# Patient Record
Sex: MALE | Race: WHITE | Employment: OTHER | URBAN - NONMETROPOLITAN AREA
[De-identification: names, ages, dates, MRNs, and addresses within clinical notes are randomized per-mention and may not be internally consistent; named-entity substitution may affect disease eponyms.]

---

## 2017-02-08 ENCOUNTER — HOSPITAL ENCOUNTER (OUTPATIENT)
Dept: NUCLEAR MEDICINE | Age: 67
Discharge: HOME | End: 2017-02-08
Payer: OTHER GOVERNMENT

## 2017-02-08 DIAGNOSIS — R06.02 SHORTNESS OF BREATH: ICD-10-CM

## 2017-02-08 PROCEDURE — 78582 LUNG VENTILAT&PERFUS IMAGING: CPT

## 2017-02-08 PROCEDURE — 3430000000 HC RX DIAGNOSTIC RADIOPHARMACEUTICAL: Performed by: INTERNAL MEDICINE

## 2017-02-08 PROCEDURE — A9558 XE133 XENON 10MCI: HCPCS | Performed by: INTERNAL MEDICINE

## 2017-02-08 PROCEDURE — A9540 TC99M MAA: HCPCS | Performed by: INTERNAL MEDICINE

## 2017-02-08 RX ADMIN — Medication 10 MILLICURIE: at 11:52

## 2017-02-08 RX ADMIN — Medication 5 MILLICURIE: at 11:52

## 2017-10-30 ENCOUNTER — HOSPITAL ENCOUNTER (OUTPATIENT)
Dept: WOUND CARE | Age: 67
Discharge: HOME | End: 2017-10-30
Payer: MEDICARE

## 2017-10-30 VITALS
HEIGHT: 71 IN | BODY MASS INDEX: 25.06 KG/M2 | HEART RATE: 77 BPM | TEMPERATURE: 97.5 F | WEIGHT: 179 LBS | RESPIRATION RATE: 18 BRPM | SYSTOLIC BLOOD PRESSURE: 104 MMHG | DIASTOLIC BLOOD PRESSURE: 58 MMHG

## 2017-10-30 DIAGNOSIS — E11.621 TYPE 2 DIABETES MELLITUS WITH FOOT ULCER, WITH LONG-TERM CURRENT USE OF INSULIN (HCC): ICD-10-CM

## 2017-10-30 DIAGNOSIS — L97.313 NON-PRESSURE CHRONIC ULCER OF RIGHT ANKLE WITH NECROSIS OF MUSCLE (HCC): Chronic | ICD-10-CM

## 2017-10-30 DIAGNOSIS — Z79.4 TYPE 2 DIABETES MELLITUS WITH FOOT ULCER, WITH LONG-TERM CURRENT USE OF INSULIN (HCC): ICD-10-CM

## 2017-10-30 DIAGNOSIS — L97.509 TYPE 2 DIABETES MELLITUS WITH FOOT ULCER, WITH LONG-TERM CURRENT USE OF INSULIN (HCC): ICD-10-CM

## 2017-10-30 DIAGNOSIS — I25.10 CORONARY ARTERY DISEASE INVOLVING NATIVE HEART, ANGINA PRESENCE UNSPECIFIED, UNSPECIFIED VESSEL OR LESION TYPE: ICD-10-CM

## 2017-10-30 PROCEDURE — 99213 OFFICE O/P EST LOW 20 MIN: CPT

## 2017-10-30 PROCEDURE — 99203 OFFICE O/P NEW LOW 30 MIN: CPT | Performed by: SURGERY

## 2017-10-30 PROCEDURE — 11042 DBRDMT SUBQ TIS 1ST 20SQCM/<: CPT

## 2017-10-30 PROCEDURE — 11042 DBRDMT SUBQ TIS 1ST 20SQCM/<: CPT | Performed by: SURGERY

## 2017-10-30 RX ORDER — BUMETANIDE 1 MG/1
1 TABLET ORAL 3 TIMES DAILY
Status: ON HOLD | COMMUNITY
End: 2017-12-18 | Stop reason: HOSPADM

## 2017-10-30 RX ORDER — TRAMADOL HYDROCHLORIDE 50 MG/1
50 TABLET ORAL EVERY 6 HOURS PRN
Status: ON HOLD | COMMUNITY
End: 2017-12-18 | Stop reason: HOSPADM

## 2017-10-30 RX ORDER — METOPROLOL SUCCINATE 25 MG/1
25 TABLET, EXTENDED RELEASE ORAL DAILY
Status: ON HOLD | COMMUNITY
End: 2019-04-12 | Stop reason: CLARIF

## 2017-10-30 RX ORDER — SPIRONOLACTONE 50 MG/1
50 TABLET, FILM COATED ORAL DAILY
Status: ON HOLD | COMMUNITY
End: 2017-12-18 | Stop reason: HOSPADM

## 2017-10-30 RX ORDER — WARFARIN SODIUM 6 MG/1
6 TABLET ORAL
Status: ON HOLD | COMMUNITY
End: 2017-12-18 | Stop reason: HOSPADM

## 2017-10-30 RX ORDER — LATANOPROST 50 UG/ML
1 SOLUTION/ DROPS OPHTHALMIC NIGHTLY
COMMUNITY

## 2017-10-30 RX ORDER — WARFARIN SODIUM 1 MG/1
1 TABLET ORAL EVERY OTHER DAY
Status: ON HOLD | COMMUNITY
End: 2017-12-18 | Stop reason: HOSPADM

## 2017-10-30 RX ORDER — ALBUTEROL SULFATE 90 UG/1
2 AEROSOL, METERED RESPIRATORY (INHALATION) EVERY 6 HOURS PRN
Status: ON HOLD | COMMUNITY
End: 2017-12-18 | Stop reason: HOSPADM

## 2017-10-30 RX ORDER — SENNA AND DOCUSATE SODIUM 50; 8.6 MG/1; MG/1
2 TABLET, FILM COATED ORAL 2 TIMES DAILY
Status: ON HOLD | COMMUNITY
End: 2017-12-18 | Stop reason: HOSPADM

## 2017-10-30 RX ORDER — MULTIVIT-MIN/IRON/FOLIC ACID/K 18-600-40
CAPSULE ORAL DAILY
COMMUNITY

## 2017-10-30 RX ORDER — METOLAZONE 2.5 MG/1
2.5 TABLET ORAL DAILY
Status: ON HOLD | COMMUNITY
End: 2017-12-18 | Stop reason: HOSPADM

## 2017-10-30 RX ORDER — LANOLIN ALCOHOL/MO/W.PET/CERES
3 CREAM (GRAM) TOPICAL NIGHTLY
Status: ON HOLD | COMMUNITY
End: 2017-12-18 | Stop reason: HOSPADM

## 2017-10-30 RX ORDER — GUAIFENESIN 600 MG/1
1200 TABLET, EXTENDED RELEASE ORAL 2 TIMES DAILY PRN
Status: ON HOLD | COMMUNITY
End: 2019-04-12 | Stop reason: SINTOL

## 2017-10-30 RX ORDER — ACETAMINOPHEN 500 MG
500 TABLET ORAL EVERY 6 HOURS PRN
Status: ON HOLD | COMMUNITY
End: 2017-12-18 | Stop reason: HOSPADM

## 2017-10-30 RX ORDER — LORATADINE 10 MG/1
10 CAPSULE, LIQUID FILLED ORAL DAILY
Status: ON HOLD | COMMUNITY
End: 2017-12-18 | Stop reason: HOSPADM

## 2017-10-30 RX ORDER — FLUTICASONE PROPIONATE 50 MCG
2 SPRAY, SUSPENSION (ML) NASAL 2 TIMES DAILY
Status: ON HOLD | COMMUNITY
End: 2019-04-12 | Stop reason: ALTCHOICE

## 2017-10-30 RX ORDER — HYDRALAZINE HYDROCHLORIDE 10 MG/1
10 TABLET, FILM COATED ORAL 3 TIMES DAILY
Status: ON HOLD | COMMUNITY
End: 2017-12-18 | Stop reason: HOSPADM

## 2017-10-30 RX ORDER — LACTULOSE 20 G/30ML
SOLUTION ORAL 2 TIMES DAILY
Status: ON HOLD | COMMUNITY
End: 2019-04-12

## 2017-10-30 RX ORDER — FLUOXETINE HYDROCHLORIDE 20 MG/1
20 CAPSULE ORAL NIGHTLY
COMMUNITY

## 2017-10-30 RX ORDER — LEVOTHYROXINE SODIUM 0.07 MG/1
50 TABLET ORAL DAILY
Status: ON HOLD | COMMUNITY
End: 2019-04-12

## 2017-10-30 RX ORDER — TAMSULOSIN HYDROCHLORIDE 0.4 MG/1
0.4 CAPSULE ORAL DAILY
COMMUNITY

## 2017-10-30 RX ORDER — INSULIN GLARGINE 100 [IU]/ML
15 INJECTION, SOLUTION SUBCUTANEOUS 2 TIMES DAILY
COMMUNITY

## 2017-10-30 RX ORDER — GABAPENTIN 300 MG/1
300 CAPSULE ORAL 2 TIMES DAILY
Status: ON HOLD | COMMUNITY
End: 2017-12-18 | Stop reason: HOSPADM

## 2017-10-30 RX ORDER — ATORVASTATIN CALCIUM 40 MG/1
40 TABLET, FILM COATED ORAL NIGHTLY
COMMUNITY

## 2017-10-30 RX ORDER — ALLOPURINOL 100 MG/1
100 TABLET ORAL DAILY
COMMUNITY

## 2017-10-30 RX ORDER — CLOPIDOGREL BISULFATE 75 MG/1
75 TABLET ORAL DAILY
Status: ON HOLD | COMMUNITY
End: 2017-12-18 | Stop reason: HOSPADM

## 2017-10-30 RX ORDER — DIPHENHYDRAMINE HCL 25 MG
25 CAPSULE ORAL NIGHTLY
Status: ON HOLD | COMMUNITY
End: 2017-12-18 | Stop reason: HOSPADM

## 2017-10-30 RX ORDER — TRAZODONE HYDROCHLORIDE 100 MG/1
100 TABLET ORAL NIGHTLY
COMMUNITY

## 2017-10-30 ASSESSMENT — PAIN DESCRIPTION - LOCATION: LOCATION: ANKLE

## 2017-10-30 ASSESSMENT — PAIN DESCRIPTION - PROGRESSION: CLINICAL_PROGRESSION: GRADUALLY WORSENING

## 2017-10-30 ASSESSMENT — PAIN DESCRIPTION - ORIENTATION: ORIENTATION: RIGHT

## 2017-10-30 ASSESSMENT — PAIN DESCRIPTION - ONSET: ONSET: GRADUAL

## 2017-10-30 ASSESSMENT — PAIN DESCRIPTION - DESCRIPTORS: DESCRIPTORS: BURNING

## 2017-10-30 ASSESSMENT — PAIN SCALES - GENERAL: PAINLEVEL_OUTOF10: 3

## 2017-10-30 ASSESSMENT — PAIN DESCRIPTION - PAIN TYPE: TYPE: ACUTE PAIN

## 2017-10-30 ASSESSMENT — PAIN DESCRIPTION - FREQUENCY: FREQUENCY: CONTINUOUS

## 2017-10-30 NOTE — PROGRESS NOTES
nightly      acetaminophen (TYLENOL) 500 MG tablet Take 500 mg by mouth every 6 hours as needed for Pain      Cholecalciferol (VITAMIN D) 2000 units CAPS capsule Take by mouth daily      mupirocin (BACTROBAN) 2 % ointment Apply topically 2 times daily for 20 days 1 Tube 2     Current Facility-Administered Medications   Medication Dose Route Frequency Provider Last Rate Last Dose    lidocaine (XYLOCAINE) 2 % jelly 1 Dose  1 Dose Topical Once Shahab Acosta MD         Allergies: Prazosin  Past Medical History:   Diagnosis Date    Acute kidney failure (HCC)     Benign prostatic hyperplasia without urinary obstruction     CAD (coronary artery disease)     A fib    [facility]     Diabetes mellitus ([facility]     Emphysema lung ([facility] [facility]     Glaucoma     Gout     Hypertension     Muscle weakness     Occlusion and stenosis of left carotid artery     Psychiatric problem     depression       Past Surgical History:   Procedure Laterality Date    AORTIC VALVE REPLACEMENT      [facility]    EF60%    CARDIAC CATHETERIZATION  12/16/14  [facility]    right heart cath EF 60%    CORONARY ANGIOPLASTY WITH STENT PLACEMENT  12/18/14  [facility]    to RCA     History reviewed. No pertinent family history. Social History   Substance Use Topics    Smoking status: Never Smoker    Smokeless tobacco: Never Used    Alcohol use No         Review of Systems    Constitutional - no significant activity change, appetite change, or unexpected weight change. No fever or chills. No diaphoresis or significant fatigue. HENT - no significant rhinorrhea or epistaxis. No tinnitus or significant hearing loss. Eyes - no sudden vision change or amaurosis. Respiratory - no significant shortness of breath, wheezing, or stridor. No apnea, cough, or chest tightness associated with shortness of breath. Cardiovascular - no chest pain, syncope, or significant dizziness.   No Status Old drainage; Intact 10/30/2017  8:54 AM   Wound Cleansed Rinsed/Irrigated with saline 10/30/2017  8:54 AM   Necrotic Type Yellow Fibrin/Slough 10/30/2017  8:54 AM   Necrotic Amount Small: 1-33% 10/30/2017  8:54 AM   Granulation Quality Pink 10/30/2017  8:54 AM   Drainage Amount Large 10/30/2017  8:54 AM   Drainage Description Serosanguinous 10/30/2017  8:54 AM   Odor Mild 10/30/2017  8:54 AM   Epithelialization None present 10/30/2017  8:54 AM   Margins Attached edges 10/30/2017  8:54 AM   Exposed structure Tendon 10/30/2017  8:54 AM   Debridement per physician Subcutaneous 10/30/2017  9:18 AM   Time out Yes 10/30/2017  9:18 AM   Procedural Pain 0 10/30/2017  9:18 AM   Post procedural Pain 0 10/30/2017  9:18 AM   Number of days: 0      The patients pain isPain Level: 3 Pain Type: Acute pain. Please refer to nursing measurements and assessment regarding wound pre and post debridement. Bleeding: Minimal    Hemostasis:   by pressure    Response to treatment:  Well tolerated by patient. Plan for wound - Dress per physician order    Treatment:     Compression : No   Offloading : Yes   Dressing : SEE AVS   Additional Therapy : Pt needs immobilizer boot and promogran as well as ISMAEL and labs   Discussed appropriate home care of this wound. Wound redressed. Patient instructions were given. Follow up: 1 week.   Recommend no smoking  Offloading instructions given    Discharge Instructions       Visit Discharge/Physician Orders    Discharge condition: Stable    Discharge to: Home    Left via:Private automobile    ECF/HHA: [address] [facility]    Dressing Orders: Right Achilles  Promogran to wound, Santyl Ointment, Saline Moist Gauze, Dry Gauze, Roll Gauze, Medipore Tape, Twice Daily  Wear walking Boot to immobilize ankle  Elevate foot to level or above heart 3-4 times daily and as needed to reduce swelling  ISMAEL on 11/[address] in [address] Test will be done in [address] at 8:00  Wound Care

## 2017-11-06 ENCOUNTER — HOSPITAL ENCOUNTER (OUTPATIENT)
Dept: WOUND CARE | Age: 67
Discharge: HOME | End: 2017-11-06
Payer: MEDICARE

## 2017-11-06 ENCOUNTER — HOSPITAL ENCOUNTER (OUTPATIENT)
Dept: NON INVASIVE DIAGNOSTICS | Age: 67
Discharge: HOME | End: 2017-11-06
Payer: MEDICARE

## 2017-11-06 DIAGNOSIS — L97.313 NON-PRESSURE CHRONIC ULCER OF RIGHT ANKLE WITH NECROSIS OF MUSCLE (HCC): Chronic | ICD-10-CM

## 2017-11-06 DIAGNOSIS — L97.313 NON-PRESSURE CHRONIC ULCER OF RIGHT ANKLE WITH NECROSIS OF MUSCLE (HCC): ICD-10-CM

## 2017-11-06 DIAGNOSIS — L97.509 TYPE 2 DIABETES MELLITUS WITH FOOT ULCER, WITH LONG-TERM CURRENT USE OF INSULIN (HCC): ICD-10-CM

## 2017-11-06 DIAGNOSIS — I25.10 CORONARY ARTERY DISEASE INVOLVING NATIVE HEART, ANGINA PRESENCE UNSPECIFIED, UNSPECIFIED VESSEL OR LESION TYPE: ICD-10-CM

## 2017-11-06 DIAGNOSIS — Z79.4 TYPE 2 DIABETES MELLITUS WITH FOOT ULCER, WITH LONG-TERM CURRENT USE OF INSULIN (HCC): ICD-10-CM

## 2017-11-06 DIAGNOSIS — E11.621 TYPE 2 DIABETES MELLITUS WITH FOOT ULCER, WITH LONG-TERM CURRENT USE OF INSULIN (HCC): ICD-10-CM

## 2017-11-06 PROCEDURE — 97597 DBRDMT OPN WND 1ST 20 CM/<: CPT

## 2017-11-06 PROCEDURE — 97597 DBRDMT OPN WND 1ST 20 CM/<: CPT | Performed by: SURGERY

## 2017-11-06 PROCEDURE — 93923 UPR/LXTR ART STDY 3+ LVLS: CPT

## 2017-11-06 NOTE — PROGRESS NOTES
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 Performed by: Maryanna Ahumada, MD    Consent obtained:  Yes    Time out taken:  Yes    Pain Control: Anesthetic  Anesthetic: 2% Xylocaine Gel       Debridement:Non-excisional Debridement    Using curette the wound(s)/ulcer(s) was/were sharply debrided down through and including the removal of viable and non-viable epidermis and dermis. Devitalized Tissue Debrided:  fibrin, biofilm, slough and exudate    Pre Debridement Measurements:  Are located in the [place]  Documentation Flow Sheet    Wound/Ulcer #: 1    Percent of Wound/Ulcer Debrided: 50%    Total Surface Area Debrided:  4.5 sq cm       Diabetic Ulcer 10/30/17 Ankle Right WOUND 1 RIGHT ACHILLES DIABETIC  (Active)   Janel-wound Assessment Pink;Red 11/6/2017  9:35 AM   Janel-Wound Texture Scarring 11/6/2017  9:35 AM   Janel-Wound Moisture Dry; Intact 11/6/2017  9:35 AM   Janel-Wound Color Pink 11/6/2017  9:35 AM   Diabetic Wound - Claudene Skeeters 1 11/6/2017  9:35 AM   Non-staged Wound Description Not applicable 95/3/1998  2:35 AM   Wound Assessment Granulation tissue;Slough; Yellow 11/6/2017  9:35 AM   Shape roundish 10/30/2017  8:54 AM   Wound Length (cm) 2.6 cm 11/6/2017  9:48 AM   Wound Width (cm) 3.8 cm 11/6/2017  9:48 AM   Wound Depth (cm)  .3 11/6/2017  9:48 AM   Calculated Wound Size (cm^2) (l*w) 9.88 cm^2 11/6/2017  9:48 AM   Change in Wound Size % (l*w) -47.02 11/6/2017  9:48 AM   Dressing Status Old drainage; Intact 11/6/2017  9:35 AM   Dressing Changed Changed/New 10/30/2017 10:28 AM   Wound Cleansed Rinsed/Irrigated with saline 11/6/2017  9:35 AM   Necrotic Type Yellow Fibrin/Slough 11/6/2017  9:35 AM   Necrotic Amount Small: 1-33% 11/6/2017  9:35 AM   Granulation Quality Pink 11/6/2017  9:35 AM   Drainage Amount Large 11/6/2017  9:35 AM   Drainage Description Serosanguinous 11/6/2017  9:35 AM   Odor Mild 11/6/2017  9:35 AM   Epithelialization None present 11/6/2017  9:35 AM   Distance Tunneling (cm) 0 cm 11/6/2017  9:35 AM   Tunneling Position ___ O'Clock 0 11/6/2017  9:35 AM   Tunneling Maxium Distance (cm) 0 11/6/2017  9:35 AM   Undermining Starts ___ O'Clock 0 11/6/2017  9:35 AM   Undermining Ends___ O'Clock 0 11/6/2017  9:35 AM   Undermining Maxium Distance (cm) 0 11/6/2017  9:35 AM   Schlusser%Wound Bed 10 11/6/2017  9:35 AM   Red%Wound Bed 0 11/6/2017  9:35 AM   Yellow%Wound Bed 90 11/6/2017  9:35 AM   Black%Wound Bed 0 11/6/2017  9:35 AM   Purple%Wound Bed 0 11/6/2017  9:35 AM   Margins Attached edges 11/6/2017  9:35 AM   Exposed structure Tendon 11/6/2017  9:35 AM   Debridement per physician Full thickness 11/6/2017  9:48 AM   Time out Yes 11/6/2017  9:48 AM   Procedural Pain 0 11/6/2017  9:48 AM   Post procedural Pain 0 11/6/2017  9:48 AM   Number of days: 7       Diabetic/Pressure/Non Pressure Ulcers only:  Ulcer: Diabetic ulcer, fat layer exposed      Estimated Blood Loss:  Minimal    Hemostasis Achieved:  by pressure    Procedural Pain:  0  / 10     Post Procedural Pain:  0 / 10     Response to treatment:  Well tolerated by

## 2017-11-13 ENCOUNTER — HOSPITAL ENCOUNTER (OUTPATIENT)
Dept: WOUND CARE | Age: 67
Discharge: HOME | End: 2017-11-13
Payer: MEDICARE

## 2017-11-13 VITALS
DIASTOLIC BLOOD PRESSURE: 57 MMHG | TEMPERATURE: 97.4 F | HEART RATE: 81 BPM | HEIGHT: 71 IN | BODY MASS INDEX: 25.06 KG/M2 | RESPIRATION RATE: 14 BRPM | SYSTOLIC BLOOD PRESSURE: 100 MMHG | WEIGHT: 179 LBS

## 2017-11-13 DIAGNOSIS — L97.313 NON-PRESSURE CHRONIC ULCER OF RIGHT ANKLE WITH NECROSIS OF MUSCLE (HCC): ICD-10-CM

## 2017-11-13 DIAGNOSIS — L97.313 NON-PRESSURE CHRONIC ULCER OF RIGHT ANKLE WITH NECROSIS OF MUSCLE (HCC): Chronic | ICD-10-CM

## 2017-11-13 DIAGNOSIS — L97.509 TYPE 2 DIABETES MELLITUS WITH FOOT ULCER, WITH LONG-TERM CURRENT USE OF INSULIN (HCC): ICD-10-CM

## 2017-11-13 DIAGNOSIS — Z79.4 TYPE 2 DIABETES MELLITUS WITH FOOT ULCER, WITH LONG-TERM CURRENT USE OF INSULIN (HCC): ICD-10-CM

## 2017-11-13 DIAGNOSIS — E11.621 TYPE 2 DIABETES MELLITUS WITH FOOT ULCER, WITH LONG-TERM CURRENT USE OF INSULIN (HCC): ICD-10-CM

## 2017-11-13 DIAGNOSIS — I25.10 CORONARY ARTERY DISEASE INVOLVING NATIVE HEART, ANGINA PRESENCE UNSPECIFIED, UNSPECIFIED VESSEL OR LESION TYPE: ICD-10-CM

## 2017-11-13 LAB
ALBUMIN SERPL-MCNC: 3.6 G/DL (ref 3.5–5.2)
ALP BLD-CCNC: 114 U/L (ref 40–130)
ALT SERPL-CCNC: 12 U/L (ref 5–41)
ANION GAP SERPL CALCULATED.3IONS-SCNC: 14 MMOL/L (ref 7–19)
AST SERPL-CCNC: 20 U/L (ref 5–40)
BASOPHILS ABSOLUTE: 0.1 K/UL (ref 0–0.2)
BASOPHILS RELATIVE PERCENT: 0.7 % (ref 0–1)
BILIRUB SERPL-MCNC: 0.3 MG/DL (ref 0.2–1.2)
BUN BLDV-MCNC: 44 MG/DL (ref 8–23)
C-REACTIVE PROTEIN: 2.96 MG/DL (ref 0–0.5)
CALCIUM SERPL-MCNC: 9.6 MG/DL (ref 8.8–10.2)
CHLORIDE BLD-SCNC: 94 MMOL/L (ref 98–111)
CO2: 31 MMOL/L (ref 22–29)
CREAT SERPL-MCNC: 1.2 MG/DL (ref 0.5–1.2)
EOSINOPHILS ABSOLUTE: 0.5 K/UL (ref 0–0.6)
EOSINOPHILS RELATIVE PERCENT: 6.4 % (ref 0–5)
GFR NON-AFRICAN AMERICAN: >60
GLUCOSE BLD-MCNC: 184 MG/DL (ref 74–109)
HBA1C MFR BLD: 6.4 %
HCT VFR BLD CALC: 35.8 % (ref 42–52)
HEMOGLOBIN: 10.7 G/DL (ref 14–18)
LYMPHOCYTES ABSOLUTE: 1.1 K/UL (ref 1.1–4.5)
LYMPHOCYTES RELATIVE PERCENT: 14.6 % (ref 20–40)
MCH RBC QN AUTO: 25.1 PG (ref 27–31)
MCHC RBC AUTO-ENTMCNC: 29.9 G/DL (ref 33–37)
MCV RBC AUTO: 84 FL (ref 80–94)
MONOCYTES ABSOLUTE: 0.7 K/UL (ref 0–0.9)
MONOCYTES RELATIVE PERCENT: 8.9 % (ref 0–10)
NEUTROPHILS ABSOLUTE: 5.2 K/UL (ref 1.5–7.5)
NEUTROPHILS RELATIVE PERCENT: 69 % (ref 50–65)
PDW BLD-RTO: 16.4 % (ref 11.5–14.5)
PLATELET # BLD: 218 K/UL (ref 130–400)
PMV BLD AUTO: 9.8 FL (ref 9.4–12.4)
POTASSIUM SERPL-SCNC: 4.6 MMOL/L (ref 3.5–5)
PREALBUMIN: 24 MG/DL (ref 20–40)
RBC # BLD: 4.26 M/UL (ref 4.7–6.1)
SEDIMENTATION RATE, ERYTHROCYTE: 46 MM/HR (ref 0–15)
SODIUM BLD-SCNC: 139 MMOL/L (ref 136–145)
TOTAL PROTEIN: 7.5 G/DL (ref 6.6–8.7)
WBC # BLD: 7.5 K/UL (ref 4.8–10.8)

## 2017-11-13 PROCEDURE — 99213 OFFICE O/P EST LOW 20 MIN: CPT

## 2017-11-13 PROCEDURE — 99213 OFFICE O/P EST LOW 20 MIN: CPT | Performed by: SURGERY

## 2017-11-13 NOTE — PROGRESS NOTES
mouth daily      atorvastatin (LIPITOR) 40 MG tablet Take 40 mg by mouth nightly      bumetanide (BUMEX) 1 MG tablet Take 1 mg by mouth 3 times daily      clopidogrel (PLAVIX) 75 MG tablet Take 75 mg by mouth daily      warfarin (COUMADIN) 1 MG tablet Take 1 mg by mouth every other day      warfarin (COUMADIN) 6 MG tablet Take 6 mg by mouth      diphenhydrAMINE (BENADRYL) 25 MG capsule Take 25 mg by mouth nightly      fluticasone (FLONASE) 50 MCG/ACT nasal spray 2 sprays by Nasal route 2 times daily      FLUoxetine (PROZAC) 20 MG capsule Take 20 mg by mouth nightly      gabapentin (NEURONTIN) 300 MG capsule Take 300 mg by mouth 2 times daily      guaiFENesin (MUCINEX) 600 MG extended release tablet Take 1,200 mg by mouth 2 times daily      hydrALAZINE (APRESOLINE) 10 MG tablet Take 10 mg by mouth 3 times daily      lactulose 20 GM/30ML SOLN Take by mouth 2 times daily      latanoprost (XALATAN) 0.005 % ophthalmic solution Place 1 drop into both eyes nightly      levothyroxine (SYNTHROID) 75 MCG tablet Take 75 mcg by mouth Daily      loratadine (CLARITIN) 10 MG capsule Take 10 mg by mouth daily      melatonin 3 MG TABS tablet Take 3 mg by mouth nightly      metolazone (ZAROXOLYN) 2.5 MG tablet Take 2.5 mg by mouth daily      metoprolol succinate (TOPROL XL) 25 MG extended release tablet Take 25 mg by mouth daily      olodaterol (STRIVERDI RESPIMAT) 2.5 MCG/ACT inhaler Inhale 2 puffs into the lungs daily      sennosides-docusate sodium (SENOKOT-S) 8.6-50 MG tablet Take 2 tablets by mouth 2 times daily      tiotropium (SPIRIVA) 18 MCG inhalation capsule Inhale 18 mcg into the lungs daily      tamsulosin (FLOMAX) 0.4 MG capsule Take 0.4 mg by mouth daily      traMADol (ULTRAM) 50 MG tablet Take 50 mg by mouth every 6 hours as needed for Pain      traZODone (DESYREL) 100 MG tablet Take 100 mg by mouth nightly      acetaminophen (TYLENOL) 500 MG tablet Take 500 mg by mouth every 6 hours as needed for Pain      Cholecalciferol (VITAMIN D) 2000 units CAPS capsule Take by mouth daily      mupirocin (BACTROBAN) 2 % ointment Apply topically 2 times daily for 20 days 1 Tube 2    insulin glargine (LANTUS) 100 UNIT/ML injection vial Inject 42 Units into the skin nightly       No current facility-administered medications on file prior to encounter. ALLERGIES    Prazosin        Objective:         Vitals:    11/13/17 0917   BP: (!) 100/57   Pulse: 81   Resp: 14   Temp: 97.4 °F (36.3 °C)        BP (!) 100/57   Pulse 81   Temp 97.4 °F (36.3 °C) (Temporal)   Resp 14   Ht 5' 11\" (1.803 m)   Wt 179 lb (81.2 kg)   BMI 24.97 kg/m²     ROS:  Constitutional - Alert and oriented x 3, no complaints, pleasant co-operative  Integumentary - edema improved, no erythema, no cellulitis, no new wounds. However he is not improving at this time. The patients pain isPain Level: 6 Pain Type: Acute pain. Wound Measurements and Assessment:  Diabetic Ulcer 10/30/17 Ankle Right WOUND 1 RIGHT ACHILLES DIABETIC  (Active)   Janel-wound Assessment Pink;Red 11/13/2017  9:17 AM   Janel-Wound Texture Scarring 11/13/2017  9:17 AM   Janel-Wound Moisture Intact;Dry 11/13/2017  9:17 AM   Janel-Wound Color Pink 11/13/2017  9:17 AM   Diabetic Wound - Kate Foss 1 11/13/2017  9:17 AM   Non-staged Wound Description Not applicable 23/50/1069  7:42 AM   Wound Assessment Granulation tissue;Slough; Yellow 11/13/2017  9:17 AM   Shape roundish 10/30/2017  8:54 AM   Wound Length (cm) 3.1 cm 11/13/2017  9:17 AM   Wound Width (cm) 4.4 cm 11/13/2017  9:17 AM   Wound Depth (cm)  0.4 11/13/2017  9:17 AM   Calculated Wound Size (cm^2) (l*w) 13.64 cm^2 11/13/2017  9:17 AM   Change in Wound Size % (l*w) -102.98 11/13/2017  9:17 AM   Dressing Status Old drainage 11/13/2017  9:17 AM   Dressing Changed Changed/New 11/6/2017 10:35 AM   Wound Cleansed Rinsed/Irrigated with saline 11/13/2017  9:17 AM   Necrotic Type Yellow Fibrin/Slough 11/13/2017  9:17 AM condition: Stable    Discharge to: Home    Left via:Private automobile    Accompanied by:  Spouse    [facility]: [facility] [phone]     Dressing Orders: Right Achilles  Promogran to wound, Santyl Ointment, Saline Moist Gauze, Dry Gauze, Roll Gauze, Medipore Tape, Twice Daily  Wear walking Boot to immobilize ankle  Elevate foot to level or above heart 3-4 times daily and as needed to reduce swelling      [facility] followup visit ____________1 week_________________  (Please note your next appointment above and if you are unable to keep, kindly give a 24 hour notice. Thank you.)          If you experience any of the following, please call the [facility] during business hours:    * Increase in Pain  * Temperature over 101  * Increase in drainage from your wound  * Drainage with a foul odor  * Bleeding  * Increase in swelling  * Need for compression bandage changes due to slippage, breakthrough drainage. If you need medical attention outside of the business hours of the [facility] please contact your PCP or go to the nearest emergency room.         Electronically signed by Anette Sigala MD on 11/13/2017 at 9:45 AM

## 2017-11-22 ENCOUNTER — HOSPITAL ENCOUNTER (OUTPATIENT)
Dept: WOUND CARE | Age: 67
Discharge: HOME | End: 2017-11-22
Payer: MEDICARE

## 2017-11-22 VITALS
BODY MASS INDEX: 25.76 KG/M2 | TEMPERATURE: 97.5 F | DIASTOLIC BLOOD PRESSURE: 60 MMHG | SYSTOLIC BLOOD PRESSURE: 119 MMHG | WEIGHT: 184 LBS | HEART RATE: 78 BPM | RESPIRATION RATE: 16 BRPM | HEIGHT: 71 IN

## 2017-11-22 DIAGNOSIS — L97.313 NON-PRESSURE CHRONIC ULCER OF RIGHT ANKLE WITH NECROSIS OF MUSCLE (HCC): ICD-10-CM

## 2017-11-22 PROCEDURE — 99213 OFFICE O/P EST LOW 20 MIN: CPT | Performed by: SURGERY

## 2017-11-22 PROCEDURE — 99213 OFFICE O/P EST LOW 20 MIN: CPT

## 2017-11-27 ENCOUNTER — HOSPITAL ENCOUNTER (INPATIENT)
Age: 67
LOS: 9 days | Discharge: NURSING, REHAB OR LONG-TERM CARE | DRG: 240 | End: 2017-12-06
Attending: SURGERY | Admitting: SURGERY
Payer: COMMERCIAL

## 2017-11-27 ENCOUNTER — APPOINTMENT (OUTPATIENT)
Dept: GENERAL RADIOLOGY | Age: 67
DRG: 240 | End: 2017-11-27
Attending: SURGERY
Payer: COMMERCIAL

## 2017-11-27 ENCOUNTER — TELEPHONE (OUTPATIENT)
Dept: WOUND CARE | Age: 67
End: 2017-11-27

## 2017-11-27 DIAGNOSIS — L97.313 NON-PRESSURE CHRONIC ULCER OF RIGHT ANKLE WITH NECROSIS OF MUSCLE (HCC): ICD-10-CM

## 2017-11-27 PROBLEM — E11.621 TYPE 2 DIABETES MELLITUS WITH FOOT ULCER, WITH LONG-TERM CURRENT USE OF INSULIN (HCC): Chronic | Status: ACTIVE | Noted: 2017-11-27

## 2017-11-27 PROBLEM — E11.621 TYPE 2 DIABETES MELLITUS WITH FOOT ULCER, WITH LONG-TERM CURRENT USE OF INSULIN (HCC): Status: ACTIVE | Noted: 2017-11-27

## 2017-11-27 PROBLEM — Z79.4 TYPE 2 DIABETES MELLITUS WITH FOOT ULCER, WITH LONG-TERM CURRENT USE OF INSULIN (HCC): Status: ACTIVE | Noted: 2017-11-27

## 2017-11-27 PROBLEM — L97.509 TYPE 2 DIABETES MELLITUS WITH FOOT ULCER, WITH LONG-TERM CURRENT USE OF INSULIN (HCC): Status: ACTIVE | Noted: 2017-11-27

## 2017-11-27 PROBLEM — B99.9 INFECTION: Status: ACTIVE | Noted: 2017-11-27

## 2017-11-27 PROBLEM — Z79.4 TYPE 2 DIABETES MELLITUS WITH FOOT ULCER, WITH LONG-TERM CURRENT USE OF INSULIN (HCC): Chronic | Status: ACTIVE | Noted: 2017-11-27

## 2017-11-27 PROBLEM — L97.509 TYPE 2 DIABETES MELLITUS WITH FOOT ULCER, WITH LONG-TERM CURRENT USE OF INSULIN (HCC): Chronic | Status: ACTIVE | Noted: 2017-11-27

## 2017-11-27 LAB
ALBUMIN SERPL-MCNC: 3.1 G/DL (ref 3.5–5.2)
ALP BLD-CCNC: 164 U/L (ref 40–130)
ALT SERPL-CCNC: 29 U/L (ref 5–41)
ANION GAP SERPL CALCULATED.3IONS-SCNC: 7 MMOL/L (ref 7–19)
APTT: 62.4 SEC (ref 26–36.2)
AST SERPL-CCNC: 31 U/L (ref 5–40)
BILIRUB SERPL-MCNC: <0.2 MG/DL (ref 0.2–1.2)
BUN BLDV-MCNC: 27 MG/DL (ref 8–23)
CALCIUM SERPL-MCNC: 8.6 MG/DL (ref 8.8–10.2)
CHLORIDE BLD-SCNC: 93 MMOL/L (ref 98–111)
CO2: 39 MMOL/L (ref 22–29)
CREAT SERPL-MCNC: 1.1 MG/DL (ref 0.5–1.2)
GFR NON-AFRICAN AMERICAN: >60
GLUCOSE BLD-MCNC: 213 MG/DL (ref 74–109)
GLUCOSE BLD-MCNC: 236 MG/DL (ref 70–99)
GLUCOSE BLD-MCNC: 318 MG/DL (ref 70–99)
HCT VFR BLD CALC: 30.1 % (ref 42–52)
HEMOGLOBIN: 8.9 G/DL (ref 14–18)
INR BLD: 2.44 (ref 0.88–1.18)
MCH RBC QN AUTO: 24.5 PG (ref 27–31)
MCHC RBC AUTO-ENTMCNC: 29.6 G/DL (ref 33–37)
MCV RBC AUTO: 82.7 FL (ref 80–94)
PDW BLD-RTO: 15.8 % (ref 11.5–14.5)
PERFORMED ON: ABNORMAL
PERFORMED ON: ABNORMAL
PLATELET # BLD: 182 K/UL (ref 130–400)
PMV BLD AUTO: 9.8 FL (ref 9.4–12.4)
POTASSIUM SERPL-SCNC: 3.8 MMOL/L (ref 3.5–5)
PROTHROMBIN TIME: 26.7 SEC (ref 12–14.6)
RBC # BLD: 3.64 M/UL (ref 4.7–6.1)
SODIUM BLD-SCNC: 139 MMOL/L (ref 136–145)
TOTAL PROTEIN: 6.4 G/DL (ref 6.6–8.7)
WBC # BLD: 6 K/UL (ref 4.8–10.8)

## 2017-11-27 PROCEDURE — 85027 COMPLETE CBC AUTOMATED: CPT

## 2017-11-27 PROCEDURE — 80053 COMPREHEN METABOLIC PANEL: CPT

## 2017-11-27 PROCEDURE — 2700000000 HC OXYGEN THERAPY PER DAY

## 2017-11-27 PROCEDURE — 82948 REAGENT STRIP/BLOOD GLUCOSE: CPT

## 2017-11-27 PROCEDURE — 6360000002 HC RX W HCPCS: Performed by: NURSE PRACTITIONER

## 2017-11-27 PROCEDURE — 73590 X-RAY EXAM OF LOWER LEG: CPT

## 2017-11-27 PROCEDURE — 6370000000 HC RX 637 (ALT 250 FOR IP): Performed by: SURGERY

## 2017-11-27 PROCEDURE — 2580000003 HC RX 258: Performed by: SURGERY

## 2017-11-27 PROCEDURE — 6370000000 HC RX 637 (ALT 250 FOR IP): Performed by: NURSE PRACTITIONER

## 2017-11-27 PROCEDURE — 85730 THROMBOPLASTIN TIME PARTIAL: CPT

## 2017-11-27 PROCEDURE — 1210000000 HC MED SURG R&B

## 2017-11-27 PROCEDURE — 94664 DEMO&/EVAL PT USE INHALER: CPT

## 2017-11-27 PROCEDURE — 71020 XR CHEST STANDARD TWO VW: CPT

## 2017-11-27 PROCEDURE — 99222 1ST HOSP IP/OBS MODERATE 55: CPT | Performed by: HOSPITALIST

## 2017-11-27 PROCEDURE — 36415 COLL VENOUS BLD VENIPUNCTURE: CPT

## 2017-11-27 PROCEDURE — 6360000002 HC RX W HCPCS: Performed by: SURGERY

## 2017-11-27 PROCEDURE — 85610 PROTHROMBIN TIME: CPT

## 2017-11-27 RX ORDER — SODIUM CHLORIDE 0.9 % (FLUSH) 0.9 %
10 SYRINGE (ML) INJECTION EVERY 12 HOURS SCHEDULED
Status: DISCONTINUED | OUTPATIENT
Start: 2017-11-27 | End: 2017-12-06 | Stop reason: HOSPADM

## 2017-11-27 RX ORDER — LATANOPROST 50 UG/ML
1 SOLUTION/ DROPS OPHTHALMIC NIGHTLY
Status: DISCONTINUED | OUTPATIENT
Start: 2017-11-27 | End: 2017-12-06 | Stop reason: HOSPADM

## 2017-11-27 RX ORDER — NICOTINE POLACRILEX 4 MG
15 LOZENGE BUCCAL PRN
Status: DISCONTINUED | OUTPATIENT
Start: 2017-11-27 | End: 2017-12-06 | Stop reason: HOSPADM

## 2017-11-27 RX ORDER — BUMETANIDE 1 MG/1
1 TABLET ORAL 3 TIMES DAILY
Status: DISCONTINUED | OUTPATIENT
Start: 2017-11-27 | End: 2017-11-29

## 2017-11-27 RX ORDER — DEXTROSE MONOHYDRATE 25 G/50ML
12.5 INJECTION, SOLUTION INTRAVENOUS PRN
Status: DISCONTINUED | OUTPATIENT
Start: 2017-11-27 | End: 2017-12-06 | Stop reason: HOSPADM

## 2017-11-27 RX ORDER — POTASSIUM CHLORIDE 7.45 MG/ML
10 INJECTION INTRAVENOUS PRN
Status: DISCONTINUED | OUTPATIENT
Start: 2017-11-27 | End: 2017-12-06 | Stop reason: HOSPADM

## 2017-11-27 RX ORDER — INSULIN GLARGINE 100 [IU]/ML
42 INJECTION, SOLUTION SUBCUTANEOUS NIGHTLY
Status: DISCONTINUED | OUTPATIENT
Start: 2017-11-27 | End: 2017-11-29

## 2017-11-27 RX ORDER — CLOPIDOGREL BISULFATE 75 MG/1
75 TABLET ORAL DAILY
Status: DISCONTINUED | OUTPATIENT
Start: 2017-11-28 | End: 2017-11-27

## 2017-11-27 RX ORDER — METOPROLOL SUCCINATE 25 MG/1
25 TABLET, EXTENDED RELEASE ORAL DAILY
Status: DISCONTINUED | OUTPATIENT
Start: 2017-11-28 | End: 2017-12-06 | Stop reason: HOSPADM

## 2017-11-27 RX ORDER — POTASSIUM CHLORIDE 20 MEQ/1
40 TABLET, EXTENDED RELEASE ORAL PRN
Status: DISCONTINUED | OUTPATIENT
Start: 2017-11-27 | End: 2017-12-06 | Stop reason: HOSPADM

## 2017-11-27 RX ORDER — TAMSULOSIN HYDROCHLORIDE 0.4 MG/1
0.4 CAPSULE ORAL DAILY
Status: DISCONTINUED | OUTPATIENT
Start: 2017-11-27 | End: 2017-12-06 | Stop reason: HOSPADM

## 2017-11-27 RX ORDER — LACTULOSE 10 G/15ML
20 SOLUTION ORAL 2 TIMES DAILY
Status: DISCONTINUED | OUTPATIENT
Start: 2017-11-27 | End: 2017-12-02

## 2017-11-27 RX ORDER — MORPHINE SULFATE 1 MG/ML
4 INJECTION, SOLUTION EPIDURAL; INTRATHECAL; INTRAVENOUS
Status: DISCONTINUED | OUTPATIENT
Start: 2017-11-27 | End: 2017-11-28 | Stop reason: ALTCHOICE

## 2017-11-27 RX ORDER — DEXTROSE MONOHYDRATE 50 MG/ML
100 INJECTION, SOLUTION INTRAVENOUS PRN
Status: DISCONTINUED | OUTPATIENT
Start: 2017-11-27 | End: 2017-12-06 | Stop reason: HOSPADM

## 2017-11-27 RX ORDER — TRAZODONE HYDROCHLORIDE 100 MG/1
100 TABLET ORAL NIGHTLY
Status: DISCONTINUED | OUTPATIENT
Start: 2017-11-27 | End: 2017-12-06 | Stop reason: HOSPADM

## 2017-11-27 RX ORDER — SODIUM CHLORIDE 0.9 % (FLUSH) 0.9 %
10 SYRINGE (ML) INJECTION PRN
Status: DISCONTINUED | OUTPATIENT
Start: 2017-11-27 | End: 2017-12-06 | Stop reason: HOSPADM

## 2017-11-27 RX ORDER — HYDROCODONE BITARTRATE AND ACETAMINOPHEN 5; 325 MG/1; MG/1
1 TABLET ORAL EVERY 4 HOURS PRN
Status: DISCONTINUED | OUTPATIENT
Start: 2017-11-27 | End: 2017-12-01

## 2017-11-27 RX ORDER — ATORVASTATIN CALCIUM 40 MG/1
40 TABLET, FILM COATED ORAL NIGHTLY
Status: DISCONTINUED | OUTPATIENT
Start: 2017-11-27 | End: 2017-12-06 | Stop reason: HOSPADM

## 2017-11-27 RX ORDER — MORPHINE SULFATE 1 MG/ML
2 INJECTION, SOLUTION EPIDURAL; INTRATHECAL; INTRAVENOUS
Status: DISCONTINUED | OUTPATIENT
Start: 2017-11-27 | End: 2017-11-28 | Stop reason: ALTCHOICE

## 2017-11-27 RX ORDER — ALLOPURINOL 100 MG/1
100 TABLET ORAL 2 TIMES DAILY
Status: DISCONTINUED | OUTPATIENT
Start: 2017-11-27 | End: 2017-12-06 | Stop reason: HOSPADM

## 2017-11-27 RX ORDER — ACETAMINOPHEN 325 MG/1
650 TABLET ORAL EVERY 4 HOURS PRN
Status: DISCONTINUED | OUTPATIENT
Start: 2017-11-27 | End: 2017-11-28 | Stop reason: SDUPTHER

## 2017-11-27 RX ORDER — VANCOMYCIN HYDROCHLORIDE 1 G/200ML
1000 INJECTION, SOLUTION INTRAVENOUS EVERY 12 HOURS
Status: DISCONTINUED | OUTPATIENT
Start: 2017-11-27 | End: 2017-11-27 | Stop reason: DRUGHIGH

## 2017-11-27 RX ORDER — LEVOTHYROXINE SODIUM 0.07 MG/1
75 TABLET ORAL DAILY
Status: DISCONTINUED | OUTPATIENT
Start: 2017-11-28 | End: 2017-12-06 | Stop reason: HOSPADM

## 2017-11-27 RX ORDER — ONDANSETRON 2 MG/ML
4 INJECTION INTRAMUSCULAR; INTRAVENOUS EVERY 6 HOURS PRN
Status: DISCONTINUED | OUTPATIENT
Start: 2017-11-27 | End: 2017-11-28 | Stop reason: SDUPTHER

## 2017-11-27 RX ORDER — SODIUM HYPOCHLORITE 1.25 MG/ML
SOLUTION TOPICAL 2 TIMES DAILY
Status: DISCONTINUED | OUTPATIENT
Start: 2017-11-27 | End: 2017-12-01

## 2017-11-27 RX ORDER — GABAPENTIN 300 MG/1
300 CAPSULE ORAL 2 TIMES DAILY
Status: DISCONTINUED | OUTPATIENT
Start: 2017-11-27 | End: 2017-12-06 | Stop reason: HOSPADM

## 2017-11-27 RX ORDER — METOLAZONE 2.5 MG/1
2.5 TABLET ORAL DAILY
Status: DISCONTINUED | OUTPATIENT
Start: 2017-11-27 | End: 2017-11-29

## 2017-11-27 RX ORDER — GUAIFENESIN 600 MG/1
1200 TABLET, EXTENDED RELEASE ORAL 2 TIMES DAILY
Status: DISCONTINUED | OUTPATIENT
Start: 2017-11-27 | End: 2017-12-06 | Stop reason: HOSPADM

## 2017-11-27 RX ORDER — POTASSIUM CHLORIDE 20MEQ/15ML
40 LIQUID (ML) ORAL PRN
Status: DISCONTINUED | OUTPATIENT
Start: 2017-11-27 | End: 2017-12-06 | Stop reason: HOSPADM

## 2017-11-27 RX ORDER — SPIRONOLACTONE 50 MG/1
50 TABLET, FILM COATED ORAL DAILY
Status: DISCONTINUED | OUTPATIENT
Start: 2017-11-27 | End: 2017-11-29

## 2017-11-27 RX ORDER — FLUOXETINE HYDROCHLORIDE 20 MG/1
20 CAPSULE ORAL NIGHTLY
Status: DISCONTINUED | OUTPATIENT
Start: 2017-11-27 | End: 2017-12-06 | Stop reason: HOSPADM

## 2017-11-27 RX ORDER — ASPIRIN 81 MG/1
81 TABLET, CHEWABLE ORAL DAILY
Status: DISCONTINUED | OUTPATIENT
Start: 2017-11-27 | End: 2017-12-06 | Stop reason: HOSPADM

## 2017-11-27 RX ORDER — HYDRALAZINE HYDROCHLORIDE 10 MG/1
10 TABLET, FILM COATED ORAL 3 TIMES DAILY
Status: DISCONTINUED | OUTPATIENT
Start: 2017-11-27 | End: 2017-12-03

## 2017-11-27 RX ADMIN — FLUOXETINE 20 MG: 20 CAPSULE ORAL at 20:54

## 2017-11-27 RX ADMIN — SPIRONOLACTONE 50 MG: 50 TABLET ORAL at 17:58

## 2017-11-27 RX ADMIN — LACTULOSE 20 G: 10 SOLUTION ORAL at 21:14

## 2017-11-27 RX ADMIN — MEROPENEM 1 G: 1 INJECTION, POWDER, FOR SOLUTION INTRAVENOUS at 14:11

## 2017-11-27 RX ADMIN — INSULIN GLARGINE 42 UNITS: 100 INJECTION, SOLUTION SUBCUTANEOUS at 20:50

## 2017-11-27 RX ADMIN — INSULIN LISPRO 3 UNITS: 100 INJECTION, SOLUTION INTRAVENOUS; SUBCUTANEOUS at 20:50

## 2017-11-27 RX ADMIN — DAKIN'S SOLUTION 0.125% (QUARTER STRENGTH): 0.12 SOLUTION at 21:00

## 2017-11-27 RX ADMIN — Medication 4 MG: at 20:53

## 2017-11-27 RX ADMIN — Medication 10 ML: at 20:59

## 2017-11-27 RX ADMIN — TRAZODONE HYDROCHLORIDE 100 MG: 100 TABLET ORAL at 20:54

## 2017-11-27 RX ADMIN — INSULIN LISPRO 12 UNITS: 100 INJECTION, SOLUTION INTRAVENOUS; SUBCUTANEOUS at 17:36

## 2017-11-27 RX ADMIN — DAKIN'S SOLUTION 0.125% (QUARTER STRENGTH): 0.12 SOLUTION at 15:52

## 2017-11-27 RX ADMIN — BUMETANIDE 1 MG: 1 TABLET ORAL at 20:54

## 2017-11-27 RX ADMIN — BUMETANIDE 1 MG: 1 TABLET ORAL at 17:36

## 2017-11-27 RX ADMIN — VANCOMYCIN HYDROCHLORIDE 1250 MG: 1 INJECTION, POWDER, LYOPHILIZED, FOR SOLUTION INTRAVENOUS at 15:09

## 2017-11-27 RX ADMIN — HYDROCODONE BITARTRATE AND ACETAMINOPHEN 1 TABLET: 5; 325 TABLET ORAL at 17:58

## 2017-11-27 RX ADMIN — HYDRALAZINE HYDROCHLORIDE 10 MG: 10 TABLET, FILM COATED ORAL at 17:35

## 2017-11-27 RX ADMIN — GUAIFENESIN 1200 MG: 600 TABLET, EXTENDED RELEASE ORAL at 20:54

## 2017-11-27 RX ADMIN — ASPIRIN 81 MG CHEWABLE TABLET 81 MG: 81 TABLET CHEWABLE at 17:35

## 2017-11-27 RX ADMIN — ATORVASTATIN CALCIUM 40 MG: 40 TABLET, FILM COATED ORAL at 20:54

## 2017-11-27 RX ADMIN — GABAPENTIN 300 MG: 300 CAPSULE ORAL at 20:54

## 2017-11-27 RX ADMIN — TAMSULOSIN HYDROCHLORIDE 0.4 MG: 0.4 CAPSULE ORAL at 17:58

## 2017-11-27 RX ADMIN — HYDROCODONE BITARTRATE AND ACETAMINOPHEN 1 TABLET: 5; 325 TABLET ORAL at 12:35

## 2017-11-27 RX ADMIN — MEROPENEM 1 G: 1 INJECTION, POWDER, FOR SOLUTION INTRAVENOUS at 22:33

## 2017-11-27 RX ADMIN — ENOXAPARIN SODIUM 80 MG: 80 INJECTION SUBCUTANEOUS at 20:53

## 2017-11-27 RX ADMIN — ALLOPURINOL 100 MG: 100 TABLET ORAL at 20:54

## 2017-11-27 RX ADMIN — LATANOPROST 1 DROP: 50 SOLUTION OPHTHALMIC at 20:53

## 2017-11-27 RX ADMIN — METOLAZONE 2.5 MG: 2.5 TABLET ORAL at 17:35

## 2017-11-27 ASSESSMENT — PAIN DESCRIPTION - FREQUENCY: FREQUENCY: CONTINUOUS

## 2017-11-27 ASSESSMENT — PAIN DESCRIPTION - PROGRESSION: CLINICAL_PROGRESSION: GRADUALLY IMPROVING

## 2017-11-27 ASSESSMENT — PAIN DESCRIPTION - LOCATION
LOCATION: FOOT
LOCATION: ANKLE

## 2017-11-27 ASSESSMENT — PAIN DESCRIPTION - ONSET: ONSET: ON-GOING

## 2017-11-27 ASSESSMENT — PAIN SCALES - GENERAL
PAINLEVEL_OUTOF10: 7
PAINLEVEL_OUTOF10: 6
PAINLEVEL_OUTOF10: 8
PAINLEVEL_OUTOF10: 2
PAINLEVEL_OUTOF10: 4
PAINLEVEL_OUTOF10: 4

## 2017-11-27 ASSESSMENT — PAIN DESCRIPTION - ORIENTATION
ORIENTATION: RIGHT
ORIENTATION: RIGHT

## 2017-11-27 ASSESSMENT — PAIN DESCRIPTION - PAIN TYPE: TYPE: ACUTE PAIN;CHRONIC PAIN

## 2017-11-27 ASSESSMENT — PAIN DESCRIPTION - DESCRIPTORS: DESCRIPTORS: CONSTANT;BURNING

## 2017-11-27 NOTE — PROGRESS NOTES
Wound Care  Spoke with Dr. Pool Fernández today re: wound photo of right achilles wound. Reviewed consent form for wound photo with patient, he is agreeable and has signed consent form. Consent form for wound photo placed in patient chart on unit. Wound photo was taken and is located in [address] [place] [address] Rd. Moist to dry SNS gauze dressing placed to right achilles wound, Dakins Solution 1/4 str was not available at this time. Right achilles wound measures 5cm x 3.8cm x <0.1cm, gray/black/brwon, moist, very malodorous. Patient nurse Shea Lanier RN was at bedside for dressing change.

## 2017-11-27 NOTE — PROGRESS NOTES
Pharmacy Note  Vancomycin Consult    Charley Pagan is a 69 y.o. male started on Vancomycin for non-healing wound right leg with cellulitis and necrotic tissue; consult received from Dr. Waldemar Lilly to manage therapy. Also receiving the following antibiotics: Merrem. 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 No results for input(s): CREATININE in the last 72 hours. Recent Labs      11/27/17   1251   WBC  6.0       No intake or output data in the 24 hours ending 11/27/17 1329    Culture Date Source Results   None ordered at this time    Ht Readings from Last 1 Encounters:   11/22/17 5' 11\" (1.803 m)        Wt Readings from Last 1 Encounters:   11/22/17 184 lb (83.5 kg)         There is no height or weight on file to calculate BMI. CrCl cannot be calculated (Unknown ideal weight. ). Assessment/Plan:  Will initiate vancomycin 1250 mg IV every 18 hours. Timing of trough level will be determined based on culture results, renal function, and clinical response. Thank you for the consult. Will continue to follow.     Electronically signed by Shaylee Gandara [facility] on 11/27/2017 at 1:29 PM

## 2017-11-27 NOTE — CONSULTS
reports that he does not drink alcohol or use drugs. Family History:      Problem Relation Age of Onset    Parkinsonism Mother     Heart Disease Father     COPD Father     COPD Sister     Heart Disease Brother        REVIEW OF SYSTEMS:  General: Denies any fever or chills. Denies any unexplained weight loss or gain. Denies any change in activity or endurance. HEENT: Denies any headaches or visual changes. Respiratory: Denies any cough or hoarseness. Cardiac: Denies any chest pain or pressure. Denies any palpitations. Denies any presyncope or syncope. Denies any orthopnea or PND. Denies any lower extremity edema. GI: Denies any abdominal pain. Denies any nausea or vomiting. Denies any change in bowel habits. Denies any recent history of GI tract blood loss. : Denies any hematuria, frequency, hesitancy, or dysuria. Musculoskeletal: Denies any pain or swelling in his joints. Neurological: Denies any paraesthesias. Denies any history of seizure or stroke symptoms. Psychological: Denies any problems with anxiety or depression. Skin: Open wound with exposed achilles tendon - right leg     All other systems are negative except where stated above. PHYSICAL EXAM:  /64   Pulse 85   Temp 98.3 °F (36.8 °C) (Oral)   Resp 16   SpO2 99%   General appearance: Demonstrates a well-developed, well-nourished male who is alert and oriented in no acute distress. NECK: Supple. NO JVD. No carotids bruits auscultated. Chest: Clear to auscultation bilaterally without wheezes or rhonchi. Cardiac: Normal prosthetic heart tones with regular rate and rhythm, S1, S2 normal. No murmurs, gallops, or rubs auscultated. Abdomen: Soft, non-tender; non-distended normal bowel sounds no masses, no organomegaly. Extremities: No clubbing or cyanosis. No peripheral edema. Peripheral pulses weakly palpated. Skin: right lower extremity/ankle dressing dry and intact  Neurologic: Grossly intact.     LABS AND DIAGNOSTICS:    CXR:  1. Cardiomegaly with prominent central vessels and small bilateral pleural effusions. The findings are most likely due to congestive failure. Correlate clinically. 2. Prior median sternotomy and heart valve placement. XR Tibia Fibula, Right:  1.. Soft tissue gas within the mid and distal calf posteriorly. It is difficult to ascertain whether this represents postoperative air related to debridement versus an infection with a gas-forming organism. There is no radiographic evidence of acute osteomyelitis. CBC with Differential:   Lab Results   Component Value Date    WBC 6.0 11/27/2017    RBC 3.64 11/27/2017    HGB 8.9 11/27/2017    HCT 30.1 11/27/2017     11/27/2017    MCV 82.7 11/27/2017     BMP:   Lab Results   Component Value Date     11/27/2017    K 3.8 11/27/2017    CL 93 11/27/2017    CO2 39 11/27/2017    BUN 27 11/27/2017    CREATININE 1.1 11/27/2017    CALCIUM 8.6 11/27/2017    LABGLOM >60 11/27/2017    GLUCOSE 213 11/27/2017     PT/INR:  26.7 / 2.44      ASSESSMENT:    1. Chronic Ulcer of Right Ankle with Necrosis of Achilles Tendon  2. DM, Type 2  3. Essential HTN  4. CKD, Stage 3  5. PMHx Mechanical Mitral Valve Replacement, Anticoagulated on Coumadin  6. Acquired Hypothyroidism  7. Mixed Hyperlipidemia  8. Panlobular Emphysema  9. Glaucoma    PLAN:    1. Wound Care per Vascular Surgery  2. Check HgbA1C, Accu-checks with SSI  3. Resume Medications as Indicated  4. Monitor Labs and Cultures  5. Patient has Mechanical Valve - Therapeutic on Coumadin. Will need to be bridged with Lovenox. ALEXANDRA Marshall  ------------------------------------------------------------------------  I have independently interviewed and examined Saray Ash. I have discussed key elements of the care plan with the PA or APRN and I agree with the findings and care plan as stated above unless otherwise noted.       Additional Comments:     Chief complaint:  Exposed achilles

## 2017-11-27 NOTE — H&P
hematuria. Musculoskeletal  no back pain, gait disturbance, or myalgia. Skin  no color change, rash, pallor, large leg wound right posterior leg. Neurologic  no dizziness, facial asymmetry, or light headedness. No seizures. No speech difficulty or lateralizing weakness. Hematologic  no easy bruising or excessive bleeding. Psychiatric  no severe anxiety or nervousness. No confusion. All other review of systems are negative. Physical Exam    /64   Pulse 85   Temp 98.3 °F (36.8 °C) (Oral)   Resp 16   SpO2 99%     Constitutional  well developed, well nourished. No diaphoresis or acute distress. HENT  head normocephalic. Septum appears midline. Eyes  conjunctiva normal.  EOMS normal.  No exudate. No icterus. Neck- ROM appears normal, no tracheal deviation. Cardiovascular  Regular rate and rhythm. Heart sounds are normal.  No murmur, rub, or gallop. Carotid pulses are 2+ to palpation bilaterally without bruit. Extremities - Radial and brachial pulses are 2+ to palpation bilaterally. Right femoral pulse: present 2+; Right popliteal pulse: present 1+ Right DP: barely palpable; Right PT absent; Left femoral pulse: present 2+; Left popliteal pulse: present 1+; Left DP: barely palpable; Left PT: barely palpable  No cyanosis, clubbing, or significant edema. No signs atheroembolic event. Pulmonary  effort appears normal.  No respiratory distress. Lungs - Breath sounds normal. No wheezes or rales. GI - Abdomen  soft, non tender, bowel sounds X 4 quadrants. No guarding or rebound tenderness. No distension or palpable mass. Genitourinary  deferred. Musculoskeletal  ROM appears normal.  No significant edema. Neurologic  alert and oriented X 3. Physiologic.   Psychiatric  mood, affect, and behavior appear normal.  Judgment and thought processes appear normal.  Skin  wound posterior right leg with exposed necrotic achilles tendon    Post Debridement Measurements and Assessment:  Diabetic Ulcer 10/30/17 Ankle Right WOUND 1 RIGHT ACHILLES DIABETIC  (Active)   Janel-wound Assessment Pink;Red 11/22/2017 11:49 AM   Janel-Wound Texture Scarring 11/22/2017 11:49 AM   Janel-Wound Moisture Intact;Dry 11/22/2017 11:49 AM   Janel-Wound Color Pink 11/22/2017 11:49 AM   Diabetic Wound - Bridget Hard 1 11/22/2017 11:49 AM   Non-staged Wound Description Not applicable 87/18/0935 96:22 AM   Wound Assessment Granulation tissue;Slough; Yellow 11/22/2017 11:49 AM   Shape roundish 10/30/2017  8:54 AM   Wound Length (cm) 4.5 cm 11/22/2017 11:49 AM   Wound Width (cm) 5 cm 11/22/2017 11:49 AM   Wound Depth (cm)  0.4 11/22/2017 11:49 AM   Calculated Wound Size (cm^2) (l*w) 22.5 cm^2 11/22/2017 11:49 AM   Change in Wound Size % (l*w) -234.82 11/22/2017 11:49 AM   Dressing Status Old drainage 11/22/2017 11:49 AM   Dressing Changed Changed/New 11/22/2017 12:22 PM   Wound Cleansed Rinsed/Irrigated with saline 11/22/2017 11:49 AM   Necrotic Type Yellow Fibrin/Slough 11/22/2017 11:49 AM   Necrotic Amount Small: 1-33% 11/22/2017 11:49 AM   Granulation Quality Pink 11/22/2017 11:49 AM   Drainage Amount Large 11/22/2017 11:49 AM   Drainage Description Serosanguinous 11/22/2017 11:49 AM   Odor Strong 11/22/2017 11:49 AM   Epithelialization None present 11/22/2017 11:49 AM   Distance Tunneling (cm) 0 cm 11/22/2017 11:49 AM   Tunneling Position ___ O'Clock 0 11/22/2017 11:49 AM   Tunneling Maxium Distance (cm) 0 11/22/2017 11:49 AM   Undermining Starts ___ O'Clock 0 11/22/2017 11:49 AM   Undermining Ends___ O'Clock 0 11/22/2017 11:49 AM   Undermining Maxium Distance (cm) 0 11/22/2017 11:49 AM   Darrington%Wound Bed 10 11/22/2017 11:49 AM   Red%Wound Bed 0 11/22/2017 11:49 AM   Yellow%Wound Bed 90 11/22/2017 11:49 AM   Black%Wound Bed 0 11/22/2017 11:49 AM   Purple%Wound Bed 0 11/22/2017 11:49 AM   Margins Attached edges 11/22/2017 11:49 AM   Exposed structure Tendon 11/22/2017 11:49 AM   Debridement per physician None 11/22/2017 12:22 PM   Time out N/A 11/22/2017 12:22 PM   Procedural Pain 0 11/6/2017  9:48 AM   Post procedural Pain 0 11/6/2017  9:48 AM   Number of days: 28        KleberLEGUSTABO 11-6-17        Based on ankle-brachial indices and doppler waveforms, the patient has    relatively normal flow to the bilateral lower extremity arterial system at   [facility]    Note is made of loss of diastolic Posterior tibial artery waveform on the    right. This could be subtle evidence of tibial disease. Also distal tibial    vessels are non-compressible and poorly compressible, indicative of    calcification and stiffness of blood vessels as is often seen in    diabetics.        Signature    WALLY 2.07, toe pressure 95, Left WALLY 2.07, toe pressure 102    ----------------------------------------------------------------    Electronically signed by Arcadio Cooley MD(Interpreting    physician) on 11/06/2017 06:50 PM              Assessment/Plan  1. Non-healing wound right leg with cellulitis and necrotic tissue  2. IV antibiotics-Vancomycin, Merrem  3. Will need surgical debridement  4. Prognosis for leg is guarded. 5. May need A-gram as I am unsure of reliability of non-invasive tests  6. Problems include, CHF, Valve prosthetic, Cirrhosis, ,CKD, Emphysema (on home O2),  GOUT, sLEEP aPNEA,  HTN, Hyperlipedemia.  Glaucoma, Diabetes ?control          Recommend no smoking  Offloading instructions given

## 2017-11-28 ENCOUNTER — ANESTHESIA EVENT (OUTPATIENT)
Dept: OPERATING ROOM | Age: 67
DRG: 240 | End: 2017-11-28
Payer: COMMERCIAL

## 2017-11-28 ENCOUNTER — ANESTHESIA (OUTPATIENT)
Dept: OPERATING ROOM | Age: 67
DRG: 240 | End: 2017-11-28
Payer: COMMERCIAL

## 2017-11-28 VITALS
SYSTOLIC BLOOD PRESSURE: 109 MMHG | TEMPERATURE: 98.4 F | OXYGEN SATURATION: 98 % | DIASTOLIC BLOOD PRESSURE: 44 MMHG | RESPIRATION RATE: 38 BRPM

## 2017-11-28 LAB
ABO/RH: NORMAL
ALBUMIN SERPL-MCNC: 3.1 G/DL (ref 3.5–5.2)
ALP BLD-CCNC: 183 U/L (ref 40–130)
ALT SERPL-CCNC: 30 U/L (ref 5–41)
ANION GAP SERPL CALCULATED.3IONS-SCNC: 8 MMOL/L (ref 7–19)
ANTIBODY SCREEN: NORMAL
AST SERPL-CCNC: 32 U/L (ref 5–40)
BILIRUB SERPL-MCNC: 0.3 MG/DL (ref 0.2–1.2)
BUN BLDV-MCNC: 29 MG/DL (ref 8–23)
CALCIUM SERPL-MCNC: 8.3 MG/DL (ref 8.8–10.2)
CHLORIDE BLD-SCNC: 91 MMOL/L (ref 98–111)
CO2: 38 MMOL/L (ref 22–29)
CREAT SERPL-MCNC: 1.1 MG/DL (ref 0.5–1.2)
GFR NON-AFRICAN AMERICAN: >60
GLUCOSE BLD-MCNC: 142 MG/DL (ref 70–99)
GLUCOSE BLD-MCNC: 173 MG/DL (ref 74–109)
GLUCOSE BLD-MCNC: 245 MG/DL (ref 70–99)
GLUCOSE BLD-MCNC: 420 MG/DL (ref 70–99)
GLUCOSE BLD-MCNC: 448 MG/DL (ref 70–99)
HBA1C MFR BLD: 6.7 %
HCT VFR BLD CALC: 28.5 % (ref 42–52)
HCT VFR BLD CALC: 30.5 % (ref 42–52)
HEMOGLOBIN: 8.6 G/DL (ref 14–18)
HEMOGLOBIN: 9.2 G/DL (ref 14–18)
INR BLD: 2.56 (ref 0.88–1.18)
MCH RBC QN AUTO: 25.1 PG (ref 27–31)
MCH RBC QN AUTO: 25.1 PG (ref 27–31)
MCHC RBC AUTO-ENTMCNC: 30.2 G/DL (ref 33–37)
MCHC RBC AUTO-ENTMCNC: 30.2 G/DL (ref 33–37)
MCV RBC AUTO: 83.1 FL (ref 80–94)
MCV RBC AUTO: 83.3 FL (ref 80–94)
PDW BLD-RTO: 15.8 % (ref 11.5–14.5)
PDW BLD-RTO: 15.9 % (ref 11.5–14.5)
PERFORMED ON: ABNORMAL
PLATELET # BLD: 192 K/UL (ref 130–400)
PLATELET # BLD: 224 K/UL (ref 130–400)
PMV BLD AUTO: 10 FL (ref 9.4–12.4)
PMV BLD AUTO: 9.4 FL (ref 9.4–12.4)
POTASSIUM SERPL-SCNC: 3.7 MMOL/L (ref 3.5–5)
PROTHROMBIN TIME: 27.8 SEC (ref 12–14.6)
RBC # BLD: 3.42 M/UL (ref 4.7–6.1)
RBC # BLD: 3.67 M/UL (ref 4.7–6.1)
SODIUM BLD-SCNC: 137 MMOL/L (ref 136–145)
TOTAL PROTEIN: 5.8 G/DL (ref 6.6–8.7)
WBC # BLD: 6.7 K/UL (ref 4.8–10.8)
WBC # BLD: 9.3 K/UL (ref 4.8–10.8)

## 2017-11-28 PROCEDURE — 3600000004 HC SURGERY LEVEL 4 BASE: Performed by: SURGERY

## 2017-11-28 PROCEDURE — 0Y6H0Z2 DETACHMENT AT RIGHT LOWER LEG, MID, OPEN APPROACH: ICD-10-PCS | Performed by: SURGERY

## 2017-11-28 PROCEDURE — 6370000000 HC RX 637 (ALT 250 FOR IP): Performed by: SURGERY

## 2017-11-28 PROCEDURE — 36415 COLL VENOUS BLD VENIPUNCTURE: CPT

## 2017-11-28 PROCEDURE — 27882 AMPUTATION OF LOWER LEG: CPT | Performed by: SURGERY

## 2017-11-28 PROCEDURE — 2700000000 HC OXYGEN THERAPY PER DAY

## 2017-11-28 PROCEDURE — 7100000001 HC PACU RECOVERY - ADDTL 15 MIN: Performed by: SURGERY

## 2017-11-28 PROCEDURE — 86850 RBC ANTIBODY SCREEN: CPT

## 2017-11-28 PROCEDURE — 2580000003 HC RX 258: Performed by: NURSE ANESTHETIST, CERTIFIED REGISTERED

## 2017-11-28 PROCEDURE — 86900 BLOOD TYPING SEROLOGIC ABO: CPT

## 2017-11-28 PROCEDURE — 2580000003 HC RX 258: Performed by: SURGERY

## 2017-11-28 PROCEDURE — 2720000001 HC MISC SURG SUPPLY STERILE $51-500: Performed by: SURGERY

## 2017-11-28 PROCEDURE — 85027 COMPLETE CBC AUTOMATED: CPT

## 2017-11-28 PROCEDURE — 99231 SBSQ HOSP IP/OBS SF/LOW 25: CPT | Performed by: SURGERY

## 2017-11-28 PROCEDURE — 82948 REAGENT STRIP/BLOOD GLUCOSE: CPT

## 2017-11-28 PROCEDURE — 2580000003 HC RX 258: Performed by: ANESTHESIOLOGY

## 2017-11-28 PROCEDURE — 6360000002 HC RX W HCPCS: Performed by: NURSE ANESTHETIST, CERTIFIED REGISTERED

## 2017-11-28 PROCEDURE — 1210000000 HC MED SURG R&B

## 2017-11-28 PROCEDURE — 3600000014 HC SURGERY LEVEL 4 ADDTL 15MIN: Performed by: SURGERY

## 2017-11-28 PROCEDURE — 80053 COMPREHEN METABOLIC PANEL: CPT

## 2017-11-28 PROCEDURE — 6360000002 HC RX W HCPCS: Performed by: SURGERY

## 2017-11-28 PROCEDURE — 6360000002 HC RX W HCPCS: Performed by: NURSE PRACTITIONER

## 2017-11-28 PROCEDURE — 85610 PROTHROMBIN TIME: CPT

## 2017-11-28 PROCEDURE — 7100000000 HC PACU RECOVERY - FIRST 15 MIN: Performed by: SURGERY

## 2017-11-28 PROCEDURE — 88305 TISSUE EXAM BY PATHOLOGIST: CPT

## 2017-11-28 PROCEDURE — 3700000001 HC ADD 15 MINUTES (ANESTHESIA): Performed by: SURGERY

## 2017-11-28 PROCEDURE — 6370000000 HC RX 637 (ALT 250 FOR IP): Performed by: NURSE PRACTITIONER

## 2017-11-28 PROCEDURE — 2500000003 HC RX 250 WO HCPCS: Performed by: NURSE ANESTHETIST, CERTIFIED REGISTERED

## 2017-11-28 PROCEDURE — 83036 HEMOGLOBIN GLYCOSYLATED A1C: CPT

## 2017-11-28 PROCEDURE — 2580000003 HC RX 258: Performed by: HOSPITALIST

## 2017-11-28 PROCEDURE — 86901 BLOOD TYPING SEROLOGIC RH(D): CPT

## 2017-11-28 PROCEDURE — 3700000000 HC ANESTHESIA ATTENDED CARE: Performed by: SURGERY

## 2017-11-28 RX ORDER — LIDOCAINE HYDROCHLORIDE 10 MG/ML
1 INJECTION, SOLUTION EPIDURAL; INFILTRATION; INTRACAUDAL; PERINEURAL
Status: DISCONTINUED | OUTPATIENT
Start: 2017-11-28 | End: 2017-11-28 | Stop reason: HOSPADM

## 2017-11-28 RX ORDER — FENTANYL CITRATE 50 UG/ML
25 INJECTION, SOLUTION INTRAMUSCULAR; INTRAVENOUS
Status: DISCONTINUED | OUTPATIENT
Start: 2017-11-28 | End: 2017-11-28 | Stop reason: HOSPADM

## 2017-11-28 RX ORDER — SODIUM CHLORIDE 0.9 % (FLUSH) 0.9 %
10 SYRINGE (ML) INJECTION PRN
Status: DISCONTINUED | OUTPATIENT
Start: 2017-11-28 | End: 2017-11-28 | Stop reason: HOSPADM

## 2017-11-28 RX ORDER — PROPOFOL 10 MG/ML
INJECTION, EMULSION INTRAVENOUS PRN
Status: DISCONTINUED | OUTPATIENT
Start: 2017-11-28 | End: 2017-11-28 | Stop reason: SDUPTHER

## 2017-11-28 RX ORDER — HYDROCODONE BITARTRATE AND ACETAMINOPHEN 5; 325 MG/1; MG/1
1 TABLET ORAL EVERY 4 HOURS PRN
Status: DISCONTINUED | OUTPATIENT
Start: 2017-11-28 | End: 2017-12-01

## 2017-11-28 RX ORDER — METOCLOPRAMIDE HYDROCHLORIDE 5 MG/ML
10 INJECTION INTRAMUSCULAR; INTRAVENOUS
Status: DISCONTINUED | OUTPATIENT
Start: 2017-11-28 | End: 2017-11-28 | Stop reason: HOSPADM

## 2017-11-28 RX ORDER — ROCURONIUM BROMIDE 10 MG/ML
INJECTION, SOLUTION INTRAVENOUS PRN
Status: DISCONTINUED | OUTPATIENT
Start: 2017-11-28 | End: 2017-11-28 | Stop reason: SDUPTHER

## 2017-11-28 RX ORDER — KETOROLAC TROMETHAMINE 30 MG/ML
15 INJECTION, SOLUTION INTRAMUSCULAR; INTRAVENOUS EVERY 6 HOURS
Status: DISCONTINUED | OUTPATIENT
Start: 2017-11-28 | End: 2017-11-29

## 2017-11-28 RX ORDER — FENTANYL CITRATE 50 UG/ML
50 INJECTION, SOLUTION INTRAMUSCULAR; INTRAVENOUS
Status: DISCONTINUED | OUTPATIENT
Start: 2017-11-28 | End: 2017-11-28 | Stop reason: HOSPADM

## 2017-11-28 RX ORDER — DEXAMETHASONE SODIUM PHOSPHATE 10 MG/ML
INJECTION INTRAMUSCULAR; INTRAVENOUS PRN
Status: DISCONTINUED | OUTPATIENT
Start: 2017-11-28 | End: 2017-11-28 | Stop reason: SDUPTHER

## 2017-11-28 RX ORDER — SODIUM CHLORIDE 9 MG/ML
INJECTION, SOLUTION INTRAVENOUS CONTINUOUS
Status: DISCONTINUED | OUTPATIENT
Start: 2017-11-28 | End: 2017-12-01

## 2017-11-28 RX ORDER — SODIUM CHLORIDE, SODIUM LACTATE, POTASSIUM CHLORIDE, CALCIUM CHLORIDE 600; 310; 30; 20 MG/100ML; MG/100ML; MG/100ML; MG/100ML
INJECTION, SOLUTION INTRAVENOUS CONTINUOUS PRN
Status: DISCONTINUED | OUTPATIENT
Start: 2017-11-28 | End: 2017-11-28 | Stop reason: SDUPTHER

## 2017-11-28 RX ORDER — SODIUM CHLORIDE 0.9 % (FLUSH) 0.9 %
10 SYRINGE (ML) INJECTION EVERY 12 HOURS SCHEDULED
Status: DISCONTINUED | OUTPATIENT
Start: 2017-11-28 | End: 2017-11-28 | Stop reason: HOSPADM

## 2017-11-28 RX ORDER — ENALAPRILAT 2.5 MG/2ML
1.25 INJECTION INTRAVENOUS
Status: DISCONTINUED | OUTPATIENT
Start: 2017-11-28 | End: 2017-11-28 | Stop reason: HOSPADM

## 2017-11-28 RX ORDER — METOPROLOL TARTRATE 5 MG/5ML
INJECTION INTRAVENOUS PRN
Status: DISCONTINUED | OUTPATIENT
Start: 2017-11-28 | End: 2017-11-28 | Stop reason: SDUPTHER

## 2017-11-28 RX ORDER — MIDAZOLAM HYDROCHLORIDE 1 MG/ML
2 INJECTION INTRAMUSCULAR; INTRAVENOUS
Status: DISCONTINUED | OUTPATIENT
Start: 2017-11-28 | End: 2017-11-28 | Stop reason: HOSPADM

## 2017-11-28 RX ORDER — ACETAMINOPHEN 325 MG/1
650 TABLET ORAL EVERY 4 HOURS PRN
Status: DISCONTINUED | OUTPATIENT
Start: 2017-11-28 | End: 2017-12-01

## 2017-11-28 RX ORDER — SODIUM CHLORIDE 9 MG/ML
INJECTION, SOLUTION INTRAVENOUS ONCE
Status: DISCONTINUED | OUTPATIENT
Start: 2017-11-28 | End: 2017-11-28 | Stop reason: HOSPADM

## 2017-11-28 RX ORDER — MORPHINE SULFATE 10 MG/ML
INJECTION, SOLUTION INTRAMUSCULAR; INTRAVENOUS PRN
Status: DISCONTINUED | OUTPATIENT
Start: 2017-11-28 | End: 2017-11-28 | Stop reason: SDUPTHER

## 2017-11-28 RX ORDER — MORPHINE SULFATE 4 MG/ML
2 INJECTION, SOLUTION INTRAMUSCULAR; INTRAVENOUS
Status: DISCONTINUED | OUTPATIENT
Start: 2017-11-28 | End: 2017-12-06 | Stop reason: HOSPADM

## 2017-11-28 RX ORDER — DIPHENHYDRAMINE HYDROCHLORIDE 50 MG/ML
12.5 INJECTION INTRAMUSCULAR; INTRAVENOUS
Status: DISCONTINUED | OUTPATIENT
Start: 2017-11-28 | End: 2017-11-28 | Stop reason: HOSPADM

## 2017-11-28 RX ORDER — FENTANYL CITRATE 50 UG/ML
INJECTION, SOLUTION INTRAMUSCULAR; INTRAVENOUS PRN
Status: DISCONTINUED | OUTPATIENT
Start: 2017-11-28 | End: 2017-11-28 | Stop reason: SDUPTHER

## 2017-11-28 RX ORDER — MORPHINE SULFATE 1 MG/ML
4 INJECTION, SOLUTION EPIDURAL; INTRATHECAL; INTRAVENOUS EVERY 5 MIN PRN
Status: DISCONTINUED | OUTPATIENT
Start: 2017-11-28 | End: 2017-11-28 | Stop reason: HOSPADM

## 2017-11-28 RX ORDER — LABETALOL HYDROCHLORIDE 5 MG/ML
5 INJECTION, SOLUTION INTRAVENOUS EVERY 10 MIN PRN
Status: DISCONTINUED | OUTPATIENT
Start: 2017-11-28 | End: 2017-11-28 | Stop reason: HOSPADM

## 2017-11-28 RX ORDER — HYDRALAZINE HYDROCHLORIDE 20 MG/ML
5 INJECTION INTRAMUSCULAR; INTRAVENOUS EVERY 10 MIN PRN
Status: DISCONTINUED | OUTPATIENT
Start: 2017-11-28 | End: 2017-11-28 | Stop reason: HOSPADM

## 2017-11-28 RX ORDER — ONDANSETRON 2 MG/ML
INJECTION INTRAMUSCULAR; INTRAVENOUS PRN
Status: DISCONTINUED | OUTPATIENT
Start: 2017-11-28 | End: 2017-11-28 | Stop reason: SDUPTHER

## 2017-11-28 RX ORDER — LIDOCAINE HYDROCHLORIDE 10 MG/ML
INJECTION, SOLUTION EPIDURAL; INFILTRATION; INTRACAUDAL; PERINEURAL PRN
Status: DISCONTINUED | OUTPATIENT
Start: 2017-11-28 | End: 2017-11-28 | Stop reason: SDUPTHER

## 2017-11-28 RX ORDER — PROMETHAZINE HYDROCHLORIDE 25 MG/ML
6.25 INJECTION, SOLUTION INTRAMUSCULAR; INTRAVENOUS
Status: DISCONTINUED | OUTPATIENT
Start: 2017-11-28 | End: 2017-11-28 | Stop reason: HOSPADM

## 2017-11-28 RX ORDER — SODIUM CHLORIDE 9 MG/ML
INJECTION, SOLUTION INTRAVENOUS CONTINUOUS
Status: DISCONTINUED | OUTPATIENT
Start: 2017-11-28 | End: 2017-11-28

## 2017-11-28 RX ORDER — MEPERIDINE HYDROCHLORIDE 50 MG/ML
12.5 INJECTION INTRAMUSCULAR; INTRAVENOUS; SUBCUTANEOUS EVERY 5 MIN PRN
Status: DISCONTINUED | OUTPATIENT
Start: 2017-11-28 | End: 2017-11-28 | Stop reason: HOSPADM

## 2017-11-28 RX ORDER — MORPHINE SULFATE 1 MG/ML
2 INJECTION, SOLUTION EPIDURAL; INTRATHECAL; INTRAVENOUS EVERY 5 MIN PRN
Status: DISCONTINUED | OUTPATIENT
Start: 2017-11-28 | End: 2017-11-28 | Stop reason: HOSPADM

## 2017-11-28 RX ORDER — SODIUM CHLORIDE, SODIUM LACTATE, POTASSIUM CHLORIDE, CALCIUM CHLORIDE 600; 310; 30; 20 MG/100ML; MG/100ML; MG/100ML; MG/100ML
INJECTION, SOLUTION INTRAVENOUS CONTINUOUS
Status: DISCONTINUED | OUTPATIENT
Start: 2017-11-28 | End: 2017-11-28

## 2017-11-28 RX ORDER — HYDROCODONE BITARTRATE AND ACETAMINOPHEN 5; 325 MG/1; MG/1
2 TABLET ORAL EVERY 4 HOURS PRN
Status: DISCONTINUED | OUTPATIENT
Start: 2017-11-28 | End: 2017-12-01

## 2017-11-28 RX ORDER — ONDANSETRON 2 MG/ML
4 INJECTION INTRAMUSCULAR; INTRAVENOUS EVERY 6 HOURS PRN
Status: DISCONTINUED | OUTPATIENT
Start: 2017-11-28 | End: 2017-12-06 | Stop reason: HOSPADM

## 2017-11-28 RX ORDER — CHLORHEXIDINE GLUCONATE 4 G/100ML
SOLUTION TOPICAL ONCE
Status: COMPLETED | OUTPATIENT
Start: 2017-11-28 | End: 2017-11-28

## 2017-11-28 RX ORDER — 0.9 % SODIUM CHLORIDE 0.9 %
500 INTRAVENOUS SOLUTION INTRAVENOUS ONCE
Status: COMPLETED | OUTPATIENT
Start: 2017-11-28 | End: 2017-11-28

## 2017-11-28 RX ADMIN — SODIUM CHLORIDE: 9 INJECTION, SOLUTION INTRAVENOUS at 14:41

## 2017-11-28 RX ADMIN — METOPROLOL TARTRATE 1 MG: 5 INJECTION, SOLUTION INTRAVENOUS at 11:19

## 2017-11-28 RX ADMIN — LATANOPROST 1 DROP: 50 SOLUTION OPHTHALMIC at 21:16

## 2017-11-28 RX ADMIN — DEXAMETHASONE SODIUM PHOSPHATE 10 MG: 10 INJECTION INTRAMUSCULAR; INTRAVENOUS at 10:44

## 2017-11-28 RX ADMIN — GUAIFENESIN 1200 MG: 600 TABLET, EXTENDED RELEASE ORAL at 21:16

## 2017-11-28 RX ADMIN — VANCOMYCIN HYDROCHLORIDE 1250 MG: 1 INJECTION, POWDER, LYOPHILIZED, FOR SOLUTION INTRAVENOUS at 14:45

## 2017-11-28 RX ADMIN — PHENYLEPHRINE HYDROCHLORIDE 80 MCG: 10 INJECTION INTRAVENOUS at 10:53

## 2017-11-28 RX ADMIN — ROCURONIUM BROMIDE 20 MG: 10 INJECTION INTRAVENOUS at 11:02

## 2017-11-28 RX ADMIN — MEROPENEM 1 G: 1 INJECTION, POWDER, FOR SOLUTION INTRAVENOUS at 05:54

## 2017-11-28 RX ADMIN — ASPIRIN 81 MG CHEWABLE TABLET 81 MG: 81 TABLET CHEWABLE at 15:20

## 2017-11-28 RX ADMIN — SUGAMMADEX 110 MG: 100 INJECTION, SOLUTION INTRAVENOUS at 11:58

## 2017-11-28 RX ADMIN — PHENYLEPHRINE HYDROCHLORIDE 160 MCG: 10 INJECTION INTRAVENOUS at 11:24

## 2017-11-28 RX ADMIN — MEROPENEM 1 G: 1 INJECTION, POWDER, FOR SOLUTION INTRAVENOUS at 21:28

## 2017-11-28 RX ADMIN — ROCURONIUM BROMIDE 10 MG: 10 INJECTION INTRAVENOUS at 11:30

## 2017-11-28 RX ADMIN — PROPOFOL 200 MG: 10 INJECTION, EMULSION INTRAVENOUS at 10:34

## 2017-11-28 RX ADMIN — MORPHINE SULFATE 5 MG: 10 INJECTION INTRAMUSCULAR; INTRAVENOUS; SUBCUTANEOUS at 12:00

## 2017-11-28 RX ADMIN — HYDROCODONE BITARTRATE AND ACETAMINOPHEN 1 TABLET: 5; 325 TABLET ORAL at 02:54

## 2017-11-28 RX ADMIN — TRAZODONE HYDROCHLORIDE 100 MG: 100 TABLET ORAL at 21:16

## 2017-11-28 RX ADMIN — PHENYLEPHRINE HYDROCHLORIDE 160 MCG: 10 INJECTION INTRAVENOUS at 11:28

## 2017-11-28 RX ADMIN — MORPHINE SULFATE 2 MG: 4 INJECTION, SOLUTION INTRAMUSCULAR; INTRAVENOUS at 17:10

## 2017-11-28 RX ADMIN — PHENYLEPHRINE HYDROCHLORIDE 50 MCG/MIN: 10 INJECTION INTRAVENOUS at 11:36

## 2017-11-28 RX ADMIN — PHENYLEPHRINE HYDROCHLORIDE 80 MCG: 10 INJECTION INTRAVENOUS at 11:14

## 2017-11-28 RX ADMIN — PHENYLEPHRINE HYDROCHLORIDE 80 MCG: 10 INJECTION INTRAVENOUS at 10:41

## 2017-11-28 RX ADMIN — FENTANYL CITRATE 100 MCG: 50 INJECTION, SOLUTION INTRAMUSCULAR; INTRAVENOUS at 10:30

## 2017-11-28 RX ADMIN — SODIUM CHLORIDE 500 ML: 9 INJECTION, SOLUTION INTRAVENOUS at 19:12

## 2017-11-28 RX ADMIN — KETOROLAC TROMETHAMINE 15 MG: 30 INJECTION, SOLUTION INTRAMUSCULAR at 21:17

## 2017-11-28 RX ADMIN — ENOXAPARIN SODIUM 80 MG: 80 INJECTION SUBCUTANEOUS at 21:17

## 2017-11-28 RX ADMIN — METOPROLOL TARTRATE 1 MG: 5 INJECTION, SOLUTION INTRAVENOUS at 11:31

## 2017-11-28 RX ADMIN — ROCURONIUM BROMIDE 50 MG: 10 INJECTION INTRAVENOUS at 10:35

## 2017-11-28 RX ADMIN — GABAPENTIN 300 MG: 300 CAPSULE ORAL at 21:18

## 2017-11-28 RX ADMIN — VANCOMYCIN HYDROCHLORIDE 1250 MG: 1 INJECTION, POWDER, LYOPHILIZED, FOR SOLUTION INTRAVENOUS at 10:39

## 2017-11-28 RX ADMIN — PHENYLEPHRINE HYDROCHLORIDE 80 MCG: 10 INJECTION INTRAVENOUS at 11:02

## 2017-11-28 RX ADMIN — SODIUM CHLORIDE, SODIUM LACTATE, POTASSIUM CHLORIDE, AND CALCIUM CHLORIDE: 600; 310; 30; 20 INJECTION, SOLUTION INTRAVENOUS at 10:15

## 2017-11-28 RX ADMIN — MORPHINE SULFATE 5 MG: 10 INJECTION INTRAMUSCULAR; INTRAVENOUS; SUBCUTANEOUS at 12:05

## 2017-11-28 RX ADMIN — PHENYLEPHRINE HYDROCHLORIDE 80 MCG: 10 INJECTION INTRAVENOUS at 10:35

## 2017-11-28 RX ADMIN — HYDROCODONE BITARTRATE AND ACETAMINOPHEN 2 TABLET: 5; 325 TABLET ORAL at 15:03

## 2017-11-28 RX ADMIN — PHENYLEPHRINE HYDROCHLORIDE 80 MCG: 10 INJECTION INTRAVENOUS at 10:46

## 2017-11-28 RX ADMIN — INSULIN LISPRO 18 UNITS: 100 INJECTION, SOLUTION INTRAVENOUS; SUBCUTANEOUS at 17:10

## 2017-11-28 RX ADMIN — Medication 10 ML: at 21:19

## 2017-11-28 RX ADMIN — LEVOTHYROXINE SODIUM 75 MCG: 75 TABLET ORAL at 05:55

## 2017-11-28 RX ADMIN — INSULIN GLARGINE 42 UNITS: 100 INJECTION, SOLUTION SUBCUTANEOUS at 21:13

## 2017-11-28 RX ADMIN — CHLORHEXIDINE GLUCONATE: 213 SOLUTION TOPICAL at 14:47

## 2017-11-28 RX ADMIN — ALLOPURINOL 100 MG: 100 TABLET ORAL at 21:18

## 2017-11-28 RX ADMIN — ONDANSETRON HYDROCHLORIDE 4 MG: 2 SOLUTION INTRAMUSCULAR; INTRAVENOUS at 11:43

## 2017-11-28 RX ADMIN — INSULIN LISPRO 9 UNITS: 100 INJECTION, SOLUTION INTRAVENOUS; SUBCUTANEOUS at 21:12

## 2017-11-28 RX ADMIN — LACTULOSE 20 G: 10 SOLUTION ORAL at 21:17

## 2017-11-28 RX ADMIN — PHENYLEPHRINE HYDROCHLORIDE 160 MCG: 10 INJECTION INTRAVENOUS at 11:38

## 2017-11-28 RX ADMIN — ATORVASTATIN CALCIUM 40 MG: 40 TABLET, FILM COATED ORAL at 21:17

## 2017-11-28 RX ADMIN — KETOROLAC TROMETHAMINE 15 MG: 30 INJECTION, SOLUTION INTRAMUSCULAR at 15:20

## 2017-11-28 RX ADMIN — SODIUM CHLORIDE, SODIUM LACTATE, POTASSIUM CHLORIDE, AND CALCIUM CHLORIDE: 600; 310; 30; 20 INJECTION, SOLUTION INTRAVENOUS at 10:22

## 2017-11-28 RX ADMIN — LIDOCAINE HYDROCHLORIDE 50 MG: 10 INJECTION, SOLUTION EPIDURAL; INFILTRATION; INTRACAUDAL; PERINEURAL at 10:34

## 2017-11-28 RX ADMIN — FLUOXETINE 20 MG: 20 CAPSULE ORAL at 21:18

## 2017-11-28 ASSESSMENT — PAIN SCALES - GENERAL
PAINLEVEL_OUTOF10: 0
PAINLEVEL_OUTOF10: 0
PAINLEVEL_OUTOF10: 2
PAINLEVEL_OUTOF10: 8
PAINLEVEL_OUTOF10: 4
PAINLEVEL_OUTOF10: 0
PAINLEVEL_OUTOF10: 0
PAINLEVEL_OUTOF10: 4
PAINLEVEL_OUTOF10: 2
PAINLEVEL_OUTOF10: 3
PAINLEVEL_OUTOF10: 8

## 2017-11-28 ASSESSMENT — LIFESTYLE VARIABLES: SMOKING_STATUS: 1

## 2017-11-28 NOTE — ANESTHESIA POSTPROCEDURE EVALUATION
Department of Anesthesiology  Postprocedure Note    Patient: Laura Gibbs  MRN: [id number]  YOB: 1950  Date of evaluation: 11/28/2017  Time:  12:09 PM     Procedure Summary     Date:  11/28/17 Room / Location:  [facility] OR [address] [place] [address] OR    Anesthesia Start:  1022 Anesthesia Stop:      Procedure:  Exploration of right lower extremity, Right open BELOW KNEE AMPUTATION (Right ) Diagnosis:  (PVD)    Surgeon:  Cj Orosco MD Responsible Provider:  Bernice Castro CRNA    Anesthesia Type:  general ASA Status:  4          Anesthesia Type: general    Alexandra Phase I:      Alexandra Phase II:      Last vitals: Reviewed and per EMR flowsheets. Anesthesia Post Evaluation    Patient location during evaluation: PACU  Patient participation: waiting for patient participation  Level of consciousness: awake and lethargic  Pain score: 3  Airway patency: patent  Nausea & Vomiting: no nausea and no vomiting  Complications: no  Cardiovascular status: blood pressure returned to baseline  Respiratory status: acceptable  Hydration status: euvolemic  Comments: Pt transported with oxygen.   Report to RN

## 2017-11-28 NOTE — PROGRESS NOTES
Pt awake and alert with no acute distress. Dressing to right stump with old drainage present. Stump elevated on pillow. Awaiting results of cbc.

## 2017-11-28 NOTE — PROGRESS NOTES
Results of H/H called to dr. Saeed Villasenor. Orders to increase ivf of ns to 100ml/hr while on floor. Ok to send to 3rd floor.

## 2017-11-28 NOTE — PROGRESS NOTES
Dr. Phyllis Hamilton At bedside.     Electronically signed by Dayan Garrison RN on 11/28/2017 at 12:25 PM

## 2017-11-28 NOTE — PROGRESS NOTES
Nutrition Assessment    Type and Reason for Visit: Initial, Positive Nutrition Screen    Nutrition Recommendations: follow for advanced diet    Malnutrition Assessment:  · Malnutrition Status: At risk for malnutrition    Nutrition Diagnosis:   · Problem: Increased nutrient needs  · Etiology: related to Increased demand for energy/nutrients due to     Signs and symptoms:  as evidenced by Presence of wounds    Nutrition Assessment:  · Subjective Assessment: Positive nutrition screen for wound. Aware pt NPO for possible debridment. · Wound Type: Diabetic Ulcer  · Current Nutrition Therapies:  · Oral Diet Orders: NPO   · Oral Diet intake: NPO  · Oral Nutrition Supplement (ONS) Orders: None  · ONS intake: NPO     · Anthropometric Measures:  · Ht: 5' 11\" (180.3 cm)   · Current Body Wt: 186 lb 8 oz (84.6 kg)  · Admission Body Wt: 184 lb 6 oz (83.6 kg)  · Usual Body Wt: 179 lb (81.2 kg)  · Ideal Body Wt: 172 lb (78 kg),  · BMI Classification: BMI 25.0 - 29.9 Overweight    Estimated Intake vs Estimated Needs: Intake Less Than Needs    Nutrition Risk Level: High    Nutrition Interventions:   Continue NPO  Continued Inpatient Monitoring    Nutrition Evaluation:   · Evaluation: Goals set   · Goals: meet nutritional needs through po intake or JUDY    · Monitoring: NPO Status, Diet Tolerance, Skin Integrity, Wound Healing, Weight, Pertinent Labs    See Adult Nutrition Doc Flowsheet for more detail.      Electronically signed by Flako Varela MS, RD, LD on 11/28/17 at 12:21 PM    Contact Number: [phone]

## 2017-11-28 NOTE — PROGRESS NOTES
Dr. Patrice Diallo at bed side.     Electronically signed by Rocio Rabago RN on 11/28/2017 at 1:25 PM

## 2017-11-28 NOTE — PROGRESS NOTES
that he may require ICU care post op because of his lung problem and other medical issues post operatively. Assessment/Plan  1. Infected wound with necrotic tissue right leg. 2. PT elevated, may require some FFP periop. Antibiotics continued after surgery due to:   Infection -  []  Abscess    []  Pneumonia    []  Aspiration Pneumonia    []  Sepsis    [x]  Cellulitis    []  Positive culture    []  UTI    []  Osteomyelitis    []  Fungal infection    []  H.pylori                          Right leg infection    DVT prophylaxis       No mechanical prophylaxis due to: wounds lower leg   []  bilateral amputee  []  bilateral lower extremity trauma        []  patient refusal        []  continuing IV heparin         No pharmacologic prophylaxis due to active bleeding        []  bleeding risk        []  GI bleed        []  hemorrhage        []  patient refusal        []  risk of bleeding         []  thrombocytopenia        [x]  supratherapeutic INR    Beta Blocker:  not indicated    Gibbs Catheter:  N/A

## 2017-11-28 NOTE — BRIEF OP NOTE
Brief Postoperative Note  ______________________________________________________________    Patient: Arcola Snellen  YOB: 1950  MRN: [id number]  Date of Procedure: 11/28/2017    Pre-Op Diagnosis: PVD,with diabetic leg infection    Post-Op Diagnosis: Same, extensive muscle and tendon necrotsis       Procedure(s):   RIGHT OPEN BELOW KNEE AMPUTATION    Anesthesia: General  Surgeon(s):  Aminah Hickey MD    Staff:  Scrub Person First: Sharon Galindo; Concetta Lucas  Scrub Person Second: Sanjeev Douglas     Estimated Blood Loss: 562 ml    Complications: None    Specimens:   ID Type Source Tests Collected by Time Destination   A : Right leg- below knee amputation Tissue Leg SURGICAL PATHOLOGY Aminah Hickey MD 11/28/2017 1125            Drains:  Wound left open, plan staged revision in 48-72 hours    Findings: Massive infection with necrotic muscle and tendon lower leg  Plan- continue iv antibiotics, dressing changes, if wound cleans up, plan revision to BKA or AKA in 48-72 hours.   Dictation# [id number]  Aminah Hickey MD  Date: 11/28/2017  Time: 12:55 PM

## 2017-11-28 NOTE — PROGRESS NOTES
Spoke with  at bedside about patient blood pressure. Ok to floor.      Electronically signed by Can Carrizales RN on 11/28/2017 at 1:10 PM

## 2017-11-29 ENCOUNTER — HOSPITAL ENCOUNTER (OUTPATIENT)
Dept: WOUND CARE | Age: 67
Discharge: HOME | End: 2017-11-29

## 2017-11-29 LAB
ALBUMIN SERPL-MCNC: 2.5 G/DL (ref 3.5–5.2)
ALP BLD-CCNC: 122 U/L (ref 40–130)
ALT SERPL-CCNC: 21 U/L (ref 5–41)
ANION GAP SERPL CALCULATED.3IONS-SCNC: 11 MMOL/L (ref 7–19)
AST SERPL-CCNC: 20 U/L (ref 5–40)
BILIRUB SERPL-MCNC: <0.2 MG/DL (ref 0.2–1.2)
BLOOD BANK DISPENSE STATUS: NORMAL
BLOOD BANK PRODUCT CODE: NORMAL
BPU ID: NORMAL
BUN BLDV-MCNC: 47 MG/DL (ref 8–23)
CALCIUM SERPL-MCNC: 7.7 MG/DL (ref 8.8–10.2)
CHLORIDE BLD-SCNC: 91 MMOL/L (ref 98–111)
CO2: 31 MMOL/L (ref 22–29)
CREAT SERPL-MCNC: 2 MG/DL (ref 0.5–1.2)
DESCRIPTION BLOOD BANK: NORMAL
GFR NON-AFRICAN AMERICAN: 33
GLUCOSE BLD-MCNC: 292 MG/DL (ref 70–99)
GLUCOSE BLD-MCNC: 302 MG/DL (ref 70–99)
GLUCOSE BLD-MCNC: 306 MG/DL (ref 70–99)
GLUCOSE BLD-MCNC: 307 MG/DL (ref 70–99)
GLUCOSE BLD-MCNC: 337 MG/DL (ref 70–99)
GLUCOSE BLD-MCNC: 352 MG/DL (ref 74–109)
HCT VFR BLD CALC: 20 % (ref 42–52)
HCT VFR BLD CALC: 24 % (ref 42–52)
HCT VFR BLD CALC: 24.4 % (ref 42–52)
HEMOGLOBIN: 5.9 G/DL (ref 14–18)
HEMOGLOBIN: 7.7 G/DL (ref 14–18)
HEMOGLOBIN: 7.7 G/DL (ref 14–18)
INR BLD: 2.63 (ref 0.88–1.18)
MCH RBC QN AUTO: 24.6 PG (ref 27–31)
MCH RBC QN AUTO: 26.4 PG (ref 27–31)
MCH RBC QN AUTO: 26.6 PG (ref 27–31)
MCHC RBC AUTO-ENTMCNC: 29.5 G/DL (ref 33–37)
MCHC RBC AUTO-ENTMCNC: 31.6 G/DL (ref 33–37)
MCHC RBC AUTO-ENTMCNC: 32.1 G/DL (ref 33–37)
MCV RBC AUTO: 82.8 FL (ref 80–94)
MCV RBC AUTO: 83.3 FL (ref 80–94)
MCV RBC AUTO: 83.6 FL (ref 80–94)
PDW BLD-RTO: 14.9 % (ref 11.5–14.5)
PDW BLD-RTO: 14.9 % (ref 11.5–14.5)
PDW BLD-RTO: 15.5 % (ref 11.5–14.5)
PERFORMED ON: ABNORMAL
PLATELET # BLD: 170 K/UL (ref 130–400)
PLATELET # BLD: 170 K/UL (ref 130–400)
PLATELET # BLD: 178 K/UL (ref 130–400)
PMV BLD AUTO: 10.4 FL (ref 9.4–12.4)
PMV BLD AUTO: 9.6 FL (ref 9.4–12.4)
PMV BLD AUTO: 9.9 FL (ref 9.4–12.4)
POTASSIUM SERPL-SCNC: 4.6 MMOL/L (ref 3.5–5)
PROTHROMBIN TIME: 28.4 SEC (ref 12–14.6)
RBC # BLD: 2.4 M/UL (ref 4.7–6.1)
RBC # BLD: 2.9 M/UL (ref 4.7–6.1)
RBC # BLD: 2.92 M/UL (ref 4.7–6.1)
SODIUM BLD-SCNC: 133 MMOL/L (ref 136–145)
TOTAL PROTEIN: 5.3 G/DL (ref 6.6–8.7)
VANCOMYCIN TROUGH: 20.2 UG/ML (ref 10–20)
WBC # BLD: 5.1 K/UL (ref 4.8–10.8)
WBC # BLD: 5.7 K/UL (ref 4.8–10.8)
WBC # BLD: 6.4 K/UL (ref 4.8–10.8)

## 2017-11-29 PROCEDURE — 1210000000 HC MED SURG R&B

## 2017-11-29 PROCEDURE — 99233 SBSQ HOSP IP/OBS HIGH 50: CPT | Performed by: HOSPITALIST

## 2017-11-29 PROCEDURE — 6360000002 HC RX W HCPCS: Performed by: SURGERY

## 2017-11-29 PROCEDURE — 36415 COLL VENOUS BLD VENIPUNCTURE: CPT

## 2017-11-29 PROCEDURE — P9016 RBC LEUKOCYTES REDUCED: HCPCS

## 2017-11-29 PROCEDURE — 85610 PROTHROMBIN TIME: CPT

## 2017-11-29 PROCEDURE — 2580000003 HC RX 258: Performed by: INTERNAL MEDICINE

## 2017-11-29 PROCEDURE — 2580000003 HC RX 258: Performed by: SURGERY

## 2017-11-29 PROCEDURE — 6370000000 HC RX 637 (ALT 250 FOR IP): Performed by: INTERNAL MEDICINE

## 2017-11-29 PROCEDURE — 6370000000 HC RX 637 (ALT 250 FOR IP): Performed by: NURSE PRACTITIONER

## 2017-11-29 PROCEDURE — 2700000000 HC OXYGEN THERAPY PER DAY

## 2017-11-29 PROCEDURE — 2580000003 HC RX 258: Performed by: HOSPITALIST

## 2017-11-29 PROCEDURE — 36430 TRANSFUSION BLD/BLD COMPNT: CPT

## 2017-11-29 PROCEDURE — 80202 ASSAY OF VANCOMYCIN: CPT

## 2017-11-29 PROCEDURE — 6370000000 HC RX 637 (ALT 250 FOR IP): Performed by: SURGERY

## 2017-11-29 PROCEDURE — 85027 COMPLETE CBC AUTOMATED: CPT

## 2017-11-29 PROCEDURE — 80053 COMPREHEN METABOLIC PANEL: CPT

## 2017-11-29 PROCEDURE — 82948 REAGENT STRIP/BLOOD GLUCOSE: CPT

## 2017-11-29 PROCEDURE — 99024 POSTOP FOLLOW-UP VISIT: CPT | Performed by: SURGERY

## 2017-11-29 PROCEDURE — 99232 SBSQ HOSP IP/OBS MODERATE 35: CPT | Performed by: SURGERY

## 2017-11-29 RX ORDER — INSULIN GLARGINE 100 [IU]/ML
47 INJECTION, SOLUTION SUBCUTANEOUS NIGHTLY
Status: DISCONTINUED | OUTPATIENT
Start: 2017-11-29 | End: 2017-12-06 | Stop reason: HOSPADM

## 2017-11-29 RX ORDER — 0.9 % SODIUM CHLORIDE 0.9 %
250 INTRAVENOUS SOLUTION INTRAVENOUS ONCE
Status: COMPLETED | OUTPATIENT
Start: 2017-11-29 | End: 2017-11-29

## 2017-11-29 RX ADMIN — MEROPENEM 1 G: 1 INJECTION, POWDER, FOR SOLUTION INTRAVENOUS at 06:07

## 2017-11-29 RX ADMIN — HYDRALAZINE HYDROCHLORIDE 10 MG: 10 TABLET, FILM COATED ORAL at 23:00

## 2017-11-29 RX ADMIN — DAKIN'S SOLUTION 0.125% (QUARTER STRENGTH): 0.12 SOLUTION at 08:20

## 2017-11-29 RX ADMIN — LACTULOSE 20 G: 10 SOLUTION ORAL at 21:21

## 2017-11-29 RX ADMIN — ASPIRIN 81 MG CHEWABLE TABLET 81 MG: 81 TABLET CHEWABLE at 08:18

## 2017-11-29 RX ADMIN — KETOROLAC TROMETHAMINE 15 MG: 30 INJECTION, SOLUTION INTRAMUSCULAR at 02:43

## 2017-11-29 RX ADMIN — GUAIFENESIN 1200 MG: 600 TABLET, EXTENDED RELEASE ORAL at 08:18

## 2017-11-29 RX ADMIN — INSULIN LISPRO 9 UNITS: 100 INJECTION, SOLUTION INTRAVENOUS; SUBCUTANEOUS at 17:53

## 2017-11-29 RX ADMIN — MEROPENEM 1 G: 1 INJECTION, POWDER, FOR SOLUTION INTRAVENOUS at 13:49

## 2017-11-29 RX ADMIN — Medication 10 ML: at 08:20

## 2017-11-29 RX ADMIN — GABAPENTIN 300 MG: 300 CAPSULE ORAL at 21:22

## 2017-11-29 RX ADMIN — MORPHINE SULFATE 2 MG: 4 INJECTION, SOLUTION INTRAMUSCULAR; INTRAVENOUS at 21:20

## 2017-11-29 RX ADMIN — LATANOPROST 1 DROP: 50 SOLUTION OPHTHALMIC at 21:23

## 2017-11-29 RX ADMIN — GABAPENTIN 300 MG: 300 CAPSULE ORAL at 08:18

## 2017-11-29 RX ADMIN — GUAIFENESIN 1200 MG: 600 TABLET, EXTENDED RELEASE ORAL at 21:22

## 2017-11-29 RX ADMIN — ATORVASTATIN CALCIUM 40 MG: 40 TABLET, FILM COATED ORAL at 21:22

## 2017-11-29 RX ADMIN — HYDROCODONE BITARTRATE AND ACETAMINOPHEN 1 TABLET: 5; 325 TABLET ORAL at 18:31

## 2017-11-29 RX ADMIN — ONDANSETRON 4 MG: 2 INJECTION INTRAMUSCULAR; INTRAVENOUS at 15:52

## 2017-11-29 RX ADMIN — BUMETANIDE 1 MG: 1 TABLET ORAL at 08:18

## 2017-11-29 RX ADMIN — HYDROCODONE BITARTRATE AND ACETAMINOPHEN 2 TABLET: 5; 325 TABLET ORAL at 23:00

## 2017-11-29 RX ADMIN — TRAZODONE HYDROCHLORIDE 100 MG: 100 TABLET ORAL at 21:22

## 2017-11-29 RX ADMIN — ALLOPURINOL 100 MG: 100 TABLET ORAL at 08:18

## 2017-11-29 RX ADMIN — LEVOTHYROXINE SODIUM 75 MCG: 75 TABLET ORAL at 06:08

## 2017-11-29 RX ADMIN — TAMSULOSIN HYDROCHLORIDE 0.4 MG: 0.4 CAPSULE ORAL at 08:19

## 2017-11-29 RX ADMIN — SODIUM CHLORIDE 250 ML: 9 INJECTION, SOLUTION INTRAVENOUS at 08:21

## 2017-11-29 RX ADMIN — ALLOPURINOL 100 MG: 100 TABLET ORAL at 21:22

## 2017-11-29 RX ADMIN — METOLAZONE 2.5 MG: 2.5 TABLET ORAL at 08:19

## 2017-11-29 RX ADMIN — INSULIN GLARGINE 47 UNITS: 100 INJECTION, SOLUTION SUBCUTANEOUS at 21:22

## 2017-11-29 RX ADMIN — SPIRONOLACTONE 50 MG: 50 TABLET ORAL at 08:18

## 2017-11-29 RX ADMIN — INSULIN LISPRO 12 UNITS: 100 INJECTION, SOLUTION INTRAVENOUS; SUBCUTANEOUS at 08:20

## 2017-11-29 RX ADMIN — KETOROLAC TROMETHAMINE 15 MG: 30 INJECTION, SOLUTION INTRAMUSCULAR at 08:19

## 2017-11-29 RX ADMIN — INSULIN LISPRO 6 UNITS: 100 INJECTION, SOLUTION INTRAVENOUS; SUBCUTANEOUS at 13:50

## 2017-11-29 RX ADMIN — SODIUM CHLORIDE: 9 INJECTION, SOLUTION INTRAVENOUS at 02:43

## 2017-11-29 RX ADMIN — FLUOXETINE 20 MG: 20 CAPSULE ORAL at 21:22

## 2017-11-29 RX ADMIN — INSULIN LISPRO 6 UNITS: 100 INJECTION, SOLUTION INTRAVENOUS; SUBCUTANEOUS at 21:28

## 2017-11-29 RX ADMIN — HYDROCODONE BITARTRATE AND ACETAMINOPHEN 2 TABLET: 5; 325 TABLET ORAL at 12:43

## 2017-11-29 RX ADMIN — Medication 10 ML: at 21:50

## 2017-11-29 RX ADMIN — VANCOMYCIN HYDROCHLORIDE 1250 MG: 1 INJECTION, POWDER, LYOPHILIZED, FOR SOLUTION INTRAVENOUS at 02:43

## 2017-11-29 RX ADMIN — TIOTROPIUM BROMIDE 18 MCG: 18 CAPSULE ORAL; RESPIRATORY (INHALATION) at 08:21

## 2017-11-29 ASSESSMENT — PAIN SCALES - GENERAL
PAINLEVEL_OUTOF10: 7
PAINLEVEL_OUTOF10: 3
PAINLEVEL_OUTOF10: 3
PAINLEVEL_OUTOF10: 2
PAINLEVEL_OUTOF10: 1
PAINLEVEL_OUTOF10: 4
PAINLEVEL_OUTOF10: 7
PAINLEVEL_OUTOF10: 2

## 2017-11-29 NOTE — PROGRESS NOTES
Pharmacy Renal Dose Adjustment      Recent Labs      11/28/17   0243  11/29/17   0510   BUN  29*  47*       Recent Labs      11/28/17 0243  11/29/17   0510   CREATININE  1.1  2.0*       Estimated Creatinine Clearance: 37 mL/min (based on SCr of 2 mg/dL). Plan: Changed Merrem to 1 gm IV q12h due to patients renal function. Pharmacy will continue to follow and make adjustments as needed.     Electronically signed by Clive Escamilla, [address] [place] [address] on 11/29/2017 at 2:14 PM

## 2017-11-29 NOTE — PROGRESS NOTES
1,250 mg Intravenous Q18H    allopurinol  100 mg Oral BID    aspirin  81 mg Oral Daily    atorvastatin  40 mg Oral Nightly    bumetanide  1 mg Oral TID    FLUoxetine  20 mg Oral Nightly    gabapentin  300 mg Oral BID    guaiFENesin  1,200 mg Oral BID    hydrALAZINE  10 mg Oral TID    lactulose  20 g Oral BID    latanoprost  1 drop Both Eyes Nightly    levothyroxine  75 mcg Oral Daily    metolazone  2.5 mg Oral Daily    metoprolol succinate  25 mg Oral Daily    spironolactone  50 mg Oral Daily    tamsulosin  0.4 mg Oral Daily    tiotropium  18 mcg Inhalation Daily    traZODone  100 mg Oral Nightly    insulin lispro  0-18 Units Subcutaneous TID WC    insulin lispro  0-9 Units Subcutaneous Nightly    enoxaparin  1 mg/kg Subcutaneous BID     acetaminophen, HYDROcodone 5 mg - acetaminophen **OR** HYDROcodone 5 mg - acetaminophen, ondansetron, morphine, sodium chloride flush, magnesium hydroxide, potassium chloride **OR** potassium chloride **OR** potassium chloride, HYDROcodone 5 mg - acetaminophen, glucose, dextrose, glucagon (rDNA), dextrose  DIET CARDIAC; Carb Control: 5 carbs/meal (approximate 2000 kcals/day)     Lab and other Data:     Recent Labs      11/28/17   0243  11/28/17   1324  11/29/17   0510   WBC  6.7  9.3  5.1   HGB  9.2*  8.6*  5.9*   PLT  192  224  170     Recent Labs      11/27/17   1251  11/28/17   0243  11/29/17   0510   NA  139  137  133*   K  3.8  3.7  4.6   CL  93*  91*  91*   CO2  39*  38*  31*   BUN  27*  29*  47*   CREATININE  1.1  1.1  2.0*   GLUCOSE  213*  173*  352*     Recent Labs      11/27/17   1251  11/28/17   0243  11/29/17   0510   AST  31  32  20   ALT  29  30  21   BILITOT  <0.2  0.3  <0.2   ALKPHOS  164*  183*  122     INR:   Recent Labs      11/27/17   1252  11/28/17   0518  11/29/17   0510   INR  2.44*  2.56*  2.63*     RAD:   Right tib/fib 11/27/2017  1. . Soft tissue gas within the mid and distal calf posteriorly.  It is  difficult to ascertain whether this vascular    Electronically signed by Felicitas Figueroa DO on 11/30/2017 at 10:00 AM

## 2017-11-29 NOTE — PROGRESS NOTES
Vascular Surgery Rounding Note    11/29/2017  6:14 AM  Antibiotic day 3, Antibiotic Type vancomycin  Antibiotic day 3, Antibiotic Type meropenem  Post Hosp day -   Post op day 1- open BKA  Subjective-In good spirits, feels much better this am.  Objective-   Invalid input(s): 24H    Intake/Output Summary (Last 24 hours) at 11/29/17 0614  Last data filed at 11/28/17 2133   Gross per 24 hour   Intake             1174 ml   Output              650 ml   Net              524 ml     No intake/output data recorded. CBC:   Recent Labs      11/27/17   1251  11/28/17   0243  11/28/17   1324   WBC  6.0  6.7  9.3   RBC  3.64*  3.67*  3.42*   HGB  8.9*  9.2*  8.6*   HCT  30.1*  30.5*  28.5*   MCV  82.7  83.1  83.3   MCH  24.5*  25.1*  25.1*   MCHC  29.6*  30.2*  30.2*   RDW  15.8*  15.8*  15.9*   PLT  182  192  224   MPV  9.8  9.4  10.0      Last 3 CMP:   Recent Labs      11/27/17   1251  11/28/17   0243   NA  139  137   K  3.8  3.7   CL  93*  91*   CO2  39*  38*   BUN  27*  29*   CREATININE  1.1  1.1   GLUCOSE  213*  173*   CALCIUM  8.6*  8.3*   PROT  6.4*  5.8*   LABALBU  3.1*  3.1*   BILITOT  <0.2  0.3   ALKPHOS  164*  183*   AST  31  32   ALT  29  30      Physical Exam:  Awake, alert, appropriate Yes  BP (!) 97/50   Pulse 76   Temp 97.1 °F (36.2 °C) (Temporal)   Resp 18   Ht 5' 11\" (1.803 m)   Wt 184 lb (83.5 kg)   SpO2 98%   BMI 25.66 kg/m²   Heart-Normal S1 and S2.  Regular rhythm. No murmurs, gallops, or rubs. Lung-negative  Neck-suppleno adenopathy, no carotid bruit, no JVD, supple, symmetrical, trachea midline and thyroid not enlarged, symmetric, no tenderness/mass/nodules  Abdomen-Abdomen soft, non-tender. BS normal. No masses,  No organomegaly  Extremities-wound examined, no arterial type bleeding, some ooze  Neurologic- alert, oriented, normal speech, no focal findings or movement disorder noted    Assessment/Plan  1. 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 Hgb 5.9, will transfuse  3.  Oozing -re dress with

## 2017-11-29 NOTE — PLAN OF CARE
Problem: Nutrition  Goal: Optimal nutrition therapy  Nutrition Problem: Increased nutrient needs  Intervention: Food and/or Nutrient Delivery: Modify current diet  Nutritional Goals: meet nutritional needs through po intake or JUDY    Outcome: Ongoing

## 2017-11-29 NOTE — PROGRESS NOTES
Dressing removed per Dr. Kellen Gasca.  Large amount of bloody drainage. Charge nurse assisted re-dressing. Quick clot applied, fluff, and ACE wrap. Patient tolerated well.

## 2017-11-29 NOTE — OP NOTE
soupy, foul-smelling, pinkish, thick  purulence with a great deal of necrotic muscle, gastrocnemius and soleus as  well as necrotic tendons. I extended my incision slightly inferior, found  the infection tracked down into his foot. At this point, I found out _____ the patient's wife and had our circulating  nurse go speak with her to let her know that I thought an amputation was in  the best interest of the patient. We had discussed this with the patient  this morning and he was agreeable to that. At this point, about 7 to 10 cm below the tibial tuberosity by my  exploration was where the infective necrotic process ends. Therefore, I  made a circular incision at this level and using sharp and Bovie  dissection, carried this down through the soft tissues. Arterial arteries  were snapped and divided and simply tied with 2-0 silk. We then transected  the tibia and fibula and removed the lower leg from the field. I examined  the muscle as well as all the cut edges at this level, there did not appear  to be any necrosis going on or tracking up the fascia of infection. I  irrigated with copious amounts of saline and very diligently secured  hemostasis throughout the wound with Bovie anticoagulation as well as  suture ligatures of silk and Vicryl. At this point, I placed some thrombin on the wound to help with any oozing  and this was followed by Kerlix gauze and an Ace wrap. The patient was taken to the post anesthesia recovery room in stable  condition. The estimated blood loss was 250 mL. Blood replaced was none. Needle count, sponge counts were correct at the end of the case. There  were no intraoperative complications. The findings at the time of the  surgery were extensive necrotizing infection tracking up the posterior and  posterolateral compartments to approximately the upper third of the calf  with clean tissue with no signs of infection at the level of amputation.    Specimen sent was the leg to pathology. PLAN:  The plan is to perform a revision either to at a below-the-knee  amputation or above-the-knee amputation and 48 to 72 hours after dressing  changes.           Jackson Fernández MD    D: 11/28/2017 14:04:10       T: 11/28/2017 14:07:57     [email or web address]  Job#: [id number]     Doc#: [id number]    CC:

## 2017-11-30 LAB
ALBUMIN SERPL-MCNC: 2.5 G/DL (ref 3.5–5.2)
ALP BLD-CCNC: 117 U/L (ref 40–130)
ALT SERPL-CCNC: 18 U/L (ref 5–41)
ANION GAP SERPL CALCULATED.3IONS-SCNC: 9 MMOL/L (ref 7–19)
AST SERPL-CCNC: 17 U/L (ref 5–40)
BILIRUB SERPL-MCNC: 0.3 MG/DL (ref 0.2–1.2)
BUN BLDV-MCNC: 43 MG/DL (ref 8–23)
CALCIUM SERPL-MCNC: 8 MG/DL (ref 8.8–10.2)
CHLORIDE BLD-SCNC: 96 MMOL/L (ref 98–111)
CO2: 32 MMOL/L (ref 22–29)
CREAT SERPL-MCNC: 1.4 MG/DL (ref 0.5–1.2)
GFR NON-AFRICAN AMERICAN: 50
GLUCOSE BLD-MCNC: 118 MG/DL (ref 74–109)
GLUCOSE BLD-MCNC: 130 MG/DL (ref 70–99)
GLUCOSE BLD-MCNC: 142 MG/DL (ref 70–99)
GLUCOSE BLD-MCNC: 169 MG/DL (ref 70–99)
GLUCOSE BLD-MCNC: 305 MG/DL (ref 70–99)
HCT VFR BLD CALC: 23.4 % (ref 42–52)
HCT VFR BLD CALC: 24.9 % (ref 42–52)
HEMOGLOBIN: 7.3 G/DL (ref 14–18)
HEMOGLOBIN: 7.8 G/DL (ref 14–18)
INR BLD: 2.18 (ref 0.88–1.18)
MCH RBC QN AUTO: 26.2 PG (ref 27–31)
MCH RBC QN AUTO: 26.6 PG (ref 27–31)
MCHC RBC AUTO-ENTMCNC: 31.2 G/DL (ref 33–37)
MCHC RBC AUTO-ENTMCNC: 31.3 G/DL (ref 33–37)
MCV RBC AUTO: 83.9 FL (ref 80–94)
MCV RBC AUTO: 85 FL (ref 80–94)
PDW BLD-RTO: 15.2 % (ref 11.5–14.5)
PDW BLD-RTO: 15.6 % (ref 11.5–14.5)
PERFORMED ON: ABNORMAL
PLATELET # BLD: 189 K/UL (ref 130–400)
PLATELET # BLD: 220 K/UL (ref 130–400)
PMV BLD AUTO: 9.7 FL (ref 9.4–12.4)
PMV BLD AUTO: 9.9 FL (ref 9.4–12.4)
POTASSIUM SERPL-SCNC: 4.3 MMOL/L (ref 3.5–5)
PROTHROMBIN TIME: 24.4 SEC (ref 12–14.6)
RBC # BLD: 2.79 M/UL (ref 4.7–6.1)
RBC # BLD: 2.93 M/UL (ref 4.7–6.1)
SODIUM BLD-SCNC: 137 MMOL/L (ref 136–145)
TOTAL PROTEIN: 5.3 G/DL (ref 6.6–8.7)
VANCOMYCIN RANDOM: 15.4 UG/ML
WBC # BLD: 4.9 K/UL (ref 4.8–10.8)
WBC # BLD: 6.7 K/UL (ref 4.8–10.8)

## 2017-11-30 PROCEDURE — 6370000000 HC RX 637 (ALT 250 FOR IP): Performed by: NURSE PRACTITIONER

## 2017-11-30 PROCEDURE — 1210000000 HC MED SURG R&B

## 2017-11-30 PROCEDURE — 99024 POSTOP FOLLOW-UP VISIT: CPT | Performed by: SURGERY

## 2017-11-30 PROCEDURE — 2580000003 HC RX 258: Performed by: PHYSICIAN ASSISTANT

## 2017-11-30 PROCEDURE — 85027 COMPLETE CBC AUTOMATED: CPT

## 2017-11-30 PROCEDURE — 2700000000 HC OXYGEN THERAPY PER DAY

## 2017-11-30 PROCEDURE — 80202 ASSAY OF VANCOMYCIN: CPT

## 2017-11-30 PROCEDURE — 85610 PROTHROMBIN TIME: CPT

## 2017-11-30 PROCEDURE — 6360000002 HC RX W HCPCS: Performed by: SURGERY

## 2017-11-30 PROCEDURE — 6370000000 HC RX 637 (ALT 250 FOR IP): Performed by: SURGERY

## 2017-11-30 PROCEDURE — 82948 REAGENT STRIP/BLOOD GLUCOSE: CPT

## 2017-11-30 PROCEDURE — 6360000002 HC RX W HCPCS: Performed by: NURSE PRACTITIONER

## 2017-11-30 PROCEDURE — 99232 SBSQ HOSP IP/OBS MODERATE 35: CPT | Performed by: INTERNAL MEDICINE

## 2017-11-30 PROCEDURE — 36415 COLL VENOUS BLD VENIPUNCTURE: CPT

## 2017-11-30 PROCEDURE — 2580000003 HC RX 258: Performed by: SURGERY

## 2017-11-30 PROCEDURE — 80053 COMPREHEN METABOLIC PANEL: CPT

## 2017-11-30 RX ORDER — CHLORHEXIDINE GLUCONATE 4 G/100ML
SOLUTION TOPICAL ONCE
Status: COMPLETED | OUTPATIENT
Start: 2017-11-30 | End: 2017-12-01

## 2017-11-30 RX ADMIN — GABAPENTIN 300 MG: 300 CAPSULE ORAL at 08:54

## 2017-11-30 RX ADMIN — LACTULOSE 20 G: 10 SOLUTION ORAL at 20:08

## 2017-11-30 RX ADMIN — HYDROCODONE BITARTRATE AND ACETAMINOPHEN 2 TABLET: 5; 325 TABLET ORAL at 09:08

## 2017-11-30 RX ADMIN — VANCOMYCIN HYDROCHLORIDE 1250 MG: 1 INJECTION, POWDER, LYOPHILIZED, FOR SOLUTION INTRAVENOUS at 08:56

## 2017-11-30 RX ADMIN — TIOTROPIUM BROMIDE 18 MCG: 18 CAPSULE ORAL; RESPIRATORY (INHALATION) at 08:57

## 2017-11-30 RX ADMIN — MEROPENEM 1 G: 1 INJECTION, POWDER, FOR SOLUTION INTRAVENOUS at 14:45

## 2017-11-30 RX ADMIN — GUAIFENESIN 1200 MG: 600 TABLET, EXTENDED RELEASE ORAL at 20:08

## 2017-11-30 RX ADMIN — MORPHINE SULFATE 2 MG: 4 INJECTION, SOLUTION INTRAMUSCULAR; INTRAVENOUS at 20:08

## 2017-11-30 RX ADMIN — HYDRALAZINE HYDROCHLORIDE 10 MG: 10 TABLET, FILM COATED ORAL at 20:09

## 2017-11-30 RX ADMIN — MORPHINE SULFATE 2 MG: 4 INJECTION, SOLUTION INTRAMUSCULAR; INTRAVENOUS at 01:26

## 2017-11-30 RX ADMIN — TAMSULOSIN HYDROCHLORIDE 0.4 MG: 0.4 CAPSULE ORAL at 09:00

## 2017-11-30 RX ADMIN — ENOXAPARIN SODIUM 80 MG: 80 INJECTION SUBCUTANEOUS at 20:08

## 2017-11-30 RX ADMIN — ALLOPURINOL 100 MG: 100 TABLET ORAL at 20:09

## 2017-11-30 RX ADMIN — GABAPENTIN 300 MG: 300 CAPSULE ORAL at 20:09

## 2017-11-30 RX ADMIN — ASPIRIN 81 MG CHEWABLE TABLET 81 MG: 81 TABLET CHEWABLE at 08:54

## 2017-11-30 RX ADMIN — HYDRALAZINE HYDROCHLORIDE 10 MG: 10 TABLET, FILM COATED ORAL at 14:24

## 2017-11-30 RX ADMIN — MEROPENEM 1 G: 1 INJECTION, POWDER, FOR SOLUTION INTRAVENOUS at 01:26

## 2017-11-30 RX ADMIN — FLUOXETINE 20 MG: 20 CAPSULE ORAL at 20:08

## 2017-11-30 RX ADMIN — GUAIFENESIN 1200 MG: 600 TABLET, EXTENDED RELEASE ORAL at 08:53

## 2017-11-30 RX ADMIN — LATANOPROST 1 DROP: 50 SOLUTION OPHTHALMIC at 20:09

## 2017-11-30 RX ADMIN — ATORVASTATIN CALCIUM 40 MG: 40 TABLET, FILM COATED ORAL at 20:08

## 2017-11-30 RX ADMIN — ENOXAPARIN SODIUM 80 MG: 80 INJECTION SUBCUTANEOUS at 09:00

## 2017-11-30 RX ADMIN — ACETAMINOPHEN 650 MG: 325 TABLET ORAL at 20:09

## 2017-11-30 RX ADMIN — ALLOPURINOL 100 MG: 100 TABLET ORAL at 08:54

## 2017-11-30 RX ADMIN — INSULIN LISPRO 12 UNITS: 100 INJECTION, SOLUTION INTRAVENOUS; SUBCUTANEOUS at 13:04

## 2017-11-30 RX ADMIN — HYDRALAZINE HYDROCHLORIDE 10 MG: 10 TABLET, FILM COATED ORAL at 08:54

## 2017-11-30 RX ADMIN — TRAZODONE HYDROCHLORIDE 100 MG: 100 TABLET ORAL at 20:08

## 2017-11-30 RX ADMIN — HYDROCODONE BITARTRATE AND ACETAMINOPHEN 2 TABLET: 5; 325 TABLET ORAL at 03:59

## 2017-11-30 RX ADMIN — INSULIN LISPRO 3 UNITS: 100 INJECTION, SOLUTION INTRAVENOUS; SUBCUTANEOUS at 17:06

## 2017-11-30 RX ADMIN — LEVOTHYROXINE SODIUM 75 MCG: 75 TABLET ORAL at 06:50

## 2017-11-30 RX ADMIN — METOPROLOL SUCCINATE 25 MG: 25 TABLET, EXTENDED RELEASE ORAL at 08:54

## 2017-11-30 RX ADMIN — SODIUM CHLORIDE: 9 INJECTION, SOLUTION INTRAVENOUS at 17:27

## 2017-11-30 RX ADMIN — Medication 10 ML: at 20:09

## 2017-11-30 ASSESSMENT — PAIN SCALES - GENERAL
PAINLEVEL_OUTOF10: 0
PAINLEVEL_OUTOF10: 8
PAINLEVEL_OUTOF10: 7
PAINLEVEL_OUTOF10: 5
PAINLEVEL_OUTOF10: 7

## 2017-11-30 ASSESSMENT — PAIN DESCRIPTION - LOCATION: LOCATION: OTHER (COMMENT)

## 2017-11-30 ASSESSMENT — PAIN DESCRIPTION - ORIENTATION: ORIENTATION: RIGHT

## 2017-11-30 NOTE — PROGRESS NOTES
noted    Assessment/Plan  1. POD-2 from [facility] amputaqtion  2. Hgb 7.3,   3. Revise amputation tomorrow, probable BKA, possibly AKA depending on findings  4. Went over this plan with patient and wife, risks discussed. Risks of surgery have been reviewed with the patient including but not limited to MI, death, CVA, bleeding, nerve injury, infection, need for further surgery, pain, and extended recovery time. Antibiotics continued after surgery due to:   Infection -  []  Abscess    []  Pneumonia    []  Aspiration Pneumonia    []  Sepsis    [x]  Cellulitis    []  Positive culture    []  UTI    []  Osteomyelitis    []  Fungal infection    []  H.pylori                          Right leg infection    DVT prophylaxis       No mechanical prophylaxis due to: wounds lower leg   []  bilateral amputee  []  bilateral lower extremity trauma        []  patient refusal        []  continuing IV heparin         No pharmacologic prophylaxis due to active bleeding        []  bleeding risk        []  GI bleed        []  hemorrhage        []  patient refusal        []  risk of bleeding         []  thrombocytopenia        [x]  supratherapeutic INR    Beta Blocker:  not indicated    Gibbs Catheter:  N/A

## 2017-11-30 NOTE — PROGRESS NOTES
Pharmacy Vancomycin Consult     Vancomycin Day: 3  Current Dosin mg IV every 18 hours    Temp max:  98.3    Recent Labs      17   0243  17   0510   BUN  29*  47*       Recent Labs      17   0243  17   0510   CREATININE  1.1  2.0*       Recent Labs      17   1436  17   1940   WBC  6.4  5.7         Intake/Output Summary (Last 24 hours) at 17 2150  Last data filed at 17 1912   Gross per 24 hour   Intake 1338 ml   Output 1025 ml   Net 313 ml       Culture Date Source Results   No cultures at this time         Ht Readings from Last 1 Encounters:   17 5' 11\" (1.803 m)        Wt Readings from Last 1 Encounters:   17 184 lb (83.5 kg)         Body mass index is 25.66 kg/m². Estimated Creatinine Clearance: 37 mL/min (based on SCr of 2 mg/dL). Trough: 20.2    Assessment/Plan: SCr has increased from 1.1 to 2.0 today. Will convert patient to pulse dosing until SCr returns to baseline. Random level ordered with AM labs.     Electronically signed by BRITTANI Martin [facility] on 2017 at 9:50 PM

## 2017-11-30 NOTE — PLAN OF CARE
Problem: Falls - Risk of:  Goal: Will remain free from falls  Will remain free from falls   Outcome: Ongoing  Patient  remained free from falls this shift. Fall precautions in place. Bed alarm in use. Will continue to monitor. Problem: Nutrition  Goal: Optimal nutrition therapy  Nutrition Problem: Increased nutrient needs  Intervention: Food and/or Nutrient Delivery: Modify current diet  Nutritional Goals: meet nutritional needs through po intake or JUDY     Outcome: Ongoing  Followed by dietician. Problem: Bleeding:  Goal: Will show no signs and symptoms of excessive bleeding  Will show no signs and symptoms of excessive bleeding  Outcome: Ongoing  No bleeding noted this shift. Will continue to monitor. Problem: Mood - Altered:  Goal: Mood stable  Mood stable  Outcome: Not Met This Shift  No signs of depression noted this shift. Problem: Serum Glucose Level - Abnormal:  Goal: Ability to maintain appropriate glucose levels has stabilized  Ability to maintain appropriate glucose levels has stabilized  Outcome: Not Met This Shift  Glucose not within normal range prior to lunch requiring sliding scale Humalog insulin coverage.

## 2017-11-30 NOTE — PROGRESS NOTES
[facility] Hospitalists      Patient:  Maryjane Hilliard  YOB: 1950  Date of Service: 11/30/2017  MRN: [id number]   Acct: [de-identified]   Primary Care Physician: Saundra Andre NP  Advance Directive: Full Code  Admit Date: 11/27/2017       Hospital Day: 3    CHIEF COMPLAINT: Right lower extremity pain    SUBJECTIVE: Mr. Laurel Mccauley complains of burning superior to his incision. States he finds it difficult to get comfortable. Cumulative Hospital Course:  Mr. Laurel Mccauley is a 69 y.o. male with PMHx DM, HTN, CAD, Cirrhosis, Mechanical MV, who was direct admit from wound care with right lower extremity open wound with achilles tendon visualized. Hospitalists have been consulted for medical management. He underwent a right open below knee amputation on 11/28/2017 with plans to undergo revision in next 24-48 hrs. Review of Systems:   Constitutional / general:  Denies fever / chills / sweats  Head:  Denies headache / neck stiffness / trauma / visual change  Eyes:  Denies blurry vision / acute visual change or loss / itching / redness  ENT: Denies sore throat / hoarseness / nasal drainage / ear pain  CV:  Denies chest pain / palpitations/ orthopnea   Respiratory:  Denies cough / shortness of breath / sputum / hemoptysis  GI: Denies nausea / vomiting / abdominal pain / diarrhea / constipation  :  Denies dysuria / hesitancy / urgency / hematuria   Neuro: Denies paralysis / syncope / seizure / dysphagia / headache / paresthesias  Musculoskeletal:  Denies muscle weakness /joint stiffness /+ pain  Vascular: Denies edema / claudication / varicosities  Heme / endocrine: Denies easy bruising / bleeding / excessive sweating / heat or cold intolerance  Psychiatric:  Denies depression / anxiety / insomnia / mood changes  Skin:  Denies new rashes / lesions / skin hair or nail changes    14 point review of systems is negative except as specifically addressed above.     Objective:   VITALS:  BP (!) 108/59   Pulse 83 Temp 97.7 °F (36.5 °C) (Temporal)   Resp 18   Ht 5' 11\" (1.803 m)   Wt 184 lb 15.5 oz (83.9 kg)   SpO2 95%   BMI 25.80 kg/m²   24HR INTAKE/OUTPUT:      Intake/Output Summary (Last 24 hours) at 11/30/17 1124  Last data filed at 11/30/17 0851   Gross per 24 hour   Intake             1875 ml   Output             2575 ml   Net             -700 ml     General appearance: alert, appears stated age, cooperative and no distress, laying on right side in bed  Head: Normocephalic, without obvious abnormality, atraumatic  Eyes: conjunctivae/corneas clear. PERRL, EOM's intact. Ears: normal external ears and nose, throat without exudate  Neck: no adenopathy, no carotid bruit, no JVD, supple, symmetrical, trachea midline and thyroid not enlarged, symmetric, no tenderness/mass/nodules  Lungs: Diminished throughout otherwise clear to auscultation bilaterally, diminished bilateral lower lobes,no rales or wheezes   Heart: Normal prosthetic heart tones, S1, S2, no chest wall tenderness  Abdomen:soft, non-tender; non-distended, normal bowel sounds no masses, no organomegaly  Extremities:s/p right BKA  Dressing in place, LLE without edema or tenderness to palpation  Skin: Skin color, texture, turgor normal. No rashes or lesions  Lymphatic: No palpable lymph node enlargment  Neurologic: Alert and oriented X 3, generalized weakness, normal tone. Normal symmetric reflexes.   Cranial nerves:II-XII Grossly intact Sensory: normal Motor:grossly normal  Psychiatric: Alert and oriented, thought content appropriate, normal insight, mood appropriate    Medications:      sodium chloride 75 mL/hr at 11/29/17 1345    dextrose        chlorhexidine   Topical Once    insulin glargine  47 Units Subcutaneous Nightly    meropenem  1 g Intravenous Q12H    sodium chloride flush  10 mL Intravenous 2 times per day    sodium hypochlorite   Irrigation BID    vancomycin (VANCOCIN) intermittent dosing (placeholder)   Other RX Placeholder    a gas-forming  organism. There is no radiographic evidence of acute osteomyelitis. CXR 11/27/2017  1. Cardiomegaly with prominent central vessels and small bilateral  pleural effusions. The findings are most likely due to congestive  failure. Correlate clinically. 2. Prior median sternotomy and heart valve placement. 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 Yulisa Thomas    Active Problems:    History of heart valve replacement with mechanical valve-continued on full dose Lovenox    Type 2 diabetes mellitus with foot ulcer, with long-term current use of insulin (HCC)-SSI, lantus    Acquired hypothyroidism-synthroid    Essential hypertension-has been hypotensive, monitor    Mixed hyperlipidemia-statin    Glaucoma-noted    Panlobular emphysema (HCC)-no exacerbation     Stage 3 chronic kidney disease-monitor, creatinine is now 1.4, Toradol and diuretics DC'd 11/29/2017    Cirrhosis of liver (HCC)-noted    Acute blood loss anemia-2 units PRBC    Hypotension-improving after transfusion, monitor    Antibiotic: Merrem, Vancomycin    DVT Prophylaxis: Lovenox    Dex Linda PA-C     ______________________________________________________________________  I have seen and evaluated the patient by myself. I agree with the plan and documentation per the APRN/PA. Please see addendum. Subjective:    No complaints currently. Ready to go to OR. No fevers or chills. No chest pain. No N/V. No SOB. Objective:   General:  NAD. Pleasant and interactive. Respiratory:  CTA without rhonchi or wheezes. Effort regular and unlabored. Cardiovascular:  RRR with expected murmur with mechanical valve replacement. No rubs or gallops. Normal S1/S2. Extremities:  No clubbing or cyanosis or edema. Right BKA with dressing intact. GI:  Abdomen soft, NT, ND.  +BS.   No guarding. Neuro:  CN II-XII intact. No focal motor deficits. Psych:  Alert and oriented times 3. Plan:  Continue full dose lovenox given mechanical valve. BKA per vascular surgery. Monitor BP and Hgb closely . Repeat labs in am.  See all orders. Further recommendations per clinical course.       Electronically signed by Solis Granda DO on 11/30/17 at 4:02 PM

## 2017-12-01 ENCOUNTER — ANESTHESIA EVENT (OUTPATIENT)
Dept: OPERATING ROOM | Age: 67
DRG: 240 | End: 2017-12-01
Payer: COMMERCIAL

## 2017-12-01 ENCOUNTER — ANESTHESIA (OUTPATIENT)
Dept: OPERATING ROOM | Age: 67
DRG: 240 | End: 2017-12-01
Payer: COMMERCIAL

## 2017-12-01 VITALS
TEMPERATURE: 98 F | OXYGEN SATURATION: 100 % | SYSTOLIC BLOOD PRESSURE: 104 MMHG | DIASTOLIC BLOOD PRESSURE: 40 MMHG | RESPIRATION RATE: 12 BRPM

## 2017-12-01 LAB
ALBUMIN SERPL-MCNC: 2.6 G/DL (ref 3.5–5.2)
ALP BLD-CCNC: 115 U/L (ref 40–130)
ALT SERPL-CCNC: 15 U/L (ref 5–41)
ANION GAP SERPL CALCULATED.3IONS-SCNC: 9 MMOL/L (ref 7–19)
AST SERPL-CCNC: 16 U/L (ref 5–40)
BILIRUB SERPL-MCNC: <0.2 MG/DL (ref 0.2–1.2)
BLOOD BANK DISPENSE STATUS: NORMAL
BLOOD BANK DISPENSE STATUS: NORMAL
BLOOD BANK PRODUCT CODE: NORMAL
BLOOD BANK PRODUCT CODE: NORMAL
BPU ID: NORMAL
BPU ID: NORMAL
BUN BLDV-MCNC: 30 MG/DL (ref 8–23)
C DIFFICILE TOXIN, EIA: NORMAL
CALCIUM SERPL-MCNC: 8 MG/DL (ref 8.8–10.2)
CHLORIDE BLD-SCNC: 97 MMOL/L (ref 98–111)
CO2: 31 MMOL/L (ref 22–29)
CREAT SERPL-MCNC: 1 MG/DL (ref 0.5–1.2)
DESCRIPTION BLOOD BANK: NORMAL
DESCRIPTION BLOOD BANK: NORMAL
GFR NON-AFRICAN AMERICAN: >60
GLUCOSE BLD-MCNC: 137 MG/DL (ref 74–109)
GLUCOSE BLD-MCNC: 154 MG/DL (ref 70–99)
GLUCOSE BLD-MCNC: 168 MG/DL (ref 70–99)
GLUCOSE BLD-MCNC: 291 MG/DL (ref 70–99)
HCT VFR BLD CALC: 23.9 % (ref 42–52)
HCT VFR BLD CALC: 29 % (ref 42–52)
HEMOGLOBIN: 7.3 G/DL (ref 14–18)
HEMOGLOBIN: 9 G/DL (ref 14–18)
INR BLD: 1.52 (ref 0.88–1.18)
MCH RBC QN AUTO: 26.4 PG (ref 27–31)
MCH RBC QN AUTO: 26.9 PG (ref 27–31)
MCHC RBC AUTO-ENTMCNC: 30.5 G/DL (ref 33–37)
MCHC RBC AUTO-ENTMCNC: 31 G/DL (ref 33–37)
MCV RBC AUTO: 86.3 FL (ref 80–94)
MCV RBC AUTO: 86.8 FL (ref 80–94)
PDW BLD-RTO: 15.3 % (ref 11.5–14.5)
PDW BLD-RTO: 15.7 % (ref 11.5–14.5)
PERFORMED ON: ABNORMAL
PLATELET # BLD: 213 K/UL (ref 130–400)
PLATELET # BLD: 216 K/UL (ref 130–400)
PMV BLD AUTO: 9.2 FL (ref 9.4–12.4)
PMV BLD AUTO: 9.8 FL (ref 9.4–12.4)
POTASSIUM SERPL-SCNC: 4.6 MMOL/L (ref 3.5–5)
PROTHROMBIN TIME: 18.3 SEC (ref 12–14.6)
RBC # BLD: 2.77 M/UL (ref 4.7–6.1)
RBC # BLD: 3.34 M/UL (ref 4.7–6.1)
SODIUM BLD-SCNC: 137 MMOL/L (ref 136–145)
TOTAL PROTEIN: 5.3 G/DL (ref 6.6–8.7)
VANCOMYCIN RANDOM: 14.3 UG/ML
WBC # BLD: 6.3 K/UL (ref 4.8–10.8)
WBC # BLD: 7.8 K/UL (ref 4.8–10.8)

## 2017-12-01 PROCEDURE — 87324 CLOSTRIDIUM AG IA: CPT

## 2017-12-01 PROCEDURE — 2580000003 HC RX 258: Performed by: SURGERY

## 2017-12-01 PROCEDURE — 6360000002 HC RX W HCPCS: Performed by: SURGERY

## 2017-12-01 PROCEDURE — 6360000002 HC RX W HCPCS: Performed by: NURSE PRACTITIONER

## 2017-12-01 PROCEDURE — 2580000003 HC RX 258: Performed by: PHYSICIAN ASSISTANT

## 2017-12-01 PROCEDURE — 6360000002 HC RX W HCPCS: Performed by: NURSE ANESTHETIST, CERTIFIED REGISTERED

## 2017-12-01 PROCEDURE — 7100000001 HC PACU RECOVERY - ADDTL 15 MIN: Performed by: SURGERY

## 2017-12-01 PROCEDURE — 2720000001 HC MISC SURG SUPPLY STERILE $51-500: Performed by: SURGERY

## 2017-12-01 PROCEDURE — 85027 COMPLETE CBC AUTOMATED: CPT

## 2017-12-01 PROCEDURE — 80053 COMPREHEN METABOLIC PANEL: CPT

## 2017-12-01 PROCEDURE — 6370000000 HC RX 637 (ALT 250 FOR IP): Performed by: NURSE PRACTITIONER

## 2017-12-01 PROCEDURE — 80202 ASSAY OF VANCOMYCIN: CPT

## 2017-12-01 PROCEDURE — 36415 COLL VENOUS BLD VENIPUNCTURE: CPT

## 2017-12-01 PROCEDURE — 85610 PROTHROMBIN TIME: CPT

## 2017-12-01 PROCEDURE — 3700000000 HC ANESTHESIA ATTENDED CARE: Performed by: SURGERY

## 2017-12-01 PROCEDURE — 3600000014 HC SURGERY LEVEL 4 ADDTL 15MIN: Performed by: SURGERY

## 2017-12-01 PROCEDURE — 0Y6H0Z2 DETACHMENT AT RIGHT LOWER LEG, MID, OPEN APPROACH: ICD-10-PCS | Performed by: SURGERY

## 2017-12-01 PROCEDURE — P9016 RBC LEUKOCYTES REDUCED: HCPCS

## 2017-12-01 PROCEDURE — 2700000000 HC OXYGEN THERAPY PER DAY

## 2017-12-01 PROCEDURE — 3700000001 HC ADD 15 MINUTES (ANESTHESIA): Performed by: SURGERY

## 2017-12-01 PROCEDURE — 99232 SBSQ HOSP IP/OBS MODERATE 35: CPT | Performed by: PHYSICIAN ASSISTANT

## 2017-12-01 PROCEDURE — 2720000000 HC MISC SURG SUPPLY STERILE $0-50: Performed by: SURGERY

## 2017-12-01 PROCEDURE — A6223 GAUZE >16<=48 NO W/SAL W/O B: HCPCS | Performed by: SURGERY

## 2017-12-01 PROCEDURE — 27880 AMPUTATION OF LOWER LEG: CPT | Performed by: SURGERY

## 2017-12-01 PROCEDURE — 82948 REAGENT STRIP/BLOOD GLUCOSE: CPT

## 2017-12-01 PROCEDURE — 3600000004 HC SURGERY LEVEL 4 BASE: Performed by: SURGERY

## 2017-12-01 PROCEDURE — 1210000000 HC MED SURG R&B

## 2017-12-01 PROCEDURE — 7100000000 HC PACU RECOVERY - FIRST 15 MIN: Performed by: SURGERY

## 2017-12-01 PROCEDURE — 6370000000 HC RX 637 (ALT 250 FOR IP): Performed by: INTERNAL MEDICINE

## 2017-12-01 PROCEDURE — 6370000000 HC RX 637 (ALT 250 FOR IP): Performed by: SURGERY

## 2017-12-01 PROCEDURE — 2500000003 HC RX 250 WO HCPCS: Performed by: NURSE ANESTHETIST, CERTIFIED REGISTERED

## 2017-12-01 RX ORDER — DIPHENHYDRAMINE HYDROCHLORIDE 50 MG/ML
12.5 INJECTION INTRAMUSCULAR; INTRAVENOUS
Status: DISCONTINUED | OUTPATIENT
Start: 2017-12-01 | End: 2017-12-01 | Stop reason: HOSPADM

## 2017-12-01 RX ORDER — GLYCOPYRROLATE 0.2 MG/ML
INJECTION INTRAMUSCULAR; INTRAVENOUS PRN
Status: DISCONTINUED | OUTPATIENT
Start: 2017-12-01 | End: 2017-12-01 | Stop reason: SDUPTHER

## 2017-12-01 RX ORDER — MIDAZOLAM HYDROCHLORIDE 1 MG/ML
INJECTION INTRAMUSCULAR; INTRAVENOUS PRN
Status: DISCONTINUED | OUTPATIENT
Start: 2017-12-01 | End: 2017-12-01 | Stop reason: SDUPTHER

## 2017-12-01 RX ORDER — LIDOCAINE HYDROCHLORIDE 10 MG/ML
INJECTION, SOLUTION INFILTRATION; PERINEURAL PRN
Status: DISCONTINUED | OUTPATIENT
Start: 2017-12-01 | End: 2017-12-01 | Stop reason: SDUPTHER

## 2017-12-01 RX ORDER — SODIUM CHLORIDE, SODIUM LACTATE, POTASSIUM CHLORIDE, CALCIUM CHLORIDE 600; 310; 30; 20 MG/100ML; MG/100ML; MG/100ML; MG/100ML
INJECTION, SOLUTION INTRAVENOUS CONTINUOUS
Status: DISCONTINUED | OUTPATIENT
Start: 2017-12-01 | End: 2017-12-01

## 2017-12-01 RX ORDER — ROCURONIUM BROMIDE 10 MG/ML
INJECTION, SOLUTION INTRAVENOUS PRN
Status: DISCONTINUED | OUTPATIENT
Start: 2017-12-01 | End: 2017-12-01 | Stop reason: SDUPTHER

## 2017-12-01 RX ORDER — PROMETHAZINE HYDROCHLORIDE 25 MG/ML
6.25 INJECTION, SOLUTION INTRAMUSCULAR; INTRAVENOUS
Status: DISCONTINUED | OUTPATIENT
Start: 2017-12-01 | End: 2017-12-01 | Stop reason: HOSPADM

## 2017-12-01 RX ORDER — MEPERIDINE HYDROCHLORIDE 50 MG/ML
12.5 INJECTION INTRAMUSCULAR; INTRAVENOUS; SUBCUTANEOUS EVERY 5 MIN PRN
Status: DISCONTINUED | OUTPATIENT
Start: 2017-12-01 | End: 2017-12-01 | Stop reason: HOSPADM

## 2017-12-01 RX ORDER — VANCOMYCIN HYDROCHLORIDE 1 G/200ML
1000 INJECTION, SOLUTION INTRAVENOUS ONCE
Status: DISCONTINUED | OUTPATIENT
Start: 2017-12-01 | End: 2017-12-01 | Stop reason: ALTCHOICE

## 2017-12-01 RX ORDER — LIDOCAINE HYDROCHLORIDE 10 MG/ML
1 INJECTION, SOLUTION EPIDURAL; INFILTRATION; INTRACAUDAL; PERINEURAL
Status: DISCONTINUED | OUTPATIENT
Start: 2017-12-01 | End: 2017-12-01 | Stop reason: HOSPADM

## 2017-12-01 RX ORDER — 0.9 % SODIUM CHLORIDE 0.9 %
250 INTRAVENOUS SOLUTION INTRAVENOUS ONCE
Status: DISCONTINUED | OUTPATIENT
Start: 2017-12-01 | End: 2017-12-01

## 2017-12-01 RX ORDER — SODIUM CHLORIDE 9 MG/ML
INJECTION, SOLUTION INTRAVENOUS CONTINUOUS
Status: DISCONTINUED | OUTPATIENT
Start: 2017-12-01 | End: 2017-12-06 | Stop reason: HOSPADM

## 2017-12-01 RX ORDER — ACETAMINOPHEN 325 MG/1
650 TABLET ORAL EVERY 4 HOURS PRN
Status: DISCONTINUED | OUTPATIENT
Start: 2017-12-01 | End: 2017-12-06 | Stop reason: HOSPADM

## 2017-12-01 RX ORDER — METOCLOPRAMIDE HYDROCHLORIDE 5 MG/ML
10 INJECTION INTRAMUSCULAR; INTRAVENOUS
Status: DISCONTINUED | OUTPATIENT
Start: 2017-12-01 | End: 2017-12-01 | Stop reason: HOSPADM

## 2017-12-01 RX ORDER — FENTANYL CITRATE 50 UG/ML
50 INJECTION, SOLUTION INTRAMUSCULAR; INTRAVENOUS
Status: DISCONTINUED | OUTPATIENT
Start: 2017-12-01 | End: 2017-12-01 | Stop reason: HOSPADM

## 2017-12-01 RX ORDER — PROPOFOL 10 MG/ML
INJECTION, EMULSION INTRAVENOUS PRN
Status: DISCONTINUED | OUTPATIENT
Start: 2017-12-01 | End: 2017-12-01 | Stop reason: SDUPTHER

## 2017-12-01 RX ORDER — FENTANYL CITRATE 50 UG/ML
INJECTION, SOLUTION INTRAMUSCULAR; INTRAVENOUS PRN
Status: DISCONTINUED | OUTPATIENT
Start: 2017-12-01 | End: 2017-12-01 | Stop reason: SDUPTHER

## 2017-12-01 RX ORDER — SODIUM CHLORIDE 0.9 % (FLUSH) 0.9 %
10 SYRINGE (ML) INJECTION PRN
Status: DISCONTINUED | OUTPATIENT
Start: 2017-12-01 | End: 2017-12-01 | Stop reason: HOSPADM

## 2017-12-01 RX ORDER — HYDROCODONE BITARTRATE AND ACETAMINOPHEN 5; 325 MG/1; MG/1
2 TABLET ORAL EVERY 4 HOURS PRN
Status: DISCONTINUED | OUTPATIENT
Start: 2017-12-01 | End: 2017-12-06 | Stop reason: HOSPADM

## 2017-12-01 RX ORDER — MIDAZOLAM HYDROCHLORIDE 1 MG/ML
2 INJECTION INTRAMUSCULAR; INTRAVENOUS
Status: DISCONTINUED | OUTPATIENT
Start: 2017-12-01 | End: 2017-12-01 | Stop reason: HOSPADM

## 2017-12-01 RX ORDER — SODIUM CHLORIDE 9 MG/ML
INJECTION, SOLUTION INTRAVENOUS CONTINUOUS
Status: DISCONTINUED | OUTPATIENT
Start: 2017-12-01 | End: 2017-12-01

## 2017-12-01 RX ORDER — LABETALOL HYDROCHLORIDE 5 MG/ML
5 INJECTION, SOLUTION INTRAVENOUS EVERY 10 MIN PRN
Status: DISCONTINUED | OUTPATIENT
Start: 2017-12-01 | End: 2017-12-01 | Stop reason: HOSPADM

## 2017-12-01 RX ORDER — MORPHINE SULFATE 10 MG/ML
INJECTION, SOLUTION INTRAMUSCULAR; INTRAVENOUS PRN
Status: DISCONTINUED | OUTPATIENT
Start: 2017-12-01 | End: 2017-12-01 | Stop reason: SDUPTHER

## 2017-12-01 RX ORDER — HYDRALAZINE HYDROCHLORIDE 20 MG/ML
5 INJECTION INTRAMUSCULAR; INTRAVENOUS EVERY 10 MIN PRN
Status: DISCONTINUED | OUTPATIENT
Start: 2017-12-01 | End: 2017-12-01 | Stop reason: HOSPADM

## 2017-12-01 RX ORDER — MORPHINE SULFATE 4 MG/ML
4 INJECTION, SOLUTION INTRAMUSCULAR; INTRAVENOUS EVERY 5 MIN PRN
Status: DISCONTINUED | OUTPATIENT
Start: 2017-12-01 | End: 2017-12-01 | Stop reason: HOSPADM

## 2017-12-01 RX ORDER — ONDANSETRON 2 MG/ML
INJECTION INTRAMUSCULAR; INTRAVENOUS PRN
Status: DISCONTINUED | OUTPATIENT
Start: 2017-12-01 | End: 2017-12-01 | Stop reason: SDUPTHER

## 2017-12-01 RX ORDER — ONDANSETRON 2 MG/ML
4 INJECTION INTRAMUSCULAR; INTRAVENOUS EVERY 6 HOURS PRN
Status: DISCONTINUED | OUTPATIENT
Start: 2017-12-01 | End: 2017-12-06 | Stop reason: HOSPADM

## 2017-12-01 RX ORDER — KETOROLAC TROMETHAMINE 30 MG/ML
15 INJECTION, SOLUTION INTRAMUSCULAR; INTRAVENOUS EVERY 6 HOURS
Status: DISCONTINUED | OUTPATIENT
Start: 2017-12-01 | End: 2017-12-03

## 2017-12-01 RX ORDER — MORPHINE SULFATE 4 MG/ML
2 INJECTION, SOLUTION INTRAMUSCULAR; INTRAVENOUS EVERY 5 MIN PRN
Status: DISCONTINUED | OUTPATIENT
Start: 2017-12-01 | End: 2017-12-01 | Stop reason: HOSPADM

## 2017-12-01 RX ORDER — HYDROCODONE BITARTRATE AND ACETAMINOPHEN 5; 325 MG/1; MG/1
1 TABLET ORAL EVERY 4 HOURS PRN
Status: DISCONTINUED | OUTPATIENT
Start: 2017-12-01 | End: 2017-12-06 | Stop reason: HOSPADM

## 2017-12-01 RX ORDER — DEXAMETHASONE SODIUM PHOSPHATE 4 MG/ML
INJECTION, SOLUTION INTRA-ARTICULAR; INTRALESIONAL; INTRAMUSCULAR; INTRAVENOUS; SOFT TISSUE PRN
Status: DISCONTINUED | OUTPATIENT
Start: 2017-12-01 | End: 2017-12-01 | Stop reason: SDUPTHER

## 2017-12-01 RX ORDER — SODIUM CHLORIDE 0.9 % (FLUSH) 0.9 %
10 SYRINGE (ML) INJECTION EVERY 12 HOURS SCHEDULED
Status: DISCONTINUED | OUTPATIENT
Start: 2017-12-01 | End: 2017-12-01 | Stop reason: HOSPADM

## 2017-12-01 RX ADMIN — LACTULOSE 20 G: 10 SOLUTION ORAL at 20:05

## 2017-12-01 RX ADMIN — TRAZODONE HYDROCHLORIDE 100 MG: 100 TABLET ORAL at 20:05

## 2017-12-01 RX ADMIN — ROCURONIUM BROMIDE 30 MG: 10 INJECTION INTRAVENOUS at 15:08

## 2017-12-01 RX ADMIN — GUAIFENESIN 1200 MG: 600 TABLET, EXTENDED RELEASE ORAL at 09:17

## 2017-12-01 RX ADMIN — LIDOCAINE HYDROCHLORIDE 5 ML: 10 INJECTION, SOLUTION INFILTRATION; PERINEURAL at 15:08

## 2017-12-01 RX ADMIN — GUAIFENESIN 1200 MG: 600 TABLET, EXTENDED RELEASE ORAL at 20:04

## 2017-12-01 RX ADMIN — HYDROCODONE BITARTRATE AND ACETAMINOPHEN 2 TABLET: 5; 325 TABLET ORAL at 20:04

## 2017-12-01 RX ADMIN — TIOTROPIUM BROMIDE 18 MCG: 18 CAPSULE ORAL; RESPIRATORY (INHALATION) at 09:18

## 2017-12-01 RX ADMIN — GABAPENTIN 300 MG: 300 CAPSULE ORAL at 09:17

## 2017-12-01 RX ADMIN — PHENYLEPHRINE HYDROCHLORIDE 80 MCG: 10 INJECTION INTRAVENOUS at 15:12

## 2017-12-01 RX ADMIN — FENTANYL CITRATE 25 MCG: 50 INJECTION, SOLUTION INTRAMUSCULAR; INTRAVENOUS at 15:32

## 2017-12-01 RX ADMIN — LACTULOSE 20 G: 10 SOLUTION ORAL at 09:17

## 2017-12-01 RX ADMIN — ONDANSETRON HYDROCHLORIDE 4 MG: 2 SOLUTION INTRAMUSCULAR; INTRAVENOUS at 16:45

## 2017-12-01 RX ADMIN — SODIUM CHLORIDE: 9 INJECTION, SOLUTION INTRAVENOUS at 19:09

## 2017-12-01 RX ADMIN — DEXAMETHASONE SODIUM PHOSPHATE 4 MG: 4 INJECTION, SOLUTION INTRAMUSCULAR; INTRAVENOUS at 15:23

## 2017-12-01 RX ADMIN — MEROPENEM 1 G: 1 INJECTION, POWDER, FOR SOLUTION INTRAVENOUS at 01:34

## 2017-12-01 RX ADMIN — HYDRALAZINE HYDROCHLORIDE 10 MG: 10 TABLET, FILM COATED ORAL at 09:17

## 2017-12-01 RX ADMIN — SODIUM CHLORIDE: 9 INJECTION, SOLUTION INTRAVENOUS at 09:11

## 2017-12-01 RX ADMIN — INSULIN LISPRO 3 UNITS: 100 INJECTION, SOLUTION INTRAVENOUS; SUBCUTANEOUS at 09:21

## 2017-12-01 RX ADMIN — ALLOPURINOL 100 MG: 100 TABLET ORAL at 09:17

## 2017-12-01 RX ADMIN — DAKIN'S SOLUTION 0.125% (QUARTER STRENGTH): 0.12 SOLUTION at 04:15

## 2017-12-01 RX ADMIN — Medication 10 ML: at 09:19

## 2017-12-01 RX ADMIN — METOPROLOL SUCCINATE 25 MG: 25 TABLET, EXTENDED RELEASE ORAL at 09:18

## 2017-12-01 RX ADMIN — FLUOXETINE 20 MG: 20 CAPSULE ORAL at 20:05

## 2017-12-01 RX ADMIN — PROPOFOL 130 MG: 10 INJECTION, EMULSION INTRAVENOUS at 15:08

## 2017-12-01 RX ADMIN — MORPHINE SULFATE 2 MG: 4 INJECTION, SOLUTION INTRAMUSCULAR; INTRAVENOUS at 05:43

## 2017-12-01 RX ADMIN — PHENYLEPHRINE HYDROCHLORIDE 80 MCG: 10 INJECTION INTRAVENOUS at 16:05

## 2017-12-01 RX ADMIN — HYDRALAZINE HYDROCHLORIDE 10 MG: 10 TABLET, FILM COATED ORAL at 20:05

## 2017-12-01 RX ADMIN — MEROPENEM 1 G: 1 INJECTION, POWDER, FOR SOLUTION INTRAVENOUS at 09:19

## 2017-12-01 RX ADMIN — ASPIRIN 81 MG CHEWABLE TABLET 81 MG: 81 TABLET CHEWABLE at 09:17

## 2017-12-01 RX ADMIN — TAMSULOSIN HYDROCHLORIDE 0.4 MG: 0.4 CAPSULE ORAL at 09:17

## 2017-12-01 RX ADMIN — ATORVASTATIN CALCIUM 40 MG: 40 TABLET, FILM COATED ORAL at 20:05

## 2017-12-01 RX ADMIN — FENTANYL CITRATE 25 MCG: 50 INJECTION, SOLUTION INTRAMUSCULAR; INTRAVENOUS at 15:41

## 2017-12-01 RX ADMIN — MORPHINE SULFATE 2 MG: 4 INJECTION, SOLUTION INTRAMUSCULAR; INTRAVENOUS at 21:22

## 2017-12-01 RX ADMIN — VANCOMYCIN HYDROCHLORIDE 1250 MG: 1 INJECTION, POWDER, LYOPHILIZED, FOR SOLUTION INTRAVENOUS at 05:43

## 2017-12-01 RX ADMIN — MORPHINE SULFATE 5 MG: 10 INJECTION INTRAMUSCULAR; INTRAVENOUS; SUBCUTANEOUS at 16:58

## 2017-12-01 RX ADMIN — VANCOMYCIN HYDROCHLORIDE 1250 MG: 1 INJECTION, POWDER, LYOPHILIZED, FOR SOLUTION INTRAVENOUS at 23:33

## 2017-12-01 RX ADMIN — ALLOPURINOL 100 MG: 100 TABLET ORAL at 20:05

## 2017-12-01 RX ADMIN — GABAPENTIN 300 MG: 300 CAPSULE ORAL at 20:05

## 2017-12-01 RX ADMIN — INSULIN LISPRO 3 UNITS: 100 INJECTION, SOLUTION INTRAVENOUS; SUBCUTANEOUS at 12:36

## 2017-12-01 RX ADMIN — LATANOPROST 1 DROP: 50 SOLUTION OPHTHALMIC at 20:08

## 2017-12-01 RX ADMIN — ENOXAPARIN SODIUM 80 MG: 80 INJECTION SUBCUTANEOUS at 09:16

## 2017-12-01 RX ADMIN — KETOROLAC TROMETHAMINE 15 MG: 30 INJECTION, SOLUTION INTRAMUSCULAR at 21:23

## 2017-12-01 RX ADMIN — FENTANYL CITRATE 50 MCG: 50 INJECTION, SOLUTION INTRAMUSCULAR; INTRAVENOUS at 15:11

## 2017-12-01 RX ADMIN — GLYCOPYRROLATE 0.4 MG: 0.2 INJECTION, SOLUTION INTRAMUSCULAR; INTRAVENOUS at 16:48

## 2017-12-01 RX ADMIN — FENTANYL CITRATE 100 MCG: 50 INJECTION, SOLUTION INTRAMUSCULAR; INTRAVENOUS at 15:07

## 2017-12-01 RX ADMIN — LEVOTHYROXINE SODIUM 75 MCG: 75 TABLET ORAL at 05:43

## 2017-12-01 RX ADMIN — PHENYLEPHRINE HYDROCHLORIDE 80 MCG: 10 INJECTION INTRAVENOUS at 15:25

## 2017-12-01 RX ADMIN — INSULIN GLARGINE 47 UNITS: 100 INJECTION, SOLUTION SUBCUTANEOUS at 23:12

## 2017-12-01 RX ADMIN — MIDAZOLAM HYDROCHLORIDE 2 MG: 1 INJECTION, SOLUTION INTRAMUSCULAR; INTRAVENOUS at 14:58

## 2017-12-01 RX ADMIN — MORPHINE SULFATE 5 MG: 10 INJECTION INTRAMUSCULAR; INTRAVENOUS; SUBCUTANEOUS at 16:54

## 2017-12-01 RX ADMIN — SODIUM CHLORIDE, SODIUM LACTATE, POTASSIUM CHLORIDE, AND CALCIUM CHLORIDE: 600; 310; 30; 20 INJECTION, SOLUTION INTRAVENOUS at 14:25

## 2017-12-01 RX ADMIN — CHLORHEXIDINE GLUCONATE: 213 SOLUTION TOPICAL at 04:15

## 2017-12-01 RX ADMIN — DAKIN'S SOLUTION 0.125% (QUARTER STRENGTH) 473 ML: 0.12 SOLUTION at 09:19

## 2017-12-01 RX ADMIN — NEOSTIGMINE METHYLSULFATE 3 MG: 1 INJECTION, SOLUTION INTRAMUSCULAR; INTRAVENOUS; SUBCUTANEOUS at 16:48

## 2017-12-01 RX ADMIN — INSULIN LISPRO 5 UNITS: 100 INJECTION, SOLUTION INTRAVENOUS; SUBCUTANEOUS at 23:13

## 2017-12-01 RX ADMIN — PHENYLEPHRINE HYDROCHLORIDE 80 MCG: 10 INJECTION INTRAVENOUS at 15:19

## 2017-12-01 ASSESSMENT — PAIN DESCRIPTION - PAIN TYPE
TYPE: ACUTE PAIN;SURGICAL PAIN
TYPE: ACUTE PAIN;SURGICAL PAIN

## 2017-12-01 ASSESSMENT — PAIN DESCRIPTION - LOCATION
LOCATION: LEG
LOCATION: LEG

## 2017-12-01 ASSESSMENT — LIFESTYLE VARIABLES: SMOKING_STATUS: 1

## 2017-12-01 ASSESSMENT — PAIN SCALES - GENERAL
PAINLEVEL_OUTOF10: 7
PAINLEVEL_OUTOF10: 8
PAINLEVEL_OUTOF10: 7

## 2017-12-01 NOTE — PROGRESS NOTES
Pharmacy Vancomycin Consult     Vancomycin Day: 5  Current Dosing: pulse dosing    Temp max:  101.5    Recent Labs      11/30/17   0453  12/01/17   0303   BUN  43*  30*       Recent Labs      11/30/17   0453  12/01/17   0303   CREATININE  1.4*  1.0       Recent Labs      11/30/17 2054  12/01/17   0303   WBC  6.7  6.3         Intake/Output Summary (Last 24 hours) at 12/01/17 0416  Last data filed at 11/30/17 1712   Gross per 24 hour   Intake 1821.2 ml   Output 425 ml   Net 1396.2 ml       Culture Date Source Results   No cultures at this time           Ht Readings from Last 1 Encounters:   11/27/17 5' 11\" (1.803 m)        Wt Readings from Last 1 Encounters:   12/01/17 184 lb 11.9 oz (83.8 kg)         Body mass index is 25.77 kg/m². Estimated Creatinine Clearance: 74 mL/min (based on SCr of 1 mg/dL). Random: 14.3    Assessment/Plan: SCr has returned to baseline. Will resume vancomycin 1250 mg IV every 18 hours. Trough level ordered.     Electronically signed by Pam Jose [facility] on 12/1/2017 at 4:16 AM

## 2017-12-01 NOTE — PROGRESS NOTES
[facility] Hospitalists      Patient:  Mark Severino  YOB: 1950  Date of Service: 12/1/2017  MRN: [id number]   Acct: [de-identified]   Primary Care Physician: Pavel Dang NP  Advance Directive: Full Code  Admit Date: 11/27/2017       Hospital Day: 4    CHIEF COMPLAINT: Right lower extremity pain    SUBJECTIVE: Mr. Thaina Cooley continues to complain of RLE pain. States it is unchanged. He is anxious to go down for his revision this afternoon. Cumulative Hospital Course:  Mr. Thania Cooley is a 69 y.o. male with PMHx DM, HTN, CAD, Cirrhosis, Mechanical MV, who was direct admit from wound care with right lower extremity open wound with achilles tendon visualized. Hospitalists have been consulted for medical management. He underwent a right open below knee amputation on 11/28/2017 with plans to undergo revision in next 24-48 hrs. Review of Systems:   Constitutional / general:  Denies fever / chills / sweats  Head:  Denies headache / neck stiffness / trauma / visual change  Eyes:  Denies blurry vision / acute visual change or loss / itching / redness  ENT: Denies sore throat / hoarseness / nasal drainage / ear pain  CV:  Denies chest pain / palpitations/ orthopnea   Respiratory:  Denies cough / shortness of breath / sputum / hemoptysis  GI: Denies nausea / vomiting / abdominal pain / + diarrhea / constipation  :  Denies dysuria / hesitancy / urgency / hematuria   Neuro: Denies paralysis / syncope / seizure / dysphagia / headache / paresthesias  Musculoskeletal:  Denies muscle weakness /joint stiffness /+ pain  Vascular: Denies edema / claudication / varicosities  Heme / endocrine: Denies easy bruising / bleeding / excessive sweating / heat or cold intolerance  Psychiatric:  Denies depression / anxiety / insomnia / mood changes  Skin:  Denies new rashes / lesions / skin hair or nail changes    14 point review of systems is negative except as specifically addressed above.     Objective:   VITALS:  BP (!) 106/53   Pulse 76   Temp 97.8 °F (36.6 °C) (Temporal)   Resp 16   Ht 5' 11\" (1.803 m)   Wt 184 lb 11.9 oz (83.8 kg)   SpO2 98%   BMI 25.77 kg/m²   24HR INTAKE/OUTPUT:      Intake/Output Summary (Last 24 hours) at 12/01/17 0935  Last data filed at 12/01/17 7367   Gross per 24 hour   Intake           1008.2 ml   Output              475 ml   Net            533.2 ml     General appearance: alert, appears stated age, cooperative and no distress, resting comfortably in bed  Head: Normocephalic, without obvious abnormality, atraumatic  Eyes: conjunctivae/corneas clear. PERRL, EOM's intact. Ears: normal external ears and nose, throat without exudate  Neck: no adenopathy, no carotid bruit, no JVD, supple, symmetrical, trachea midline and thyroid not enlarged, symmetric, no tenderness/mass/nodules  Lungs: Diminished throughout otherwise clear to auscultation bilaterally, diminished bilateral lower lobes,no rales or wheezes   Heart: Normal prosthetic heart tones, S1, S2, no chest wall tenderness  Abdomen:soft, non-tender; non-distended, normal bowel sounds no masses, no organomegaly  Extremities:s/p right BKA  Dressing in place, LLE without edema or tenderness to palpation  Skin: Skin color, texture, turgor normal. No rashes or lesions  Lymphatic: No palpable lymph node enlargment  Neurologic: Alert and oriented X 3, generalized weakness, normal tone. Normal symmetric reflexes.   Cranial nerves:II-XII Grossly intact Sensory: normal Motor:grossly normal  Psychiatric: Alert and oriented, thought content appropriate, normal insight, mood appropriate    Medications:      sodium chloride 75 mL/hr at 12/01/17 0911    dextrose        vancomycin  1,250 mg Intravenous Q18H    meropenem  1 g Intravenous Q8H    insulin glargine  47 Units Subcutaneous Nightly    sodium chloride flush  10 mL Intravenous 2 times per day    sodium hypochlorite   Irrigation BID    vancomycin (VANCOCIN) intermittent dosing (placeholder)   Other

## 2017-12-01 NOTE — ANESTHESIA PRE PROCEDURE
Department of Anesthesiology  Preprocedure Note       Name:  Ellie Caldwell   Age:  69 y.o.  :  1950                                          MRN:  [id number]         Date:  2017      Surgeon: Kellen Tavarez):  Radha Aguirre MD    Procedure: Procedure(s):  LEG DEBRIDEMENT VS BELOW KNEE AMPUTATION    Medications prior to admission:   Prior to Admission medications    Medication Sig Start Date End Date Taking?  Authorizing Provider   OXYGEN Inhale 2 L into the lungs continuous    Historical Provider, MD   albuterol sulfate  (90 Base) MCG/ACT inhaler Inhale 2 puffs into the lungs every 6 hours as needed for Wheezing    Historical Provider, MD   spironolactone (ALDACTONE) 50 MG tablet Take 50 mg by mouth daily    Historical Provider, MD   allopurinol (ZYLOPRIM) 100 MG tablet Take 100 mg by mouth 2 times daily    Historical Provider, MD   aspirin 81 MG tablet Take 81 mg by mouth daily    Historical Provider, MD   atorvastatin (LIPITOR) 40 MG tablet Take 40 mg by mouth nightly    Historical Provider, MD   bumetanide (BUMEX) 1 MG tablet Take 1 mg by mouth 3 times daily    Historical Provider, MD   clopidogrel (PLAVIX) 75 MG tablet Take 75 mg by mouth daily    Historical Provider, MD   warfarin (COUMADIN) 1 MG tablet Take 1 mg by mouth every other day    Historical Provider, MD   warfarin (COUMADIN) 6 MG tablet Take 6 mg by mouth    Historical Provider, MD   diphenhydrAMINE (BENADRYL) 25 MG capsule Take 25 mg by mouth nightly    Historical Provider, MD   fluticasone (FLONASE) 50 MCG/ACT nasal spray 2 sprays by Nasal route 2 times daily    Historical Provider, MD   FLUoxetine (PROZAC) 20 MG capsule Take 20 mg by mouth nightly    Historical Provider, MD   gabapentin (NEURONTIN) 300 MG capsule Take 300 mg by mouth 2 times daily    Historical Provider, MD   guaiFENesin (MUCINEX) 600 MG extended release tablet Take 1,200 mg by mouth 2 times daily    Historical Provider, MD   hydrALAZINE (APRESOLINE) 10 MG 0917    [MAR Hold] tiotropium (SPIRIVA) inhalation capsule 18 mcg  18 mcg Inhalation Daily [facility] APRN   18 mcg at 12/01/17 0918    [MAR Hold] traZODone (DESYREL) tablet 100 mg  100 mg [facility] Nightly [facility]   100 mg at 11/30/17 2008    [MAR Hold] glucose (GLUTOSE) 40 % oral gel 15 g  15 g Oral PRN [facility] Hold] dextrose 50 % solution 12.5 g  12.5 g Intravenous PRN [facility], APRN        [MAR Hold] glucagon (rDNA) injection 1 mg  1 mg Intramuscular PRN [facility] Hold] dextrose 5 % solution  100 mL/hr Intravenous PRN [facility] Hold] insulin lispro (HUMALOG) injection vial 0-18 Units  0-18 Units Subcutaneous TID WC [facility] APRN   3 Units at 12/01/17 1236    [MAR Hold] insulin lispro (HUMALOG) injection vial 0-9 Units  0-9 Units Subcutaneous Nightly [facility], APRN   Stopped at 11/30/17 2009    [MAR Hold] enoxaparin (LOVENOX) injection 80 mg  1 mg/kg Subcutaneous BID [facility]   80 mg at 12/01/17 6743       Allergies:     Allergies   Allergen Reactions    Prazosin        Problem List:    Patient Active Problem List   Diagnosis Code    History of heart valve replacement with mechanical valve Z95.2    Infection B99.9    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 ([facility] L97.313    Type 2 diabetes mellitus with foot ulcer, with long-term current use of insulin (HCC) E11.621, L97.509, Z79.4    Acquired hypothyroidism E03.9    Essential hypertension I10    Mixed hyperlipidemia E78.2    Glaucoma H40.9    Panlobular emphysema ([facility] J43.1    Stage 3 chronic kidney disease N18.3    Cirrhosis of liver ([facility] K74.60       Past Medical History:        Diagnosis Date    Acute kidney failure ([facility]     Benign prostatic hyperplasia without urinary obstruction     CAD (coronary artery disease)     HFpEF NHYA Class 3, Stage C    CHF (congestive heart failure) ([facility] history of    CHF (congestive heart failure) (HCC)     Chronic kidney disease (CKD)     Cirrhosis of liver (HCC)     Ascites/ followed by Addison Funk COPD (chronic obstructive pulmonary disease) ([facility]     Diabetes mellitus ([facility]     Type 2    Emphysema lung ([facility]     Glaucoma     Gout     History of obstructive sleep apnea     Hyperlipidemia     Hypertension     Hypothyroidism     Muscle weakness     Myocardial infarction 12/2014    s/p RCA stent 12/2014    Occlusion and stenosis of left carotid artery     Personal history of pulmonary embolism 10/2014    Psychiatric problem     depression       Past Surgical History:        Procedure Laterality Date    AORTIC VALVE REPLACEMENT  01/29/2015    23 mm mechanical valve, mitral valve annuloplasty repair w/30 mm Physio and tricuspid ring annuloplasty repair w/34 mm MC3 ring    CARDIAC [facility]HETERIZATION  [facility]/14  [facility]    EF60%    CARDIAC CATHETERIZATION  12/16/14  [facility]    right heart cath EF 60%    CORONARY ANGIOPLASTY WITH STENT PLACEMENT  12/18/14  [facility]    to RCA    LEG AMPUTATION BELOW KNEE Right 11/28/2017     RIGHT OPEN BELOW KNEE AMPUTATION performed by Gera Knutson MD at [facility] OR       Social History:    Social History   Substance Use Topics    Smoking status: Never Smoker    Smokeless tobacco: Never Used    Alcohol use No                                Counseling given: Not Answered      Vital Signs (Current): There were no vitals filed for this visit.                                            BP Readings from Last 3 Encounters:   12/01/17 110/60   11/28/17 (!) 109/44   11/22/17 119/60       NPO Status:                                                                                 BMI:   Wt Readings from Last 3 Encounters:   12/01/17 184 lb 11.9 oz (83.8 kg)   11/22/17 184 lb (83.5 kg)   11/13/17 179 lb (81.2 kg)     There is no height or weight on file to calculate BMI.    CBC:   Lab Results   Component Value Date    WBC 6.3 12/01/2017    RBC 2.77 12/01/2017    HGB 7.3 12/01/2017    HCT 23.9 12/01/2017    MCV 86.3 12/01/2017    RDW 15.7 12/01/2017     12/01/2017       CMP:   Lab Results   Component Value Date     12/01/2017    K 4.6 12/01/2017    CL 97 12/01/2017    CO2 31 12/01/2017    BUN 30 12/01/2017    CREATININE 1.0 12/01/2017    LABGLOM >60 12/01/2017    GLUCOSE 137 12/01/2017    PROT 5.3 12/01/2017    CALCIUM 8.0 12/01/2017    BILITOT <0.2 12/01/2017    ALKPHOS 115 12/01/2017    AST 16 12/01/2017    ALT 15 12/01/2017       POC Tests:   Recent Labs      12/01/17   1136   POCGLU  154*       Coags:   Lab Results   Component Value Date    PROTIME 18.3 12/01/2017    INR 1.52 12/01/2017    APTT 62.4 11/27/2017       HCG (If Applicable): No results found for: PREGTESTUR, PREGSERUM, HCG, HCGQUANT     ABGs: No results found for: PHART, PO2ART, HKZ0UQC, KCD1XQJ, BEART, B3IYQAUV     Type & Screen (If Applicable):  No results found for: LABABO, [address]    Anesthesia Evaluation  Patient summary reviewed and Nursing notes reviewed no history of anesthetic complications:   Airway: Mallampati: I  TM distance: >3 FB   Neck ROM: full   Dental:          Pulmonary:   (+) COPD:  current smoker          Patient did not smoke on day of surgery.                  Cardiovascular:    (+) hypertension:, valvular problems/murmurs:, CAD:,     (-) pacemaker and  angina       Beta Blocker:  Dose within 24 Hrs         Neuro/Psych:   (+) CVA (No residual):,             GI/Hepatic/Renal:   (+) liver disease:, renal disease: CRI,      (-) GERD       Endo/Other:    (+) Type II DM, , hypothyroidism, blood dyscrasia: anticoagulation therapy:., .                  ROS comment: Anemia Abdominal:           Vascular:                                          Anesthesia Plan      general     ASA 4     (Previous right BKA without complications on 17/46.  )  Induction: intravenous and inhalational.  BIS  MIPS: Postoperative opioids intended and Prophylactic

## 2017-12-01 NOTE — ANESTHESIA POSTPROCEDURE EVALUATION
Department of Anesthesiology  Postprocedure Note    Patient: Choco Doyle  MRN: [id number]  YOB: 1950  Date of evaluation: 12/1/2017  Time:  5:09 PM     Procedure Summary     Date:  12/01/17 Room / Location:  [facility] OR  / [facility] OR    Anesthesia Start:  1459 Anesthesia Stop:      Procedure:  RIGHT BELOW KNEE AMPUTATION (Right ) Diagnosis:  ( )    Surgeon:  Chelo Duggan MD Responsible Provider:  Angel Damon CRNA    Anesthesia Type:  general ASA Status:  4          Anesthesia Type: general    Alexandra Phase I: Alexandra Score: 8    Alexandra Phase II:      Last vitals: Reviewed and per EMR flowsheets.        Anesthesia Post Evaluation    Patient location during evaluation: PACU  Patient participation: complete - patient participated  Level of consciousness: sleepy but conscious  Pain score: 0  Airway patency: patent  Nausea & Vomiting: no nausea and no vomiting  Complications: no  Cardiovascular status: blood pressure returned to baseline and hemodynamically stable  Respiratory status: acceptable, nasal cannula and spontaneous ventilation  Hydration status: stable

## 2017-12-02 ENCOUNTER — APPOINTMENT (OUTPATIENT)
Dept: GENERAL RADIOLOGY | Age: 67
DRG: 240 | End: 2017-12-02
Attending: SURGERY
Payer: COMMERCIAL

## 2017-12-02 LAB
ALBUMIN SERPL-MCNC: 2.4 G/DL (ref 3.5–5.2)
ALP BLD-CCNC: 94 U/L (ref 40–130)
ALT SERPL-CCNC: 12 U/L (ref 5–41)
ANION GAP SERPL CALCULATED.3IONS-SCNC: 7 MMOL/L (ref 7–19)
AST SERPL-CCNC: 14 U/L (ref 5–40)
BILIRUB SERPL-MCNC: <0.2 MG/DL (ref 0.2–1.2)
BUN BLDV-MCNC: 33 MG/DL (ref 8–23)
CALCIUM SERPL-MCNC: 7.4 MG/DL (ref 8.8–10.2)
CHLORIDE BLD-SCNC: 95 MMOL/L (ref 98–111)
CO2: 30 MMOL/L (ref 22–29)
CREAT SERPL-MCNC: 1.1 MG/DL (ref 0.5–1.2)
GFR NON-AFRICAN AMERICAN: >60
GLUCOSE BLD-MCNC: 174 MG/DL (ref 70–99)
GLUCOSE BLD-MCNC: 243 MG/DL (ref 70–99)
GLUCOSE BLD-MCNC: 257 MG/DL (ref 70–99)
GLUCOSE BLD-MCNC: 295 MG/DL (ref 70–99)
GLUCOSE BLD-MCNC: 298 MG/DL (ref 70–99)
GLUCOSE BLD-MCNC: 303 MG/DL (ref 74–109)
HCT VFR BLD CALC: 23.7 % (ref 42–52)
HCT VFR BLD CALC: 24.8 % (ref 42–52)
HEMOGLOBIN: 7.4 G/DL (ref 14–18)
HEMOGLOBIN: 7.8 G/DL (ref 14–18)
INR BLD: 1.19 (ref 0.88–1.18)
MCH RBC QN AUTO: 26.8 PG (ref 27–31)
MCH RBC QN AUTO: 27.5 PG (ref 27–31)
MCHC RBC AUTO-ENTMCNC: 31.2 G/DL (ref 33–37)
MCHC RBC AUTO-ENTMCNC: 31.5 G/DL (ref 33–37)
MCV RBC AUTO: 85.9 FL (ref 80–94)
MCV RBC AUTO: 87.3 FL (ref 80–94)
PDW BLD-RTO: 15.4 % (ref 11.5–14.5)
PDW BLD-RTO: 15.6 % (ref 11.5–14.5)
PERFORMED ON: ABNORMAL
PLATELET # BLD: 195 K/UL (ref 130–400)
PLATELET # BLD: 221 K/UL (ref 130–400)
PMV BLD AUTO: 10 FL (ref 9.4–12.4)
PMV BLD AUTO: 9.5 FL (ref 9.4–12.4)
POTASSIUM SERPL-SCNC: 4.5 MMOL/L (ref 3.5–5)
POTASSIUM SERPL-SCNC: 5.5 MMOL/L (ref 3.5–5)
PROTHROMBIN TIME: 15.1 SEC (ref 12–14.6)
RBC # BLD: 2.76 M/UL (ref 4.7–6.1)
RBC # BLD: 2.84 M/UL (ref 4.7–6.1)
SODIUM BLD-SCNC: 132 MMOL/L (ref 136–145)
TOTAL PROTEIN: 5 G/DL (ref 6.6–8.7)
WBC # BLD: 11.6 K/UL (ref 4.8–10.8)
WBC # BLD: 7 K/UL (ref 4.8–10.8)

## 2017-12-02 PROCEDURE — 80053 COMPREHEN METABOLIC PANEL: CPT

## 2017-12-02 PROCEDURE — 86901 BLOOD TYPING SEROLOGIC RH(D): CPT

## 2017-12-02 PROCEDURE — 86900 BLOOD TYPING SEROLOGIC ABO: CPT

## 2017-12-02 PROCEDURE — 6370000000 HC RX 637 (ALT 250 FOR IP): Performed by: NURSE PRACTITIONER

## 2017-12-02 PROCEDURE — 82948 REAGENT STRIP/BLOOD GLUCOSE: CPT

## 2017-12-02 PROCEDURE — 6360000002 HC RX W HCPCS: Performed by: HOSPITALIST

## 2017-12-02 PROCEDURE — 86850 RBC ANTIBODY SCREEN: CPT

## 2017-12-02 PROCEDURE — 85027 COMPLETE CBC AUTOMATED: CPT

## 2017-12-02 PROCEDURE — 1210000000 HC MED SURG R&B

## 2017-12-02 PROCEDURE — 99024 POSTOP FOLLOW-UP VISIT: CPT | Performed by: SURGERY

## 2017-12-02 PROCEDURE — 2580000003 HC RX 258: Performed by: SURGERY

## 2017-12-02 PROCEDURE — 74000 XR ABDOMEN LIMITED (KUB): CPT

## 2017-12-02 PROCEDURE — 6370000000 HC RX 637 (ALT 250 FOR IP): Performed by: INTERNAL MEDICINE

## 2017-12-02 PROCEDURE — 84132 ASSAY OF SERUM POTASSIUM: CPT

## 2017-12-02 PROCEDURE — 6360000002 HC RX W HCPCS: Performed by: SURGERY

## 2017-12-02 PROCEDURE — 6370000000 HC RX 637 (ALT 250 FOR IP): Performed by: PHYSICIAN ASSISTANT

## 2017-12-02 PROCEDURE — 99232 SBSQ HOSP IP/OBS MODERATE 35: CPT | Performed by: INTERNAL MEDICINE

## 2017-12-02 PROCEDURE — 2700000000 HC OXYGEN THERAPY PER DAY

## 2017-12-02 PROCEDURE — 85610 PROTHROMBIN TIME: CPT

## 2017-12-02 PROCEDURE — 6370000000 HC RX 637 (ALT 250 FOR IP): Performed by: SURGERY

## 2017-12-02 PROCEDURE — 36415 COLL VENOUS BLD VENIPUNCTURE: CPT

## 2017-12-02 RX ORDER — LACTULOSE 10 G/15ML
20 SOLUTION ORAL DAILY
Status: DISCONTINUED | OUTPATIENT
Start: 2017-12-03 | End: 2017-12-06 | Stop reason: HOSPADM

## 2017-12-02 RX ORDER — SODIUM POLYSTYRENE SULFONATE 15 G/60ML
15 SUSPENSION ORAL; RECTAL ONCE
Status: COMPLETED | OUTPATIENT
Start: 2017-12-02 | End: 2017-12-02

## 2017-12-02 RX ADMIN — ACETAMINOPHEN 650 MG: 325 TABLET, FILM COATED ORAL at 21:10

## 2017-12-02 RX ADMIN — GUAIFENESIN 1200 MG: 600 TABLET, EXTENDED RELEASE ORAL at 08:07

## 2017-12-02 RX ADMIN — METOPROLOL SUCCINATE 25 MG: 25 TABLET, EXTENDED RELEASE ORAL at 08:07

## 2017-12-02 RX ADMIN — HYDROCODONE BITARTRATE AND ACETAMINOPHEN 2 TABLET: 5; 325 TABLET ORAL at 05:45

## 2017-12-02 RX ADMIN — INSULIN LISPRO 9 UNITS: 100 INJECTION, SOLUTION INTRAVENOUS; SUBCUTANEOUS at 08:03

## 2017-12-02 RX ADMIN — GUAIFENESIN 1200 MG: 600 TABLET, EXTENDED RELEASE ORAL at 21:09

## 2017-12-02 RX ADMIN — MEROPENEM 1 G: 1 INJECTION, POWDER, FOR SOLUTION INTRAVENOUS at 10:50

## 2017-12-02 RX ADMIN — FLUOXETINE 20 MG: 20 CAPSULE ORAL at 21:10

## 2017-12-02 RX ADMIN — MEROPENEM 1 G: 1 INJECTION, POWDER, FOR SOLUTION INTRAVENOUS at 01:35

## 2017-12-02 RX ADMIN — SODIUM CHLORIDE: 9 INJECTION, SOLUTION INTRAVENOUS at 03:23

## 2017-12-02 RX ADMIN — HYDRALAZINE HYDROCHLORIDE 10 MG: 10 TABLET, FILM COATED ORAL at 08:07

## 2017-12-02 RX ADMIN — LEVOTHYROXINE SODIUM 75 MCG: 75 TABLET ORAL at 05:45

## 2017-12-02 RX ADMIN — INSULIN LISPRO 6 UNITS: 100 INJECTION, SOLUTION INTRAVENOUS; SUBCUTANEOUS at 18:15

## 2017-12-02 RX ADMIN — KETOROLAC TROMETHAMINE 15 MG: 30 INJECTION, SOLUTION INTRAMUSCULAR at 21:09

## 2017-12-02 RX ADMIN — MORPHINE SULFATE 2 MG: 4 INJECTION, SOLUTION INTRAMUSCULAR; INTRAVENOUS at 18:20

## 2017-12-02 RX ADMIN — INSULIN LISPRO 9 UNITS: 100 INJECTION, SOLUTION INTRAVENOUS; SUBCUTANEOUS at 14:00

## 2017-12-02 RX ADMIN — HYDROCODONE BITARTRATE AND ACETAMINOPHEN 2 TABLET: 5; 325 TABLET ORAL at 01:38

## 2017-12-02 RX ADMIN — HYDROCODONE BITARTRATE AND ACETAMINOPHEN 2 TABLET: 5; 325 TABLET ORAL at 16:15

## 2017-12-02 RX ADMIN — KETOROLAC TROMETHAMINE 15 MG: 30 INJECTION, SOLUTION INTRAMUSCULAR at 03:23

## 2017-12-02 RX ADMIN — ASPIRIN 81 MG CHEWABLE TABLET 81 MG: 81 TABLET CHEWABLE at 08:07

## 2017-12-02 RX ADMIN — TIOTROPIUM BROMIDE 18 MCG: 18 CAPSULE ORAL; RESPIRATORY (INHALATION) at 08:05

## 2017-12-02 RX ADMIN — INSULIN GLARGINE 47 UNITS: 100 INJECTION, SOLUTION SUBCUTANEOUS at 21:12

## 2017-12-02 RX ADMIN — GABAPENTIN 300 MG: 300 CAPSULE ORAL at 08:07

## 2017-12-02 RX ADMIN — INSULIN LISPRO 2 UNITS: 100 INJECTION, SOLUTION INTRAVENOUS; SUBCUTANEOUS at 21:12

## 2017-12-02 RX ADMIN — MORPHINE SULFATE 2 MG: 4 INJECTION, SOLUTION INTRAMUSCULAR; INTRAVENOUS at 23:01

## 2017-12-02 RX ADMIN — ENOXAPARIN SODIUM 80 MG: 80 INJECTION SUBCUTANEOUS at 21:09

## 2017-12-02 RX ADMIN — ALLOPURINOL 100 MG: 100 TABLET ORAL at 08:07

## 2017-12-02 RX ADMIN — SODIUM POLYSTYRENE SULFONATE 15 G: 15 SUSPENSION ORAL; RECTAL at 10:50

## 2017-12-02 RX ADMIN — KETOROLAC TROMETHAMINE 15 MG: 30 INJECTION, SOLUTION INTRAMUSCULAR at 15:17

## 2017-12-02 RX ADMIN — GABAPENTIN 300 MG: 300 CAPSULE ORAL at 21:10

## 2017-12-02 RX ADMIN — ENOXAPARIN SODIUM 80 MG: 80 INJECTION SUBCUTANEOUS at 08:06

## 2017-12-02 RX ADMIN — KETOROLAC TROMETHAMINE 15 MG: 30 INJECTION, SOLUTION INTRAMUSCULAR at 10:49

## 2017-12-02 RX ADMIN — TAMSULOSIN HYDROCHLORIDE 0.4 MG: 0.4 CAPSULE ORAL at 08:07

## 2017-12-02 RX ADMIN — Medication 10 ML: at 21:09

## 2017-12-02 RX ADMIN — ATORVASTATIN CALCIUM 40 MG: 40 TABLET, FILM COATED ORAL at 21:09

## 2017-12-02 RX ADMIN — TRAZODONE HYDROCHLORIDE 100 MG: 100 TABLET ORAL at 21:09

## 2017-12-02 ASSESSMENT — PAIN SCALES - GENERAL
PAINLEVEL_OUTOF10: 5
PAINLEVEL_OUTOF10: 5
PAINLEVEL_OUTOF10: 8
PAINLEVEL_OUTOF10: 7
PAINLEVEL_OUTOF10: 8
PAINLEVEL_OUTOF10: 7
PAINLEVEL_OUTOF10: 0

## 2017-12-02 ASSESSMENT — PAIN DESCRIPTION - PAIN TYPE
TYPE: ACUTE PAIN;SURGICAL PAIN
TYPE: SURGICAL PAIN

## 2017-12-02 ASSESSMENT — PAIN DESCRIPTION - LOCATION
LOCATION: LEG
LOCATION: LEG

## 2017-12-02 ASSESSMENT — PAIN DESCRIPTION - ORIENTATION: ORIENTATION: RIGHT

## 2017-12-02 NOTE — PROGRESS NOTES
Patient still cc dizziness. BP 90/52 Held patient's hydralizine. Abdomin distended hypo-bs noted throughout. Positive BM large this afternoon. Dr. Leonard Stuart notified.   KUB ordered

## 2017-12-02 NOTE — PROGRESS NOTES
104/59   Pulse 65   Temp 97.3 °F (36.3 °C) (Temporal)   Resp 18   Ht 5' 11\" (1.803 m)   Wt 184 lb 6 oz (83.6 kg)   SpO2 97%   BMI 25.72 kg/m²   24HR INTAKE/OUTPUT:      Intake/Output Summary (Last 24 hours) at 12/02/17 0955  Last data filed at 12/02/17 0844   Gross per 24 hour   Intake          1726.75 ml   Output               50 ml   Net          1676.75 ml     General appearance: alert, appears stated age, cooperative and no distress, resting comfortably in chair  Head: Normocephalic, without obvious abnormality, atraumatic  Eyes: conjunctivae/corneas clear. PERRL, EOM's intact. Ears: normal external ears and nose, throat without exudate  Neck: no adenopathy, no carotid bruit, no JVD, supple, symmetrical, trachea midline and thyroid not enlarged, symmetric, no tenderness/mass/nodules  Lungs: Diminished throughout otherwise clear to auscultation bilaterally  Heart: Normal prosthetic heart tones, S1, S2, no chest wall tenderness  Abdomen:soft, non-tender; non-distended, normal bowel sounds no masses, no organomegaly  Extremities:s/p right BKA  Dressing in place, LLE without edema or tenderness to palpation  Skin: Skin color, texture, turgor normal. No rashes or lesions  Lymphatic: No palpable lymph node enlargment  Neurologic: Alert and oriented X 3, generalized weakness, normal tone. Normal symmetric reflexes.   Cranial nerves:II-XII Grossly intact Sensory: normal Motor:grossly normal  Psychiatric: Alert and oriented, thought content appropriate, normal insight, mood appropriate    Medications:      sodium chloride 75 mL/hr at 12/02/17 0323    dextrose        [START ON 12/3/2017] lactulose  20 g Oral Daily    sodium polystyrene  15 g Oral Once    vancomycin  1,250 mg Intravenous Q18H    meropenem  1 g Intravenous Q8H    ketorolac  15 mg Intravenous Q6H    enoxaparin  1 mg/kg Subcutaneous BID    insulin glargine  47 Units Subcutaneous Nightly    sodium chloride flush  10 mL Intravenous 2 times per evidence of acute osteomyelitis. CXR 11/27/2017  1. Cardiomegaly with prominent central vessels and small bilateral  pleural effusions. The findings are most likely due to congestive  failure. Correlate clinically. 2. Prior median sternotomy and heart valve placement. 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 Silvina Ruggiero    Active Problems:    History of heart valve replacement with mechanical valve-continued on full dose Lovenox, Coumadin held 2' need for surgery    Type 2 diabetes mellitus with foot ulcer, with long-term current use of insulin (HCC)-SSI, lantus    Acquired hypothyroidism-synthroid    Essential hypertension-has been hypotensive, monitor and continue to hold BP meds    Mixed hyperlipidemia-noted, statin continued    Glaucoma-noted    Panlobular emphysema (HCC)-possible mild exacerbation with complaint of cough, more likely irritation from intubation during surgery yesterday. Will monitor for now    Stage 3 chronic kidney disease-monitor, creatinine is now 1.1    Cirrhosis of liver (HCC)-noted, Lactulose continued     Acute blood loss anemia-2 units PRBC, received PRBC in OR, monitor     Hypotension-monitor, continue to hold BP medications    Diarrhea-likely 2' lactulose, C diff neg    Hyperkalemia-Kayexalate x 1 dose, recheck this afternoon     Can likely narrow antibiotics, will discuss with attending. Monitor hemoglobin, suspect he will require additional transfusion. Monitor creatinine closely. If cough worsens or develops shortness of breath will check CXR. Fasting glucose 298 this morning. May need increase in Lantus    Antibiotic: Merrem, Vancomycin    DVT Prophylaxis: Lovenox    Carlton Briceno PA-C     ______________________________________________________________________  I have seen and evaluated the patient by myself. I agree with the plan and documentation per the APRN/PA. Please see addendum. Subjective:    No complaints currently. No fevers or chills. No chest pain. No N/V. No SOB. Appetite ok.     Objective:   General:  NAD. Pleasant and interactive. Respiratory:  CTA without rhonchi or wheezes. Effort regular and unlabored. Cardiovascular:  RRR with expected murmur with mechanical valve replacement. No rubs or gallops. Normal S1/S2. Extremities:  No clubbing or cyanosis or edema. Right BKA with dressing intact. GI:  Abdomen soft, NT, ND.  +BS. No guarding. Neuro:  CN II-XII intact. No focal motor deficits. Psych:  Alert and oriented times 3.        Plan:  Kayexalate times one. IVF. Continue full dose lovenox given mechanical valve. BKA per vascular surgery. Monitor BP and Hgb closely .   Repeat     Electronically signed by Kevin Campbell DO on 12/2/17 at 2:05 PM

## 2017-12-02 NOTE — OP NOTE
[facility] [employer] [facility] [address] [facility]                                 OPERATIVE REPORT    PATIENT NAME: ERNE Shepherd                  :        1950  MED REC NO:   [id number]                              ROOM:       [facility]  ACCOUNT NO:   [de-identified]                           ADMIT DATE: 2017  PROVIDER:     Mily Barraza MD      DATE OF PROCEDURE:  2017    PREOPERATIVE DIAGNOSIS:  Open amputation of the right leg done for  infection with planned staged conversion to formal below or above knee  amputation. POSTOPERATIVE DIAGNOSIS:  Open amputation of the right leg done for  infection with planned staged conversion to formal below or above knee  amputation. OPERATION PERFORMED:  Right below the knee amputation. SURGEON:  Mily Barraza MD    ANESTHESIA:  General endotracheal anesthesia. FIRST ASSISTANT:  Allie Linda. OPERATIVE PROCEDURE:  The patient was brought into the operating room. Appropriate checklist was performed. Antibiotics were up-to-date. General  endotracheal anesthesia was induced. The patient was prepped in the  abdomen all the way down the right leg to the end of the stump. Inspection of this area revealed that we had no signs of infection. No  necrotic tissue and I had about 10 cm below the tibial tuberosity. I felt  that we could fashioned this into a below the knee amputation. I turned my  attention initially to the tibia where I _____ and cut it about 3 cm  shorter and did the same on the fibula. I then proceeded to go through the muscle of the various compartments,  removing the distal 2 to 3 cm of muscle. Bleeding was controlled with  sutures of 3-0 and 4-0 Prolene as well as touch Bovie coagulation. I  freshened up the skin edges.   I needed to take a little bit more of bone  away, so therefore I took another centimeter of bone, bevelled the anterior  one third and then took

## 2017-12-03 ENCOUNTER — APPOINTMENT (OUTPATIENT)
Dept: CT IMAGING | Age: 67
DRG: 240 | End: 2017-12-03
Attending: SURGERY
Payer: COMMERCIAL

## 2017-12-03 LAB
ABO/RH: NORMAL
ABO/RH: NORMAL
ALBUMIN SERPL-MCNC: 2.2 G/DL (ref 3.5–5.2)
ALP BLD-CCNC: 88 U/L (ref 40–130)
ALT SERPL-CCNC: 12 U/L (ref 5–41)
AMMONIA: 49 UMOL/L (ref 16–60)
ANION GAP SERPL CALCULATED.3IONS-SCNC: 7 MMOL/L (ref 7–19)
ANTIBODY SCREEN: NORMAL
ANTIBODY SCREEN: NORMAL
AST SERPL-CCNC: 19 U/L (ref 5–40)
BILIRUB SERPL-MCNC: <0.2 MG/DL (ref 0.2–1.2)
BUN BLDV-MCNC: 37 MG/DL (ref 8–23)
C DIFFICILE TOXIN, EIA: NORMAL
CALCIUM SERPL-MCNC: 7.4 MG/DL (ref 8.8–10.2)
CHLORIDE BLD-SCNC: 96 MMOL/L (ref 98–111)
CO2: 29 MMOL/L (ref 22–29)
CREAT SERPL-MCNC: 1.3 MG/DL (ref 0.5–1.2)
GFR NON-AFRICAN AMERICAN: 55
GLUCOSE BLD-MCNC: 119 MG/DL (ref 74–109)
GLUCOSE BLD-MCNC: 148 MG/DL (ref 70–99)
GLUCOSE BLD-MCNC: 166 MG/DL (ref 70–99)
GLUCOSE BLD-MCNC: 176 MG/DL (ref 70–99)
GLUCOSE BLD-MCNC: 236 MG/DL (ref 70–99)
HCT VFR BLD CALC: 23.3 % (ref 42–52)
HEMOGLOBIN: 6.9 G/DL (ref 14–18)
INR BLD: 1.28 (ref 0.88–1.18)
MCH RBC QN AUTO: 27.4 PG (ref 27–31)
MCHC RBC AUTO-ENTMCNC: 29.6 G/DL (ref 33–37)
MCV RBC AUTO: 92.5 FL (ref 80–94)
PDW BLD-RTO: 16.3 % (ref 11.5–14.5)
PERFORMED ON: ABNORMAL
PLATELET # BLD: 209 K/UL (ref 130–400)
PMV BLD AUTO: 9.8 FL (ref 9.4–12.4)
POTASSIUM SERPL-SCNC: 4.7 MMOL/L (ref 3.5–5)
PROTHROMBIN TIME: 16 SEC (ref 12–14.6)
RBC # BLD: 2.52 M/UL (ref 4.7–6.1)
SODIUM BLD-SCNC: 132 MMOL/L (ref 136–145)
TOTAL PROTEIN: 5.1 G/DL (ref 6.6–8.7)
WBC # BLD: 13.5 K/UL (ref 4.8–10.8)

## 2017-12-03 PROCEDURE — 36415 COLL VENOUS BLD VENIPUNCTURE: CPT

## 2017-12-03 PROCEDURE — P9016 RBC LEUKOCYTES REDUCED: HCPCS

## 2017-12-03 PROCEDURE — 74176 CT ABD & PELVIS W/O CONTRAST: CPT

## 2017-12-03 PROCEDURE — 82948 REAGENT STRIP/BLOOD GLUCOSE: CPT

## 2017-12-03 PROCEDURE — 6370000000 HC RX 637 (ALT 250 FOR IP): Performed by: SURGERY

## 2017-12-03 PROCEDURE — 6360000002 HC RX W HCPCS: Performed by: HOSPITALIST

## 2017-12-03 PROCEDURE — 86900 BLOOD TYPING SEROLOGIC ABO: CPT

## 2017-12-03 PROCEDURE — 6370000000 HC RX 637 (ALT 250 FOR IP): Performed by: NURSE PRACTITIONER

## 2017-12-03 PROCEDURE — 36430 TRANSFUSION BLD/BLD COMPNT: CPT

## 2017-12-03 PROCEDURE — 2700000000 HC OXYGEN THERAPY PER DAY

## 2017-12-03 PROCEDURE — 80053 COMPREHEN METABOLIC PANEL: CPT

## 2017-12-03 PROCEDURE — 2580000003 HC RX 258: Performed by: SURGERY

## 2017-12-03 PROCEDURE — 86901 BLOOD TYPING SEROLOGIC RH(D): CPT

## 2017-12-03 PROCEDURE — 82140 ASSAY OF AMMONIA: CPT

## 2017-12-03 PROCEDURE — 6370000000 HC RX 637 (ALT 250 FOR IP): Performed by: INTERNAL MEDICINE

## 2017-12-03 PROCEDURE — 2580000003 HC RX 258: Performed by: PHYSICIAN ASSISTANT

## 2017-12-03 PROCEDURE — 6360000002 HC RX W HCPCS: Performed by: SURGERY

## 2017-12-03 PROCEDURE — 99024 POSTOP FOLLOW-UP VISIT: CPT | Performed by: SURGERY

## 2017-12-03 PROCEDURE — 87324 CLOSTRIDIUM AG IA: CPT

## 2017-12-03 PROCEDURE — 85610 PROTHROMBIN TIME: CPT

## 2017-12-03 PROCEDURE — 99232 SBSQ HOSP IP/OBS MODERATE 35: CPT | Performed by: INTERNAL MEDICINE

## 2017-12-03 PROCEDURE — 85027 COMPLETE CBC AUTOMATED: CPT

## 2017-12-03 PROCEDURE — 6360000002 HC RX W HCPCS: Performed by: PHYSICIAN ASSISTANT

## 2017-12-03 PROCEDURE — 2500000003 HC RX 250 WO HCPCS: Performed by: INTERNAL MEDICINE

## 2017-12-03 PROCEDURE — 1210000000 HC MED SURG R&B

## 2017-12-03 PROCEDURE — 86850 RBC ANTIBODY SCREEN: CPT

## 2017-12-03 RX ORDER — 0.9 % SODIUM CHLORIDE 0.9 %
250 INTRAVENOUS SOLUTION INTRAVENOUS ONCE
Status: COMPLETED | OUTPATIENT
Start: 2017-12-03 | End: 2017-12-03

## 2017-12-03 RX ORDER — CALCIUM CARBONATE 200(500)MG
500 TABLET,CHEWABLE ORAL EVERY 4 HOURS PRN
Status: DISCONTINUED | OUTPATIENT
Start: 2017-12-03 | End: 2017-12-06 | Stop reason: HOSPADM

## 2017-12-03 RX ORDER — PANTOPRAZOLE SODIUM 40 MG/1
40 TABLET, DELAYED RELEASE ORAL
Status: DISCONTINUED | OUTPATIENT
Start: 2017-12-03 | End: 2017-12-06 | Stop reason: HOSPADM

## 2017-12-03 RX ADMIN — Medication 10 ML: at 08:48

## 2017-12-03 RX ADMIN — KETOROLAC TROMETHAMINE 15 MG: 30 INJECTION, SOLUTION INTRAMUSCULAR at 05:48

## 2017-12-03 RX ADMIN — INSULIN LISPRO 6 UNITS: 100 INJECTION, SOLUTION INTRAVENOUS; SUBCUTANEOUS at 13:28

## 2017-12-03 RX ADMIN — GABAPENTIN 300 MG: 300 CAPSULE ORAL at 08:44

## 2017-12-03 RX ADMIN — ALLOPURINOL 100 MG: 100 TABLET ORAL at 21:17

## 2017-12-03 RX ADMIN — KETOROLAC TROMETHAMINE 15 MG: 30 INJECTION, SOLUTION INTRAMUSCULAR at 08:44

## 2017-12-03 RX ADMIN — GUAIFENESIN 1200 MG: 600 TABLET, EXTENDED RELEASE ORAL at 21:17

## 2017-12-03 RX ADMIN — TAMSULOSIN HYDROCHLORIDE 0.4 MG: 0.4 CAPSULE ORAL at 10:18

## 2017-12-03 RX ADMIN — METOPROLOL SUCCINATE 25 MG: 25 TABLET, EXTENDED RELEASE ORAL at 10:18

## 2017-12-03 RX ADMIN — INSULIN LISPRO 3 UNITS: 100 INJECTION, SOLUTION INTRAVENOUS; SUBCUTANEOUS at 17:09

## 2017-12-03 RX ADMIN — SODIUM CHLORIDE: 9 INJECTION, SOLUTION INTRAVENOUS at 21:26

## 2017-12-03 RX ADMIN — ENOXAPARIN SODIUM 80 MG: 80 INJECTION SUBCUTANEOUS at 10:18

## 2017-12-03 RX ADMIN — INSULIN LISPRO 3 UNITS: 100 INJECTION, SOLUTION INTRAVENOUS; SUBCUTANEOUS at 08:51

## 2017-12-03 RX ADMIN — SODIUM CHLORIDE 20 ML: 9 INJECTION, SOLUTION INTRAVENOUS at 14:38

## 2017-12-03 RX ADMIN — HYDROCODONE BITARTRATE AND ACETAMINOPHEN 2 TABLET: 5; 325 TABLET ORAL at 21:17

## 2017-12-03 RX ADMIN — ASPIRIN 81 MG CHEWABLE TABLET 81 MG: 81 TABLET CHEWABLE at 08:44

## 2017-12-03 RX ADMIN — ALLOPURINOL 100 MG: 100 TABLET ORAL at 10:18

## 2017-12-03 RX ADMIN — ATORVASTATIN CALCIUM 40 MG: 40 TABLET, FILM COATED ORAL at 21:17

## 2017-12-03 RX ADMIN — INSULIN GLARGINE 47 UNITS: 100 INJECTION, SOLUTION SUBCUTANEOUS at 21:18

## 2017-12-03 RX ADMIN — GUAIFENESIN 1200 MG: 600 TABLET, EXTENDED RELEASE ORAL at 08:44

## 2017-12-03 RX ADMIN — FLUOXETINE 20 MG: 20 CAPSULE ORAL at 21:17

## 2017-12-03 RX ADMIN — INSULIN LISPRO 3 UNITS: 100 INJECTION, SOLUTION INTRAVENOUS; SUBCUTANEOUS at 21:18

## 2017-12-03 RX ADMIN — TRAZODONE HYDROCHLORIDE 100 MG: 100 TABLET ORAL at 21:17

## 2017-12-03 RX ADMIN — LATANOPROST 1 DROP: 50 SOLUTION OPHTHALMIC at 21:28

## 2017-12-03 RX ADMIN — GABAPENTIN 300 MG: 300 CAPSULE ORAL at 21:17

## 2017-12-03 RX ADMIN — MORPHINE SULFATE 2 MG: 4 INJECTION, SOLUTION INTRAMUSCULAR; INTRAVENOUS at 19:49

## 2017-12-03 RX ADMIN — LEVOTHYROXINE SODIUM 75 MCG: 75 TABLET ORAL at 10:18

## 2017-12-03 RX ADMIN — MEROPENEM 1 G: 1 INJECTION, POWDER, FOR SOLUTION INTRAVENOUS at 17:01

## 2017-12-03 RX ADMIN — CALCIUM CARBONATE 500 MG: 500 TABLET, CHEWABLE ORAL at 22:43

## 2017-12-03 RX ADMIN — VANCOMYCIN HYDROCHLORIDE 1500 MG: 1 INJECTION, POWDER, LYOPHILIZED, FOR SOLUTION INTRAVENOUS at 18:16

## 2017-12-03 RX ADMIN — Medication 10 ML: at 21:19

## 2017-12-03 RX ADMIN — ENOXAPARIN SODIUM 80 MG: 80 INJECTION SUBCUTANEOUS at 21:16

## 2017-12-03 RX ADMIN — ALLOPURINOL 100 MG: 100 TABLET ORAL at 00:49

## 2017-12-03 ASSESSMENT — PAIN SCALES - GENERAL
PAINLEVEL_OUTOF10: 8
PAINLEVEL_OUTOF10: 7
PAINLEVEL_OUTOF10: 5
PAINLEVEL_OUTOF10: 7

## 2017-12-03 NOTE — PROGRESS NOTES
Nightly    FLUoxetine  20 mg Oral Nightly    gabapentin  300 mg Oral BID    guaiFENesin  1,200 mg Oral BID    latanoprost  1 drop Both Eyes Nightly    levothyroxine  75 mcg Oral Daily    metoprolol succinate  25 mg Oral Daily    tamsulosin  0.4 mg Oral Daily    tiotropium  18 mcg Inhalation Daily    traZODone  100 mg Oral Nightly    insulin lispro  0-18 Units Subcutaneous TID WC    insulin lispro  0-9 Units Subcutaneous Nightly     acetaminophen, HYDROcodone 5 mg - acetaminophen **OR** HYDROcodone 5 mg - acetaminophen, ondansetron, ondansetron, morphine, sodium chloride flush, magnesium hydroxide, potassium chloride **OR** potassium chloride **OR** potassium chloride, glucose, dextrose, glucagon (rDNA), dextrose  DIET CARB CONTROL; Carb Control: 4 carbs/meal (approximate 1800 kcals/day)     Lab and other Data:     Recent Labs      12/02/17   0528  12/02/17   1751  12/03/17   0328   WBC  7.0  11.6*  13.5*   HGB  7.4*  7.8*  6.9*   PLT  195  221  209     Recent Labs      12/01/17   0303  12/02/17   0528  12/02/17   1344  12/03/17   0328   NA  137  132*   --   132*   K  4.6  5.5*  4.5  4.7   CL  97*  95*   --   96*   CO2  31*  30*   --   29   BUN  30*  33*   --   37*   CREATININE  1.0  1.1   --   1.3*   GLUCOSE  137*  303*   --   119*     Recent Labs      12/01/17   0303  12/02/17   0528  12/03/17   0328   AST  16  14  19   ALT  15  12  12   BILITOT  <0.2  <0.2  <0.2   ALKPHOS  115  94  88     INR:   Recent Labs      12/01/17   0303  12/02/17   0528  12/03/17   0328   INR  1.52*  1.19*  1.28*     RAD:   CT Abdomen/Pelvis 12/03/2017  1. Mild mucosal thickening involving the colon and rectum compatible  with mild acute colitis/proctitis. There is mild fluid retention  within the colon and no obstruction is seen. 2. The appendix is normal.  3. Cirrhotic changes of the liver with splenomegaly and probable  portal hypertension.  This may account for the slightly increased  attenuation of fat planes along the neg    Hyperkalemia-resolved    Await results Ammonia, transfuse PRBC, continue IVF's, full dose lovenox. On Merrem, Vanc per Dr. Saeed Villasenor 48-72 hrs post op for residual soft tissue infection     Antibiotic: Merrem, Vancomycin    DVT Prophylaxis: Lovenox    Malia Navarro PA-C     ______________________________________________________________________  I have seen and evaluated the patient by myself. I agree with the plan and documentation per the APRN/PA. Please see addendum. Subjective:    Diarrhea today times 7, he is on lactulose and got kayexalate however.  No fevers or chills.  No chest pain.  No N/V.  No SOB.  Appetite ok.     Objective:   General:  NAD.  Pleasant and interactive. Respiratory:  CTA without rhonchi or wheezes.  Effort regular and unlabored.    Cardiovascular:  RRR with expected murmur with mechanical valve replacement.  No rubs or gallops.  Normal S1/S2.    Extremities:  No clubbing or cyanosis or edema.  Right BKA with dressing intact.    GI:  Abdomen soft, NT, ND.  +BS.  No guarding. Neuro:  CN II-XII intact.  No focal motor deficits. Psych:  Alert and oriented times 3.        Plan:  Reviewed CT abdomen. Check c diff. Flagyl 500 mg IV Q8H.  IVF. Continue full dose lovenox given mechanical valve.  BKA per vascular surgery.  Monitor BP and Hgb closely .  Repeat labs in am.  Further recommendations per clinical course.       Electronically signed by Juani Cunningham DO on 12/3/17 at 4:52 PM

## 2017-12-03 NOTE — PROGRESS NOTES
Claritza Lozoya is a 69 y.o. male patient.     POD 4/2- BKA    Mild pain in stump, some diarrhea issues    Current Facility-Administered Medications   Medication Dose Route Frequency Provider Last Rate Last Dose    0.9 % sodium chloride bolus  250 mL Intravenous Once Rose BRIGHT Robles 20 mL/hr at 12/03/17 1438 20 mL at 12/03/17 1438    meropenem (MERREM) 1 g in sterile water 20 mL IV syringe  1 g Intravenous Q8H Rose BRIGHT Robles        vancomycin (VANCOCIN) 1,500 mg in dextrose 5 % 500 mL IVPB  1,500 mg Intravenous Once Rose BRIGHT Robles        vancomycin [facility]) intermittent dosing (placeholder)   Other RX Placeholder Rose BRIGHT Robles        lactulose (CHRONULAC) 10 GM/15ML solution 20 g  20 g Oral Daily Rose BRIGHT Robles        acetaminophen (TYLENOL) tablet 650 mg  650 mg Oral Q4H PRN Deborah Baer MD   650 mg at 12/02/17 2110    HYDROcodone-acetaminophen (NORCO) 5-325 MG per tablet 1 tablet  1 tablet Oral Q4H PRN Deborah Baer MD        Or    HYDROcodone-acetaminophen (NORCO) 5-325 MG per tablet 2 tablet  2 tablet Oral Q4H PRN Deborha Baer MD   2 tablet at 12/02/17 1615    ondansetron (ZOFRAN) injection 4 mg  4 mg Intravenous Q6H PRN Deborah Baer MD        0.9 % sodium chloride infusion   Intravenous Continuous Deborah Baer MD 75 mL/hr at 12/02/17 0323      enoxaparin (LOVENOX) injection 80 mg  1 mg/kg Subcutaneous BID Rigo Huitron MD   80 mg at 12/03/17 1018    insulin glargine (LANTUS) injection vial 47 Units  47 Units Subcutaneous Nightly Carol Mcconnell MD   47 Units at 12/02/17 2112    ondansetron (ZOFRAN) injection 4 mg  4 mg Intravenous Q6H PRN Deborah Baer MD   4 mg at 11/29/17 1552    morphine injection 2 mg  2 mg Intravenous Q3H PRN Deborah Baer MD   2 mg at 12/02/17 2301    sodium chloride flush 0.9 % injection 10 mL  10 mL Intravenous 2 times per day Deborah Baer MD   10 mL at 12/03/17 0848    sodium chloride flush 0.9 % injection 10 mL  10 mL Intravenous PRN Marichuy Del Cid MD        magnesium hydroxide (MILK OF MAGNESIA) 400 MG/5ML suspension 30 mL  30 mL Oral Daily PRN Marichuy Del Cid MD        potassium chloride (KLOR-CON M) extended release tablet 40 mEq  40 mEq Oral PRN Marichuy De lCid MD        Or    potassium chloride 20 MEQ/15ML (10%) oral solution 40 mEq  40 mEq Oral PRN Marichuy Del Cid MD        Or    potassium chloride 10 mEq/100 mL IVPB (Peripheral Line)  10 mEq Intravenous PRN Marichuy Del Cid MD        allopurinol (ZYLOPRIM) tablet 100 mg  100 mg Oral BID Elly Mario, APRN   100 mg at 12/03/17 1018    aspirin chewable tablet 81 mg  81 mg Oral Daily Elly Mario, APRN   81 mg at 12/03/17 0844    atorvastatin (LIPITOR) tablet 40 mg  40 mg Oral Nightly Elly Mario, APRN   40 mg at 12/02/17 2109    FLUoxetine (PROZAC) capsule 20 mg  20 mg Oral Nightly Elly Mario, APRN   20 mg at 12/02/17 2110    gabapentin (NEURONTIN) capsule 300 mg  300 mg Oral BID Elly Mario, APRN   300 mg at 12/03/17 0844    guaiFENesin (MUCINEX) extended release tablet 1,200 mg  1,200 mg Oral BID Elly Mario, APRN   1,200 mg at 12/03/17 0844    latanoprost (XALATAN) 0.005 % ophthalmic solution 1 drop  1 drop Both Eyes Nightly Elly Mario, APRN   1 drop at 12/01/17 2008    levothyroxine (SYNTHROID) tablet 75 mcg  75 mcg Oral Daily Elly Mario, APRN   75 mcg at 12/03/17 1018    metoprolol succinate (TOPROL XL) extended release tablet 25 mg  25 mg Oral Daily Elly Mario, APRN   25 mg at 12/03/17 1018    tamsulosin (FLOMAX) capsule 0.4 mg  0.4 mg Oral Daily Elly Mario, APRN   0.4 mg at 12/03/17 1018    tiotropium (SPIRIVA) inhalation capsule 18 mcg  18 mcg Inhalation Daily Elly Mario, APRN   18 mcg at 12/02/17 0805    traZODone (DESYREL) tablet 100 mg  100 mg Oral Nightly Elly Martin, APRN   100 mg at 12/02/17 2109    glucose (GLUTOSE) 40 % oral gel 15 g  15 g Oral PRN Elly Mario, APRN        dextrose 50 % solution 12.5 g  12.5 g Intravenous PRN Elly Amrio, APRN        glucagon (rDNA) injection 1 mg  1 mg Intramuscular PRN Elly Mario, APRN        dextrose 5 % solution  100 mL/hr Intravenous PRN Elly Mario, APRN        insulin lispro (HUMALOG) injection vial 0-18 Units  0-18 Units Subcutaneous TID WC Elly Mario, APRN   6 Units at 12/03/17 1328    insulin lispro (HUMALOG) injection vial 0-9 Units  0-9 Units Subcutaneous Nightly Elly Mario, APRN   2 Units at 12/02/17 2112     Allergies   Allergen Reactions    Prazosin      Principal Problem:    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 rate 18, height 5' 11\" (1.803 m), weight 184 lb 6 oz (83.6 kg), SpO2 98 %. Subjective:  Symptoms:  Stable. Diet:  Adequate intake. Activity level: Impaired due to pain. Pain:  He complains of pain that is moderate. He reports pain is unchanged. Pain is partially controlled. Objective:  General Appearance:  Comfortable. Vital signs: (most recent): Blood pressure 110/60, pulse 77, temperature 97.8 °F (36.6 °C), temperature source Temporal, resp. rate 18, height 5' 11\" (1.803 m), weight 184 lb 6 oz (83.6 kg), SpO2 98 %. Vital signs are normal.    Lungs:  Normal effort. Heart: Normal rate. Ext- wrapped, no signs of bleeding      Assessment:    Condition: In stable condition. Improving.      Hgb <7, getting

## 2017-12-04 LAB
ALBUMIN SERPL-MCNC: 2.5 G/DL (ref 3.5–5.2)
ALP BLD-CCNC: 89 U/L (ref 40–130)
ALT SERPL-CCNC: 13 U/L (ref 5–41)
ANION GAP SERPL CALCULATED.3IONS-SCNC: 9 MMOL/L (ref 7–19)
AST SERPL-CCNC: 25 U/L (ref 5–40)
BILIRUB SERPL-MCNC: 0.3 MG/DL (ref 0.2–1.2)
BUN BLDV-MCNC: 27 MG/DL (ref 8–23)
CALCIUM SERPL-MCNC: 7.7 MG/DL (ref 8.8–10.2)
CHLORIDE BLD-SCNC: 101 MMOL/L (ref 98–111)
CO2: 29 MMOL/L (ref 22–29)
CREAT SERPL-MCNC: 0.9 MG/DL (ref 0.5–1.2)
GFR NON-AFRICAN AMERICAN: >60
GLUCOSE BLD-MCNC: 118 MG/DL (ref 70–99)
GLUCOSE BLD-MCNC: 175 MG/DL (ref 70–99)
GLUCOSE BLD-MCNC: 182 MG/DL (ref 70–99)
GLUCOSE BLD-MCNC: 203 MG/DL (ref 70–99)
GLUCOSE BLD-MCNC: 80 MG/DL (ref 74–109)
HCT VFR BLD CALC: 25 % (ref 42–52)
HEMOGLOBIN: 7.7 G/DL (ref 14–18)
INR BLD: 1.16 (ref 0.88–1.18)
MCH RBC QN AUTO: 26.9 PG (ref 27–31)
MCHC RBC AUTO-ENTMCNC: 30.8 G/DL (ref 33–37)
MCV RBC AUTO: 87.4 FL (ref 80–94)
PDW BLD-RTO: 16.4 % (ref 11.5–14.5)
PERFORMED ON: ABNORMAL
PLATELET # BLD: 201 K/UL (ref 130–400)
PMV BLD AUTO: 10 FL (ref 9.4–12.4)
POTASSIUM SERPL-SCNC: 3.7 MMOL/L (ref 3.5–5)
PROTHROMBIN TIME: 14.8 SEC (ref 12–14.6)
RBC # BLD: 2.86 M/UL (ref 4.7–6.1)
SODIUM BLD-SCNC: 139 MMOL/L (ref 136–145)
TOTAL PROTEIN: 4.6 G/DL (ref 6.6–8.7)
VANCOMYCIN RANDOM: 13.9 UG/ML
WBC # BLD: 12 K/UL (ref 4.8–10.8)

## 2017-12-04 PROCEDURE — 85610 PROTHROMBIN TIME: CPT

## 2017-12-04 PROCEDURE — 85027 COMPLETE CBC AUTOMATED: CPT

## 2017-12-04 PROCEDURE — 2580000003 HC RX 258: Performed by: PHYSICIAN ASSISTANT

## 2017-12-04 PROCEDURE — 6370000000 HC RX 637 (ALT 250 FOR IP): Performed by: NURSE PRACTITIONER

## 2017-12-04 PROCEDURE — 80053 COMPREHEN METABOLIC PANEL: CPT

## 2017-12-04 PROCEDURE — 2700000000 HC OXYGEN THERAPY PER DAY

## 2017-12-04 PROCEDURE — 99232 SBSQ HOSP IP/OBS MODERATE 35: CPT | Performed by: FAMILY MEDICINE

## 2017-12-04 PROCEDURE — G8979 MOBILITY GOAL STATUS: HCPCS

## 2017-12-04 PROCEDURE — 2580000003 HC RX 258: Performed by: SURGERY

## 2017-12-04 PROCEDURE — 36415 COLL VENOUS BLD VENIPUNCTURE: CPT

## 2017-12-04 PROCEDURE — 99024 POSTOP FOLLOW-UP VISIT: CPT | Performed by: SURGERY

## 2017-12-04 PROCEDURE — 6370000000 HC RX 637 (ALT 250 FOR IP): Performed by: SURGERY

## 2017-12-04 PROCEDURE — 97161 PT EVAL LOW COMPLEX 20 MIN: CPT

## 2017-12-04 PROCEDURE — 6370000000 HC RX 637 (ALT 250 FOR IP): Performed by: INTERNAL MEDICINE

## 2017-12-04 PROCEDURE — 1210000000 HC MED SURG R&B

## 2017-12-04 PROCEDURE — 80202 ASSAY OF VANCOMYCIN: CPT

## 2017-12-04 PROCEDURE — 6370000000 HC RX 637 (ALT 250 FOR IP): Performed by: PHYSICIAN ASSISTANT

## 2017-12-04 PROCEDURE — G8987 SELF CARE CURRENT STATUS: HCPCS

## 2017-12-04 PROCEDURE — 6360000002 HC RX W HCPCS: Performed by: PHYSICIAN ASSISTANT

## 2017-12-04 PROCEDURE — G8988 SELF CARE GOAL STATUS: HCPCS

## 2017-12-04 PROCEDURE — G8978 MOBILITY CURRENT STATUS: HCPCS

## 2017-12-04 PROCEDURE — 97165 OT EVAL LOW COMPLEX 30 MIN: CPT

## 2017-12-04 PROCEDURE — 6360000002 HC RX W HCPCS: Performed by: HOSPITALIST

## 2017-12-04 PROCEDURE — 82948 REAGENT STRIP/BLOOD GLUCOSE: CPT

## 2017-12-04 PROCEDURE — 2500000003 HC RX 250 WO HCPCS: Performed by: INTERNAL MEDICINE

## 2017-12-04 PROCEDURE — 6360000002 HC RX W HCPCS: Performed by: SURGERY

## 2017-12-04 RX ORDER — WARFARIN SODIUM 5 MG/1
5 TABLET ORAL DAILY
Status: DISCONTINUED | OUTPATIENT
Start: 2017-12-04 | End: 2017-12-06 | Stop reason: HOSPADM

## 2017-12-04 RX ADMIN — Medication 10 ML: at 08:56

## 2017-12-04 RX ADMIN — HYDROCODONE BITARTRATE AND ACETAMINOPHEN 2 TABLET: 5; 325 TABLET ORAL at 06:13

## 2017-12-04 RX ADMIN — METRONIDAZOLE 500 MG: 500 INJECTION, SOLUTION INTRAVENOUS at 00:35

## 2017-12-04 RX ADMIN — WARFARIN SODIUM 5 MG: 5 TABLET ORAL at 18:33

## 2017-12-04 RX ADMIN — MORPHINE SULFATE 2 MG: 4 INJECTION, SOLUTION INTRAMUSCULAR; INTRAVENOUS at 17:03

## 2017-12-04 RX ADMIN — TIOTROPIUM BROMIDE 18 MCG: 18 CAPSULE ORAL; RESPIRATORY (INHALATION) at 08:50

## 2017-12-04 RX ADMIN — TRAZODONE HYDROCHLORIDE 100 MG: 100 TABLET ORAL at 21:28

## 2017-12-04 RX ADMIN — MEROPENEM 1 G: 1 INJECTION, POWDER, FOR SOLUTION INTRAVENOUS at 01:57

## 2017-12-04 RX ADMIN — ALLOPURINOL 100 MG: 100 TABLET ORAL at 08:55

## 2017-12-04 RX ADMIN — MEROPENEM 1 G: 1 INJECTION, POWDER, FOR SOLUTION INTRAVENOUS at 16:49

## 2017-12-04 RX ADMIN — ATORVASTATIN CALCIUM 40 MG: 40 TABLET, FILM COATED ORAL at 21:28

## 2017-12-04 RX ADMIN — INSULIN LISPRO 2 UNITS: 100 INJECTION, SOLUTION INTRAVENOUS; SUBCUTANEOUS at 21:29

## 2017-12-04 RX ADMIN — LEVOTHYROXINE SODIUM 75 MCG: 75 TABLET ORAL at 06:13

## 2017-12-04 RX ADMIN — FLUOXETINE 20 MG: 20 CAPSULE ORAL at 21:28

## 2017-12-04 RX ADMIN — METRONIDAZOLE 500 MG: 500 INJECTION, SOLUTION INTRAVENOUS at 16:54

## 2017-12-04 RX ADMIN — INSULIN LISPRO 6 UNITS: 100 INJECTION, SOLUTION INTRAVENOUS; SUBCUTANEOUS at 18:00

## 2017-12-04 RX ADMIN — GUAIFENESIN 1200 MG: 600 TABLET, EXTENDED RELEASE ORAL at 08:55

## 2017-12-04 RX ADMIN — MEROPENEM 1 G: 1 INJECTION, POWDER, FOR SOLUTION INTRAVENOUS at 10:37

## 2017-12-04 RX ADMIN — ALLOPURINOL 100 MG: 100 TABLET ORAL at 21:28

## 2017-12-04 RX ADMIN — TAMSULOSIN HYDROCHLORIDE 0.4 MG: 0.4 CAPSULE ORAL at 08:55

## 2017-12-04 RX ADMIN — ENOXAPARIN SODIUM 80 MG: 80 INJECTION SUBCUTANEOUS at 21:28

## 2017-12-04 RX ADMIN — HYDROCODONE BITARTRATE AND ACETAMINOPHEN 2 TABLET: 5; 325 TABLET ORAL at 19:54

## 2017-12-04 RX ADMIN — MORPHINE SULFATE 2 MG: 4 INJECTION, SOLUTION INTRAMUSCULAR; INTRAVENOUS at 02:15

## 2017-12-04 RX ADMIN — ASPIRIN 81 MG CHEWABLE TABLET 81 MG: 81 TABLET CHEWABLE at 08:55

## 2017-12-04 RX ADMIN — METRONIDAZOLE 500 MG: 500 INJECTION, SOLUTION INTRAVENOUS at 10:48

## 2017-12-04 RX ADMIN — GABAPENTIN 300 MG: 300 CAPSULE ORAL at 21:28

## 2017-12-04 RX ADMIN — MORPHINE SULFATE 2 MG: 4 INJECTION, SOLUTION INTRAMUSCULAR; INTRAVENOUS at 22:58

## 2017-12-04 RX ADMIN — CALCIUM CARBONATE 500 MG: 500 TABLET, CHEWABLE ORAL at 15:30

## 2017-12-04 RX ADMIN — INSULIN LISPRO 3 UNITS: 100 INJECTION, SOLUTION INTRAVENOUS; SUBCUTANEOUS at 13:52

## 2017-12-04 RX ADMIN — INSULIN GLARGINE 47 UNITS: 100 INJECTION, SOLUTION SUBCUTANEOUS at 21:29

## 2017-12-04 RX ADMIN — LACTULOSE 20 G: 20 SOLUTION ORAL at 08:51

## 2017-12-04 RX ADMIN — METOPROLOL SUCCINATE 25 MG: 25 TABLET, EXTENDED RELEASE ORAL at 08:55

## 2017-12-04 RX ADMIN — ENOXAPARIN SODIUM 80 MG: 80 INJECTION SUBCUTANEOUS at 08:52

## 2017-12-04 RX ADMIN — GUAIFENESIN 1200 MG: 600 TABLET, EXTENDED RELEASE ORAL at 21:28

## 2017-12-04 RX ADMIN — HYDROCODONE BITARTRATE AND ACETAMINOPHEN 2 TABLET: 5; 325 TABLET ORAL at 01:15

## 2017-12-04 RX ADMIN — VANCOMYCIN HYDROCHLORIDE 1250 MG: 1 INJECTION, POWDER, LYOPHILIZED, FOR SOLUTION INTRAVENOUS at 22:54

## 2017-12-04 RX ADMIN — GABAPENTIN 300 MG: 300 CAPSULE ORAL at 08:55

## 2017-12-04 RX ADMIN — HYDROCODONE BITARTRATE AND ACETAMINOPHEN 2 TABLET: 5; 325 TABLET ORAL at 14:28

## 2017-12-04 RX ADMIN — PANTOPRAZOLE SODIUM 40 MG: 40 TABLET, DELAYED RELEASE ORAL at 06:13

## 2017-12-04 ASSESSMENT — PAIN SCALES - GENERAL
PAINLEVEL_OUTOF10: 8
PAINLEVEL_OUTOF10: 8
PAINLEVEL_OUTOF10: 7
PAINLEVEL_OUTOF10: 7
PAINLEVEL_OUTOF10: 5
PAINLEVEL_OUTOF10: 9
PAINLEVEL_OUTOF10: 10
PAINLEVEL_OUTOF10: 7

## 2017-12-04 ASSESSMENT — PAIN DESCRIPTION - LOCATION: LOCATION: LEG

## 2017-12-04 ASSESSMENT — PAIN DESCRIPTION - PAIN TYPE: TYPE: SURGICAL PAIN

## 2017-12-04 ASSESSMENT — PAIN DESCRIPTION - ORIENTATION: ORIENTATION: RIGHT

## 2017-12-04 NOTE — PROGRESS NOTES
Was in patient's room. He asked me to put up all 4 bed rails. Said having all four up makes him feel safer. All four rails were put up. Patient is alert and oriented. Bed in low position. Call light within reach. Will continue to monitor.

## 2017-12-04 NOTE — PROGRESS NOTES
[facility] Hospitalists      Patient:  Mark Severino  YOB: 1950  Date of Service: 12/4/2017  MRN: [id number]   Acct: [de-identified]   Primary Care Physician: Pavel Dang NP  Advance Directive: Full Code  Admit Date: 11/27/2017        Hospital Day: 7    CHIEF COMPLAINT: Weakness, follow up Right lower extremity pain    SUBJECTIVE: Mr. Thania Cooley states he feels much better today after receiving blood on 12/03/2017. States his pain is fairly well controlled, diarrhea has improved     Cumulative Hospital Course:  Mr. Thania Cooley is a 69 y.o. male with PMHx DM, HTN, CAD, Cirrhosis, Mechanical MV, who was direct admit from wound care with right lower extremity open wound with achilles tendon visualized. Hospitalists have been consulted for medical management. He underwent a right open below knee amputation on 11/28/2017 and revision on 12/01/2017. Complained of abdominal cramping and diarrhea on 12/03/2017. CT concerning for colitis. Was placed on Flagyl.  Diarrhea now improving    Review of Systems:   Constitutional / general:  Denies fever / chills / sweats +fatigue  Head:  Denies headache / neck stiffness / trauma / visual change  Eyes:  Denies blurry vision / acute visual change or loss / itching / redness  ENT: Denies sore throat / hoarseness / nasal drainage / ear pain  CV:  Denies chest pain / palpitations/ orthopnea   Respiratory:  Denies cough / shortness of breath / sputum / hemoptysis  GI: Denies nausea / vomiting / abdominal pain / +diarrhea / Denies constipation  :  Denies dysuria / hesitancy / urgency / hematuria   Neuro: Denies paralysis / syncope / seizure / dysphagia / headache / paresthesias  Musculoskeletal:  +muscle weakness /joint stiffness /+ pain  Vascular: Denies edema / claudication / varicosities  Heme / endocrine: Denies easy bruising / bleeding / excessive sweating / heat or cold intolerance  Psychiatric:  Denies depression / anxiety / insomnia / mood changes  Skin:  Denies new rashes / lesions / skin hair or nail changes    14 point review of systems is negative except as specifically addressed above. Objective:   VITALS:  BP (!) 112/56   Pulse 77   Temp 97.4 °F (36.3 °C)   Resp 16   Ht 5' 11\" (1.803 m)   Wt 184 lb 6 oz (83.6 kg)   SpO2 94%   BMI 25.72 kg/m²   24HR INTAKE/OUTPUT:      Intake/Output Summary (Last 24 hours) at 12/04/17 0859  Last data filed at 12/04/17 0617   Gross per 24 hour   Intake             2366 ml   Output             1190 ml   Net             1176 ml     General appearance: 69year old male resting comfortably in bed, no acute distress  Head: Normocephalic, without obvious abnormality, atraumatic  Eyes: conjunctivae/corneas clear. PERRL, EOM's intact. Ears: normal external ears and nose, throat without exudate  Neck: no adenopathy, no carotid bruit, no JVD, supple, symmetrical, trachea midline and thyroid not enlarged, symmetric, no tenderness/mass/nodules  Lungs: Diminished at bilateral bases otherwise CTAB  Heart: Normal prosthetic heart tones, S1, S2, no chest wall tenderness  Abdomen:soft, non-tender; non-distended, normal bowel sounds no masses, no organomegaly  Extremities:s/p right BKA, LLE without edema or tenderness to palpation  Skin: Skin color, texture, turgor normal. No rashes or lesions  Lymphatic: No palpable lymph node enlargment  Neurologic: Alert and oriented X 3, generalized weakness, normal tone. Normal symmetric reflexes.   Cranial nerves:II-XII Grossly intact Sensory: normal Motor:grossly normal  Psychiatric: Alert and oriented, thought content appropriate, normal insight, mood appropriate    Medications:      sodium chloride 75 mL/hr at 12/03/17 2126    dextrose        meropenem  1 g Intravenous Q8H    vancomycin (VANCOCIN) intermittent dosing (placeholder)   Other RX Placeholder    metroNIDAZOLE  500 mg Intravenous Q8H    pantoprazole  40 mg Oral QAM AC    lactulose  20 g Oral Daily    enoxaparin  1 mg/kg Subcutaneous BID  insulin glargine  47 Units Subcutaneous Nightly    sodium chloride flush  10 mL Intravenous 2 times per day    allopurinol  100 mg Oral BID    aspirin  81 mg Oral Daily    atorvastatin  40 mg Oral Nightly    FLUoxetine  20 mg Oral Nightly    gabapentin  300 mg Oral BID    guaiFENesin  1,200 mg Oral BID    latanoprost  1 drop Both Eyes Nightly    levothyroxine  75 mcg Oral Daily    metoprolol succinate  25 mg Oral Daily    tamsulosin  0.4 mg Oral Daily    tiotropium  18 mcg Inhalation Daily    traZODone  100 mg Oral Nightly    insulin lispro  0-18 Units Subcutaneous TID WC    insulin lispro  0-9 Units Subcutaneous Nightly     calcium carbonate, acetaminophen, HYDROcodone 5 mg - acetaminophen **OR** HYDROcodone 5 mg - acetaminophen, ondansetron, ondansetron, morphine, sodium chloride flush, magnesium hydroxide, potassium chloride **OR** potassium chloride **OR** potassium chloride, glucose, dextrose, glucagon (rDNA), dextrose  DIET CARB CONTROL; Carb Control: 4 carbs/meal (approximate 1800 kcals/day)     Lab and other Data:     Recent Labs      12/02/17   1751  12/03/17   0328  12/04/17   0406   WBC  11.6*  13.5*  12.0*   HGB  7.8*  6.9*  7.7*   PLT  221  209  201     Recent Labs      12/02/17   0528  12/02/17   1344  12/03/17   0328  12/04/17   0406   NA  132*   --   132*  139   K  5.5*  4.5  4.7  3.7   CL  95*   --   96*  101   CO2  30*   --   29  29   BUN  33*   --   37*  27*   CREATININE  1.1   --   1.3*  0.9   GLUCOSE  303*   --   119*  80     Recent Labs      12/02/17   0528  12/03/17   0328  12/04/17   0406   AST  14  19  25   ALT  12  12  13   BILITOT  <0.2  <0.2  0.3   ALKPHOS  94  88  89     INR:   Recent Labs      12/02/17   0528  12/03/17   0328  12/04/17   0406   INR  1.19*  1.28*  1.16     RAD:   CT Abdomen/Pelvis 12/03/2017  1. Mild mucosal thickening involving the colon and rectum compatible  with mild acute colitis/proctitis.  There is mild fluid retention  within the colon and no blood loss anemia-improved, received PRBC 12/03/2017    Hypotension-monitor, continue to hold BP medications    Diarrhea-C diff neg, Flagyl started for possible Colitis 12/03/2017    Hyperkalemia-resolved    Resume coumadin when ok with attending. Can DC antibiotics at discharge-hopefully to inpatient rehab. Antibiotic: Merrem, Vancomycin    DVT Prophylaxis: Lovenox    Danielle Aj PA-C       Patient was examined independently. Chart reviewed and events noted. No new complaints other than stated above. Vitals noted. Chest CTAB. S1 S2 RRR. GI bening. Neuro no new deficits. Integumentary right leg on brace    Labs noted. Agree with above assessment and plan. Will continue to follow.     Christine Bonner MD  Hospitalist service  12/04/17

## 2017-12-04 NOTE — PROGRESS NOTES
Occupational Therapy   Occupational Therapy Initial Assessment  Date: 2017   Patient Name: Sae Bledsoe  MRN: [id number]     : 1950    Patient Diagnosis(es): There were no encounter diagnoses. has a past medical history of Acute kidney failure ([facility] Benign prostatic hyperplasia without urinary obstruction; CAD (coronary artery disease); CHF (congestive heart failure) (HCC); CHF (congestive heart failure) ([facility] Chronic kidney disease (CKD); Cirrhosis of liver ([facility] COPD (chronic obstructive pulmonary disease) ([facility] Diabetes mellitus ([facility] Emphysema lung ([facility] Glaucoma; Gout; History of obstructive sleep apnea; Hyperlipidemia; Hypertension; Hypothyroidism; Muscle weakness; Myocardial infarction; Occlusion and stenosis of left carotid artery; Personal history of pulmonary embolism; and Psychiatric problem. has a past surgical history that includes Cardiac catheterization (14  [facility]); Cardiac catheterization (14  [facility]); Coronary angioplasty with stent (14  [facility]); Aortic valve replacement (2015); Leg amputation below knee (Right, 2017); and AMPUTATION ABOVE KNEE (Right, 2017).     Treatment Diagnosis: R BKA      Restrictions       Subjective   General  Chart Reviewed: Yes  Patient assessed for rehabilitation services?: Yes  Additional Pertinent Hx: R BKA 17, R stump revision 17  Family / Caregiver Present: No  Diagnosis: R BKA  General Comment  Comments: Pt. ready to get up OOB  Pain Assessment  Patient Currently in Pain: Yes  Pain Assessment: 0-10  Pain Level: 5  Pain Type: Surgical pain  Pain Location: Leg  Pain Orientation: Right  Pain Descriptors: Constant, Burning  Pain Frequency: Continuous  Clinical Progression: Gradually improving  Pain Intervention(s): Medication (see eMar), Other (Comment) (prn Norco given)  Response to Pain Intervention: Asleep with RR greater than 10  Oxygen Therapy  SpO2: 96 %  Social/Functional History  Social/Functional toilet tfers  Short term goal 2: Supervision with  clothing mgmt in standing  Short term goal 3: Supervision with seated LB dsg.   Long term goals  Long term goal 1: Return to PLOF       Therapy Time   Individual Concurrent Group Co-treatment   Time In           Time Out           Minutes                   Marcie Tuttle, OTR/L

## 2017-12-04 NOTE — PROGRESS NOTES
Chair: Minimal assistance  Ambulation  Ambulation?: Yes  Ambulation 1  Surface: level tile  Device: Rolling Walker  Assistance: Minimal assistance  Quality of Gait: Unsteady due to decreased balance  Distance: 4 steps to chair     Balance  Posture: Fair  Sitting - Static: Good  Sitting - Dynamic: Good  Standing - Static: Fair;-  Standing - Dynamic: -;Fair        Assessment   Body structures, Functions, Activity limitations: Decreased functional mobility   Treatment Diagnosis: Right BKA  Prognosis: Good  Decision Making: Low Complexity  REQUIRES PT FOLLOW UP: Yes  Activity Tolerance  Activity Tolerance: Patient Tolerated treatment well     Discharge Recommendations:         Plan   Plan  Times per week: 7  Times per day: Daily  Plan weeks: 2  Current Treatment Recommendations: Strengthening, Functional Mobility Training, Equipment Evaluation, Education, & procurement, Transfer Training, Gait Training    G-Code  PT G-Codes  Functional Assessment Tool Used: Bed to chair transfer  Score: Minimum assist  Functional Limitation: Mobility: Walking and moving around  Mobility: Walking and Moving Around Current Status (): At least 1 percent but less than 20 percent impaired, limited or restricted  Mobility: Walking and Moving Around Goal Status ():  At least 1 percent but less than 20 percent impaired, limited or restricted  OutComes Score                                           Goals  Short term goals  Time Frame for Short term goals: 2 weeks  Short term goal 1: Transfer sit <> stand independently  Short term goal 2: Ambulate 200 feet independently with assistive device  Short term goal 3: Transfe bed <> chair independently       Therapy Time   Individual Concurrent Group Co-treatment   Time In           Time Out           Minutes                   Marie Cobain PT       Electronically signed by Marie Cobian PT on 12/4/2017 at 11:59 AM

## 2017-12-04 NOTE — PROGRESS NOTES
active bleeding        []  bleeding risk        []  GI bleed        []  hemorrhage        []  patient refusal        []  risk of bleeding         []  thrombocytopenia        []  supratherapeutic INR    Beta Blocker:  continued        Patient seen on rounds this am with

## 2017-12-04 NOTE — CARE COORDINATION
The [address] at [facility]  Notification of Admission Decision      [] Patient has been accepted for admit to [facility] on :       Please write discharge orders and summary prior to discharge. [] Patient acceptance to Rehab pending the following :    [x] Eval in progress : patient wishes to discuss with his wife before deciding on Rehab v/s SNF. He asks that I come back tomorrow. [] Patient determined to be ineligible for services at [facility] because : We recommend you consider        Thank you for your referral, we appreciate you.     Electronically Signed by Maddie Menezes, Admissions Coordinator 12/4/2017 1:02 PM

## 2017-12-04 NOTE — PROGRESS NOTES
Vascular Surgery Rounding Note                                                     Dr.Timothy Skinner    12/4/2017  2:58 PM  Antibiotic: Merrem 1gm IVPB IV every 8 hours                   Flagyl 500mg IVPB IV every 8 hours                   Vancomycin per pharmacy dosing    Post op day - #3  OPERATION PERFORMED:  Right below the knee amputation. Subjective- stating right leg really sore    Objective-   Invalid input(s): 24H    Intake/Output Summary (Last 24 hours) at 12/04/17 1458  Last data filed at 12/04/17 1326   Gross per 24 hour   Intake             2401 ml   Output             1190 ml   Net             1211 ml     In: 840 [P.O.:840]  Out: -     Physical Exam:  Awake, alert, appropriate No  BP (!) 116/51   Pulse 70   Temp 97.6 °F (36.4 °C)   Resp 16   Ht 5' 11\" (1.803 m)   Wt 184 lb 6 oz (83.6 kg)   SpO2 96%   BMI 25.72 kg/m²   Heart- regular rate and rhythm, valve click noted  Lung-clear bilateral anterior, diminished lower lobe sounds  Neck-supple, symmetrical, trachea midline  Abdomen-Abdomen soft, non-tender. BS normal.  Extremities-right BKA incision line well approximated with sutures intact, small amount of drainage noted on old dressings  Neurologic- alert, oriented, normal speech, no focal findings or movement disorder noted  Wound surgical incisions-BKA with sutures intact    Assessment/Plan: 1. FloTech protector for right BKA  2. PT/OT/Rehab consult  3. Antibiotics continued after surgery due to:   Infection -  []  Abscess    []  Pneumonia    []  Aspiration Pneumonia    []  Sepsis    []  Cellulitis    []  Positive culture    []  UTI    []  Osteomyelitis    []  Fungal infection    []  H.pylori    DVT prophylaxis:Full anticoagulation-Valve         No mechanical prophylaxis due to:   []  bilateral amputee  []  bilateral lower extremity trauma        []  patient refusal        []  continuing IV heparin         No pharmacologic prophylaxis due to active bleeding        []  bleeding risk        []  GI bleed        []  hemorrhage        []  patient refusal        []  risk of bleeding         []  thrombocytopenia        []  supratherapeutic INR    Beta Blocker:  continued        Patient seen on rounds this am with

## 2017-12-05 ENCOUNTER — APPOINTMENT (OUTPATIENT)
Dept: GENERAL RADIOLOGY | Age: 67
DRG: 240 | End: 2017-12-05
Attending: SURGERY
Payer: COMMERCIAL

## 2017-12-05 PROBLEM — L97.313 NON-PRESSURE CHRONIC ULCER OF RIGHT ANKLE WITH NECROSIS OF MUSCLE (HCC): Chronic | Status: RESOLVED | Noted: 2017-11-27 | Resolved: 2017-12-05

## 2017-12-05 PROBLEM — B99.9 INFECTION: Status: RESOLVED | Noted: 2017-11-27 | Resolved: 2017-12-05

## 2017-12-05 LAB
GLUCOSE BLD-MCNC: 155 MG/DL (ref 70–99)
GLUCOSE BLD-MCNC: 156 MG/DL (ref 70–99)
GLUCOSE BLD-MCNC: 160 MG/DL (ref 70–99)
GLUCOSE BLD-MCNC: 176 MG/DL (ref 70–99)
HCT VFR BLD CALC: 23.4 % (ref 42–52)
HEMOGLOBIN: 7.2 G/DL (ref 14–18)
INR BLD: 1.18 (ref 0.88–1.18)
MCH RBC QN AUTO: 27.5 PG (ref 27–31)
MCHC RBC AUTO-ENTMCNC: 30.8 G/DL (ref 33–37)
MCV RBC AUTO: 89.3 FL (ref 80–94)
PDW BLD-RTO: 16.6 % (ref 11.5–14.5)
PERFORMED ON: ABNORMAL
PLATELET # BLD: 207 K/UL (ref 130–400)
PMV BLD AUTO: 10.1 FL (ref 9.4–12.4)
PROTHROMBIN TIME: 15 SEC (ref 12–14.6)
RBC # BLD: 2.62 M/UL (ref 4.7–6.1)
WBC # BLD: 7.8 K/UL (ref 4.8–10.8)

## 2017-12-05 PROCEDURE — 99239 HOSP IP/OBS DSCHRG MGMT >30: CPT | Performed by: FAMILY MEDICINE

## 2017-12-05 PROCEDURE — 71010 XR CHEST PORTABLE: CPT

## 2017-12-05 PROCEDURE — 36415 COLL VENOUS BLD VENIPUNCTURE: CPT

## 2017-12-05 PROCEDURE — 99024 POSTOP FOLLOW-UP VISIT: CPT | Performed by: SURGERY

## 2017-12-05 PROCEDURE — 97110 THERAPEUTIC EXERCISES: CPT

## 2017-12-05 PROCEDURE — 80202 ASSAY OF VANCOMYCIN: CPT

## 2017-12-05 PROCEDURE — 6360000002 HC RX W HCPCS: Performed by: SURGERY

## 2017-12-05 PROCEDURE — 85027 COMPLETE CBC AUTOMATED: CPT

## 2017-12-05 PROCEDURE — 2580000003 HC RX 258: Performed by: SURGERY

## 2017-12-05 PROCEDURE — 2580000003 HC RX 258: Performed by: PHYSICIAN ASSISTANT

## 2017-12-05 PROCEDURE — 6370000000 HC RX 637 (ALT 250 FOR IP): Performed by: SURGERY

## 2017-12-05 PROCEDURE — 6360000002 HC RX W HCPCS: Performed by: HOSPITALIST

## 2017-12-05 PROCEDURE — 6370000000 HC RX 637 (ALT 250 FOR IP): Performed by: INTERNAL MEDICINE

## 2017-12-05 PROCEDURE — 85610 PROTHROMBIN TIME: CPT

## 2017-12-05 PROCEDURE — 6370000000 HC RX 637 (ALT 250 FOR IP): Performed by: NURSE PRACTITIONER

## 2017-12-05 PROCEDURE — 2700000000 HC OXYGEN THERAPY PER DAY

## 2017-12-05 PROCEDURE — 6360000002 HC RX W HCPCS: Performed by: PHYSICIAN ASSISTANT

## 2017-12-05 PROCEDURE — P9016 RBC LEUKOCYTES REDUCED: HCPCS

## 2017-12-05 PROCEDURE — 82948 REAGENT STRIP/BLOOD GLUCOSE: CPT

## 2017-12-05 PROCEDURE — 2500000003 HC RX 250 WO HCPCS: Performed by: INTERNAL MEDICINE

## 2017-12-05 PROCEDURE — 1210000000 HC MED SURG R&B

## 2017-12-05 RX ORDER — INSULIN GLARGINE 100 [IU]/ML
47 INJECTION, SOLUTION SUBCUTANEOUS NIGHTLY
Status: CANCELLED | OUTPATIENT
Start: 2017-12-05

## 2017-12-05 RX ORDER — ATORVASTATIN CALCIUM 20 MG/1
40 TABLET, FILM COATED ORAL NIGHTLY
Status: CANCELLED | OUTPATIENT
Start: 2017-12-05

## 2017-12-05 RX ORDER — WARFARIN SODIUM 5 MG/1
5 TABLET ORAL DAILY
Status: CANCELLED | OUTPATIENT
Start: 2017-12-06 | End: 2018-01-03

## 2017-12-05 RX ORDER — FLUOXETINE HYDROCHLORIDE 20 MG/1
20 CAPSULE ORAL NIGHTLY
Status: CANCELLED | OUTPATIENT
Start: 2017-12-05

## 2017-12-05 RX ORDER — GUAIFENESIN 600 MG/1
1200 TABLET, EXTENDED RELEASE ORAL 2 TIMES DAILY
Status: CANCELLED | OUTPATIENT
Start: 2017-12-05

## 2017-12-05 RX ORDER — NICOTINE POLACRILEX 4 MG
15 LOZENGE BUCCAL PRN
Status: CANCELLED | OUTPATIENT
Start: 2017-12-05

## 2017-12-05 RX ORDER — LEVOTHYROXINE SODIUM 0.07 MG/1
75 TABLET ORAL DAILY
Status: CANCELLED | OUTPATIENT
Start: 2017-12-06

## 2017-12-05 RX ORDER — ALLOPURINOL 100 MG/1
100 TABLET ORAL 2 TIMES DAILY
Status: CANCELLED | OUTPATIENT
Start: 2017-12-05

## 2017-12-05 RX ORDER — GABAPENTIN 300 MG/1
300 CAPSULE ORAL 2 TIMES DAILY
Status: CANCELLED | OUTPATIENT
Start: 2017-12-05

## 2017-12-05 RX ORDER — ASPIRIN 81 MG/1
81 TABLET, CHEWABLE ORAL DAILY
Status: CANCELLED | OUTPATIENT
Start: 2017-12-06

## 2017-12-05 RX ORDER — ONDANSETRON 2 MG/ML
4 INJECTION INTRAMUSCULAR; INTRAVENOUS EVERY 6 HOURS PRN
Status: CANCELLED | OUTPATIENT
Start: 2017-12-05

## 2017-12-05 RX ORDER — HYDROCODONE BITARTRATE AND ACETAMINOPHEN 5; 325 MG/1; MG/1
1 TABLET ORAL EVERY 4 HOURS PRN
Status: CANCELLED | OUTPATIENT
Start: 2017-12-05

## 2017-12-05 RX ORDER — LACTULOSE 10 G/15ML
20 SOLUTION ORAL DAILY
Status: CANCELLED | OUTPATIENT
Start: 2017-12-06

## 2017-12-05 RX ORDER — TAMSULOSIN HYDROCHLORIDE 0.4 MG/1
0.4 CAPSULE ORAL DAILY
Status: CANCELLED | OUTPATIENT
Start: 2017-12-06

## 2017-12-05 RX ORDER — LATANOPROST 50 UG/ML
1 SOLUTION/ DROPS OPHTHALMIC NIGHTLY
Status: CANCELLED | OUTPATIENT
Start: 2017-12-05

## 2017-12-05 RX ORDER — TRAZODONE HYDROCHLORIDE 50 MG/1
100 TABLET ORAL NIGHTLY
Status: CANCELLED | OUTPATIENT
Start: 2017-12-05

## 2017-12-05 RX ORDER — CALCIUM CARBONATE 200(500)MG
500 TABLET,CHEWABLE ORAL EVERY 4 HOURS PRN
Status: CANCELLED | OUTPATIENT
Start: 2017-12-05

## 2017-12-05 RX ORDER — METOPROLOL SUCCINATE 25 MG/1
25 TABLET, EXTENDED RELEASE ORAL DAILY
Status: CANCELLED | OUTPATIENT
Start: 2017-12-06

## 2017-12-05 RX ORDER — PANTOPRAZOLE SODIUM 40 MG/1
40 TABLET, DELAYED RELEASE ORAL
Status: CANCELLED | OUTPATIENT
Start: 2017-12-06

## 2017-12-05 RX ORDER — HYDROCODONE BITARTRATE AND ACETAMINOPHEN 5; 325 MG/1; MG/1
2 TABLET ORAL EVERY 4 HOURS PRN
Status: CANCELLED | OUTPATIENT
Start: 2017-12-05

## 2017-12-05 RX ORDER — ACETAMINOPHEN 325 MG/1
650 TABLET ORAL EVERY 4 HOURS PRN
Status: CANCELLED | OUTPATIENT
Start: 2017-12-05

## 2017-12-05 RX ADMIN — INSULIN LISPRO 2 UNITS: 100 INJECTION, SOLUTION INTRAVENOUS; SUBCUTANEOUS at 20:47

## 2017-12-05 RX ADMIN — WARFARIN SODIUM 5 MG: 5 TABLET ORAL at 17:59

## 2017-12-05 RX ADMIN — INSULIN LISPRO 3 UNITS: 100 INJECTION, SOLUTION INTRAVENOUS; SUBCUTANEOUS at 13:17

## 2017-12-05 RX ADMIN — METOPROLOL SUCCINATE 25 MG: 25 TABLET, EXTENDED RELEASE ORAL at 08:11

## 2017-12-05 RX ADMIN — LATANOPROST 1 DROP: 50 SOLUTION OPHTHALMIC at 01:09

## 2017-12-05 RX ADMIN — METRONIDAZOLE 500 MG: 500 INJECTION, SOLUTION INTRAVENOUS at 15:31

## 2017-12-05 RX ADMIN — MEROPENEM 1 G: 1 INJECTION, POWDER, FOR SOLUTION INTRAVENOUS at 11:36

## 2017-12-05 RX ADMIN — INSULIN LISPRO 3 UNITS: 100 INJECTION, SOLUTION INTRAVENOUS; SUBCUTANEOUS at 08:16

## 2017-12-05 RX ADMIN — PANTOPRAZOLE SODIUM 40 MG: 40 TABLET, DELAYED RELEASE ORAL at 11:49

## 2017-12-05 RX ADMIN — METRONIDAZOLE 500 MG: 500 INJECTION, SOLUTION INTRAVENOUS at 08:10

## 2017-12-05 RX ADMIN — HYDROCODONE BITARTRATE AND ACETAMINOPHEN 2 TABLET: 5; 325 TABLET ORAL at 13:20

## 2017-12-05 RX ADMIN — METRONIDAZOLE 500 MG: 500 INJECTION, SOLUTION INTRAVENOUS at 03:46

## 2017-12-05 RX ADMIN — MORPHINE SULFATE 2 MG: 4 INJECTION, SOLUTION INTRAMUSCULAR; INTRAVENOUS at 20:33

## 2017-12-05 RX ADMIN — Medication 10 ML: at 01:08

## 2017-12-05 RX ADMIN — ASPIRIN 81 MG CHEWABLE TABLET 81 MG: 81 TABLET CHEWABLE at 08:11

## 2017-12-05 RX ADMIN — HYDROCODONE BITARTRATE AND ACETAMINOPHEN 2 TABLET: 5; 325 TABLET ORAL at 17:59

## 2017-12-05 RX ADMIN — TIOTROPIUM BROMIDE 18 MCG: 18 CAPSULE ORAL; RESPIRATORY (INHALATION) at 08:11

## 2017-12-05 RX ADMIN — ENOXAPARIN SODIUM 80 MG: 80 INJECTION SUBCUTANEOUS at 20:32

## 2017-12-05 RX ADMIN — TRAZODONE HYDROCHLORIDE 100 MG: 100 TABLET ORAL at 20:33

## 2017-12-05 RX ADMIN — MORPHINE SULFATE 2 MG: 4 INJECTION, SOLUTION INTRAMUSCULAR; INTRAVENOUS at 16:23

## 2017-12-05 RX ADMIN — INSULIN GLARGINE 47 UNITS: 100 INJECTION, SOLUTION SUBCUTANEOUS at 20:40

## 2017-12-05 RX ADMIN — INSULIN LISPRO 3 UNITS: 100 INJECTION, SOLUTION INTRAVENOUS; SUBCUTANEOUS at 18:00

## 2017-12-05 RX ADMIN — VANCOMYCIN HYDROCHLORIDE 1250 MG: 1 INJECTION, POWDER, LYOPHILIZED, FOR SOLUTION INTRAVENOUS at 16:24

## 2017-12-05 RX ADMIN — GUAIFENESIN 1200 MG: 600 TABLET, EXTENDED RELEASE ORAL at 08:11

## 2017-12-05 RX ADMIN — GABAPENTIN 300 MG: 300 CAPSULE ORAL at 20:33

## 2017-12-05 RX ADMIN — ATORVASTATIN CALCIUM 40 MG: 40 TABLET, FILM COATED ORAL at 20:33

## 2017-12-05 RX ADMIN — Medication 10 ML: at 20:50

## 2017-12-05 RX ADMIN — Medication 10 ML: at 08:12

## 2017-12-05 RX ADMIN — LATANOPROST 1 DROP: 50 SOLUTION OPHTHALMIC at 20:39

## 2017-12-05 RX ADMIN — ENOXAPARIN SODIUM 80 MG: 80 INJECTION SUBCUTANEOUS at 11:37

## 2017-12-05 RX ADMIN — MEROPENEM 1 G: 1 INJECTION, POWDER, FOR SOLUTION INTRAVENOUS at 16:24

## 2017-12-05 RX ADMIN — MEROPENEM 1 G: 1 INJECTION, POWDER, FOR SOLUTION INTRAVENOUS at 01:08

## 2017-12-05 RX ADMIN — HYDROCODONE BITARTRATE AND ACETAMINOPHEN 2 TABLET: 5; 325 TABLET ORAL at 06:41

## 2017-12-05 RX ADMIN — GUAIFENESIN 1200 MG: 600 TABLET, EXTENDED RELEASE ORAL at 20:32

## 2017-12-05 RX ADMIN — ALLOPURINOL 100 MG: 100 TABLET ORAL at 08:24

## 2017-12-05 RX ADMIN — ALLOPURINOL 100 MG: 100 TABLET ORAL at 20:32

## 2017-12-05 RX ADMIN — GABAPENTIN 300 MG: 300 CAPSULE ORAL at 08:11

## 2017-12-05 RX ADMIN — LEVOTHYROXINE SODIUM 75 MCG: 75 TABLET ORAL at 11:37

## 2017-12-05 RX ADMIN — TAMSULOSIN HYDROCHLORIDE 0.4 MG: 0.4 CAPSULE ORAL at 08:11

## 2017-12-05 RX ADMIN — MORPHINE SULFATE 2 MG: 4 INJECTION, SOLUTION INTRAMUSCULAR; INTRAVENOUS at 03:03

## 2017-12-05 RX ADMIN — HYDROCODONE BITARTRATE AND ACETAMINOPHEN 2 TABLET: 5; 325 TABLET ORAL at 01:11

## 2017-12-05 RX ADMIN — FLUOXETINE 20 MG: 20 CAPSULE ORAL at 20:33

## 2017-12-05 ASSESSMENT — PAIN SCALES - GENERAL
PAINLEVEL_OUTOF10: 5
PAINLEVEL_OUTOF10: 5
PAINLEVEL_OUTOF10: 8
PAINLEVEL_OUTOF10: 3
PAINLEVEL_OUTOF10: 7
PAINLEVEL_OUTOF10: 6
PAINLEVEL_OUTOF10: 7
PAINLEVEL_OUTOF10: 5

## 2017-12-05 ASSESSMENT — PAIN DESCRIPTION - PAIN TYPE: TYPE: PHANTOM PAIN

## 2017-12-05 ASSESSMENT — PAIN DESCRIPTION - LOCATION: LOCATION: LEG

## 2017-12-05 ASSESSMENT — PAIN DESCRIPTION - ORIENTATION: ORIENTATION: RIGHT

## 2017-12-05 NOTE — PROGRESS NOTES
Vascular Surgery Rounding Note                                                     Dr.Timothy Skinner    12/5/2017  3:38 PM  Antibiotic: Merrem 1gm IVPB IV every 8 hours                   Flagyl 500mg IVPB IV every 8 hours                   Vancomycin per pharmacy dosing    Post op day - #7- open amp of right leg   Post op day -#4-   Right below the knee amputation. Subjective- stating right leg still sore but the protector feels good on it    Objective-   Invalid input(s): 24H    Intake/Output Summary (Last 24 hours) at 12/05/17 1538  Last data filed at 12/05/17 1523   Gross per 24 hour   Intake             1170 ml   Output              400 ml   Net              770 ml     In: 690 [I.V.:690]  Out: -     Physical Exam:  Awake, alert, appropriate No  BP (!) 104/49   Pulse 72   Temp 98.2 °F (36.8 °C)   Resp 18   Ht 5' 11\" (1.803 m)   Wt 187 lb 9.6 oz (85.1 kg)   SpO2 94%   BMI 26.16 kg/m²   Heart- regular rate and rhythm, valve click noted  Lung-clear bilateral anterior, diminished lower lobe sounds  Neck-supple, symmetrical, trachea midline  Abdomen-Abdomen soft, non-tender. BS normal.  Extremities-right BKA incision line well approximated with sutures intact, small amount of drainage noted on old dressings  Neurologic- alert, oriented, normal speech, no focal findings or movement disorder noted  Wound surgical incisions-BKA with sutures intact, CoFlex dressing CDI. Wound looks excellent, knee has good mobility    Assessment/Plan: 1. FloTech protector for right BKA  2. PT/OT. Waiting for insurance to precert Rehab   3. Stop antibiotic in next 48 hours   4. Warfarin resumed- PT 15/INR 1.18  Antibiotics continued after surgery due to:   Infection -  [x]  Abscess    []  Pneumonia    []  Aspiration Pneumonia    []  Sepsis    [x]  Cellulitis    []  Positive culture    []  UTI    []  Osteomyelitis    []  Fungal infection    []  H.pylori    DVT prophylaxis:Full

## 2017-12-05 NOTE — PROGRESS NOTES
nail changes    14 point review of systems is negative except as specifically addressed above. Objective:   VITALS:  BP (!) 96/47   Pulse 85   Temp 97.5 °F (36.4 °C)   Resp 16   Ht 5' 11\" (1.803 m)   Wt 187 lb 9.6 oz (85.1 kg)   SpO2 97%   BMI 26.16 kg/m²   24HR INTAKE/OUTPUT:      Intake/Output Summary (Last 24 hours) at 12/05/17 1128  Last data filed at 12/05/17 3632   Gross per 24 hour   Intake              960 ml   Output              400 ml   Net              560 ml     General appearance: 69year old male resting comfortably in bed, no acute distress, eating breakfast  Head: Normocephalic, without obvious abnormality, atraumatic  Eyes: conjunctivae/corneas clear. PERRL, EOM's intact. Ears: normal external ears and nose, throat without exudate  Neck: no adenopathy, no carotid bruit, no JVD, supple, symmetrical, trachea midline and thyroid not enlarged, symmetric, no tenderness/mass/nodules  Lungs:Diminished right lower lobe otherwise clear to auscultation without wheezes, rales, rhonchi  Heart: Normal prosthetic heart tones, S1, S2, no chest wall tenderness  Abdomen:soft, non-tender; mildly distended, normal bowel sounds no masses, no organomegaly  Extremities:s/p right BKA, LLE without edema or tenderness to palpation  Skin: Skin color, texture, turgor normal. No rashes or lesions  Lymphatic: No palpable lymph node enlargment  Neurologic: Alert and oriented X 3, generalized weakness, normal tone. Normal symmetric reflexes.   Cranial nerves:II-XII Grossly intact Sensory: normal Motor:grossly normal  Psychiatric: Pleasant, Alert and oriented, thought content appropriate, normal insight, mood appropriate    Medications:      sodium chloride 75 mL/hr at 12/03/17 2126    dextrose        warfarin  5 mg Oral Daily    warfarin (COUMADIN) daily dosing (placeholder)   Other RX Placeholder    vancomycin  1,250 mg Intravenous Q18H    meropenem  1 g Intravenous Q8H    vancomycin (VANCOCIN) intermittent dosing (placeholder)   Other RX Placeholder    metroNIDAZOLE  500 mg Intravenous Q8H    pantoprazole  40 mg Oral QAM AC    lactulose  20 g Oral Daily    enoxaparin  1 mg/kg Subcutaneous BID    insulin glargine  47 Units Subcutaneous Nightly    sodium chloride flush  10 mL Intravenous 2 times per day    allopurinol  100 mg Oral BID    aspirin  81 mg Oral Daily    atorvastatin  40 mg Oral Nightly    FLUoxetine  20 mg Oral Nightly    gabapentin  300 mg Oral BID    guaiFENesin  1,200 mg Oral BID    latanoprost  1 drop Both Eyes Nightly    levothyroxine  75 mcg Oral Daily    metoprolol succinate  25 mg Oral Daily    tamsulosin  0.4 mg Oral Daily    tiotropium  18 mcg Inhalation Daily    traZODone  100 mg Oral Nightly    insulin lispro  0-18 Units Subcutaneous TID WC    insulin lispro  0-9 Units Subcutaneous Nightly     calcium carbonate, acetaminophen, HYDROcodone 5 mg - acetaminophen **OR** HYDROcodone 5 mg - acetaminophen, ondansetron, ondansetron, morphine, sodium chloride flush, magnesium hydroxide, potassium chloride **OR** potassium chloride **OR** potassium chloride, glucose, dextrose, glucagon (rDNA), dextrose  DIET CARB CONTROL; Carb Control: 4 carbs/meal (approximate 1800 kcals/day)     Lab and other Data:     Recent Labs      12/03/17   0328  12/04/17   0406  12/05/17   0425   WBC  13.5*  12.0*  7.8   HGB  6.9*  7.7*  7.2*   PLT  209  201  207     Recent Labs      12/02/17   1344  12/03/17   0328  12/04/17   0406   NA   --   132*  139   K  4.5  4.7  3.7   CL   --   96*  101   CO2   --   29  29   BUN   --   37*  27*   CREATININE   --   1.3*  0.9   GLUCOSE   --   119*  80     Recent Labs      12/03/17   0328  12/04/17   0406   AST  19  25   ALT  12  13   BILITOT  <0.2  0.3   ALKPHOS  88  89     INR:   Recent Labs      12/03/17   0328  12/04/17   0406  12/05/17   0425   INR  1.28*  1.16  1.18     RAD:   CT Abdomen/Pelvis 12/03/2017  1.  Mild mucosal thickening involving the colon and rectum Lactulose continued but decreased on 12/02/2017 for continued diarrhea, Ammonia WNL, stable    Acute blood loss anemia-improved, received PRBC 12/03/2017, continue daily labs, suspect will need another transfusion    Hypotension-monitor, continue to hold BP medications    Diarrhea-C diff neg, Flagyl started for possible Colitis 12/03/2017, diarrhea improved    Hyperkalemia-resolved    Dyspnea-has diminished air exchange RLL, check CXR    Can likely DC abx when ok with Attending, check CXR.  Can DC to rehab    Antibiotic: Merrem, Vancomycin    DVT Prophylaxis: Leela Olivera PA-C

## 2017-12-05 NOTE — DISCHARGE SUMMARY
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 (HCC)-s/p right BKA w/ revision 2017     Active Problems:    History of heart valve replacement with mechanical valve-Coumadin resumed, continue Lovenox until INR therapeutic    Infection    Type 2 diabetes mellitus with foot ulcer, with long-term current use of insulin (HCC)-SSI, lantus    Acquired hypothyroidism-synthroid continued    Hypotension    Mixed hyperlipidemia    Glaucoma    Panlobular emphysema (HCC)    Stage 3 chronic kidney disease-stable    Cirrhosis of liver ([facility]    Acute blood loss as cause of postoperative anemia-PRBC as needed for hgb <7.0    Diarrhea-C diff neg, likely 2' lactulose, merrem. Suspect improvement with discontinuation of abx    Significant Diagnostic Studies:   CT Abdomen/Pelvis 2017  1. Mild mucosal thickening involving the colon and rectum compatible  with mild acute colitis/proctitis. There is mild fluid retention  within the colon and no obstruction is seen. 2. The appendix is normal.  3. Cirrhotic changes of the liver with splenomegaly and probable  portal hypertension. This may account for the slightly increased  attenuation of fat planes along the celiac axis. 4. Small right pleural effusion and bibasilar atelectasis, there is a  7.3 cm round mass in the right lower lobe probably related to rounded  atelectasis. Follow-up is recommended. 5. Mild distention of the gallbladder with layering sludge and stones  dependently, no evidence of acute cholecystitis. 6. 2.3 cm fat-containing periumbilical hernia.     Right tib/fib 2017  1. Soft tissue gas within the mid and distal calf posteriorly.  It is  difficult to Recommend monitoring CBC as well as blood pressure. His diuretics will need to be resumed slowly given tendency to become volume overloaded. Physical Exam:  Vital Signs: BP (!) 104/49   Pulse 72   Temp 98.2 °F (36.8 °C)   Resp 18   Ht 5' 11\" (1.803 m)   Wt 187 lb 9.6 oz (85.1 kg)   SpO2 94%   BMI 26.16 kg/m²   General appearance:. Alert and Cooperative   HEENT: Normocephalic. Chest: clear to auscultation bilaterally without wheezes or rhonchi. Cardiac: Normal heart tones with regular rate and rhythm, S1, S2 normal. No murmurs, gallops, or rubs auscultated. Abdomen:soft, non-tender; mildly distended normal bowel sounds no masses, no organomegaly. Extremities: No clubbing or cyanosis. No peripheral edema. Peripheral pulses palpable. Neurologic: Grossly intact.     Discharge Medications:       Burtstephan Kat   Home Medication Instructions LET:[id number]    Printed on:12/05/17 5459   Medication Information                      acetaminophen (TYLENOL) 500 MG tablet  Take 500 mg by mouth every 6 hours as needed for Pain             albuterol sulfate  (90 Base) MCG/ACT inhaler  Inhale 2 puffs into the lungs every 6 hours as needed for Wheezing             allopurinol (ZYLOPRIM) 100 MG tablet  Take 100 mg by mouth 2 times daily             aspirin 81 MG tablet  Take 81 mg by mouth daily             atorvastatin (LIPITOR) 40 MG tablet  Take 40 mg by mouth nightly             bumetanide (BUMEX) 1 MG tablet  Take 1 mg by mouth 3 times daily             Cholecalciferol (VITAMIN D) 2000 units CAPS capsule  Take by mouth daily             clopidogrel (PLAVIX) 75 MG tablet  Take 75 mg by mouth daily             diphenhydrAMINE (BENADRYL) 25 MG capsule  Take 25 mg by mouth nightly             FLUoxetine (PROZAC) 20 MG capsule  Take 20 mg by mouth nightly             fluticasone (FLONASE) 50 MCG/ACT nasal spray  2 sprays by Nasal route 2 times daily             gabapentin (NEURONTIN) 300 MG capsule  Take 300 mg by mouth 2 times daily             guaiFENesin (MUCINEX) 600 MG extended release tablet  Take 1,200 mg by mouth 2 times daily             hydrALAZINE (APRESOLINE) 10 MG tablet  Take 10 mg by mouth 3 times daily             insulin glargine (LANTUS) 100 UNIT/ML injection vial  Inject 42 Units into the skin nightly             lactulose 20 GM/30ML SOLN  Take by mouth 2 times daily             latanoprost (XALATAN) 0.005 % ophthalmic solution  Place 1 drop into both eyes nightly             levothyroxine (SYNTHROID) 75 MCG tablet  Take 75 mcg by mouth Daily             loratadine (CLARITIN) 10 MG capsule  Take 10 mg by mouth daily             melatonin 3 MG TABS tablet  Take 3 mg by mouth nightly             metolazone (ZAROXOLYN) 2.5 MG tablet  Take 2.5 mg by mouth daily             metoprolol succinate (TOPROL XL) 25 MG extended release tablet  Take 25 mg by mouth daily             olodaterol (STRIVERDI RESPIMAT) 2.5 MCG/ACT inhaler  Inhale 2 puffs into the lungs daily             OXYGEN  Inhale 2 L into the lungs continuous             sennosides-docusate sodium (SENOKOT-S) 8.6-50 MG tablet  Take 2 tablets by mouth 2 times daily             spironolactone (ALDACTONE) 50 MG tablet  Take 50 mg by mouth daily             tamsulosin (FLOMAX) 0.4 MG capsule  Take 0.4 mg by mouth daily             tiotropium (SPIRIVA) 18 MCG inhalation capsule  Inhale 18 mcg into the lungs daily             traMADol (ULTRAM) 50 MG tablet  Take 50 mg by mouth every 6 hours as needed for Pain             traZODone (DESYREL) 100 MG tablet  Take 100 mg by mouth nightly             warfarin (COUMADIN) 1 MG tablet  Take 1 mg by mouth every other day             warfarin (COUMADIN) 6 MG tablet  Take 6 mg by mouth                 Discharge Instructions: Follow up with PCP 3-5 days after discharge from inpatient rehabilitation. Take medications as directed. Resume activity as tolerated.     Diet: DIET CARB CONTROL; Carb Control: 4 carbs/meal (approximate 1800 kcals/day)     Disposition: Patient is medically stable and will be discharged [facility] acute inpatient rehab. Signed:  Manuel Kelley PA-C     Patient was examined and interviewed independently. Chart reviewed and events noted. No new complaints other than stated above. Vitals noted. Chest CTAB. S1 S2 RRR. GI benign. Neuro no new deficits. Integumentary no new rash. Labs noted. Follow INR    Stable for acute rehabilitation    Agree with above assessment and plan. Discharge to acute rehabilitation. Discharge time 49 minutes, including chart review, counseling, medication reconciliation, case discussion, patient exam, follow up and discharge planning.      Rodrigue Morgan MD  Hospitalist service  12/5/2017  6:33 PM

## 2017-12-05 NOTE — PROGRESS NOTES
History:   Diagnosis Date    Acute kidney failure (HCC)     Benign prostatic hyperplasia without urinary obstruction     CAD (coronary artery disease)     HFpEF NHYA Class 3, Stage C    CHF (congestive heart failure) (HCC)     history of    CHF (congestive heart failure) (HCC)     Chronic kidney disease (CKD)     Cirrhosis of liver (HCC)     Ascites/ followed by [facility]    COPD (chronic obstructive [facility] disease) [facility]     Diabetes mellitus ([facility]     Type 2    Emphysema lung ([facility]     Glaucoma     Gout     History of obstructive sleep apnea     Hyperlipidemia     Hypertension     Hypothyroidism     Muscle weakness     Myocardial infarction 12/2014    s/p RCA stent 12/2014    Occlusion and stenosis of left carotid artery     Personal history of pulmonary embolism 10/2014    Psychiatric problem     depression     Past Surgical History:   Procedure Laterality Date    AMPUTATION ABOVE KNEE Right 12/1/2017    RIGHT BELOW KNEE AMPUTATION performed by Deborah Baer MD at [facility] [address]  01/29/2015    23 mm mechanical valve, mitral valve annuloplasty repair w/30 mm Physio and tricuspid ring annuloplasty repair w/34 mm MC3 ring    CARDIAC CATHETERIZATION  12/11/14  [address]    EF60%    CARDIAC CATHETERIZATION  12/16/14  [facility]    right heart cath EF 60%    CORONARY ANGIOPLASTY WITH STENT PLACEMENT  12/18/14  [facility]    to RCA    LEG AMPUTATION BELOW KNEE Right 11/28/2017     RIGHT OPEN BELOW KNEE AMPUTATION performed by Deborah Baer MD at [facility] OR       Restrictions     Subjective   General  Chart Reviewed: (P) Yes  Family / Caregiver Present: (P) No  Subjective  Subjective: (P) Patient agrees to therapy  General Comment  Comments: (P) Patient in recliner wants to stay up agrees to EX  Pain Screening  Patient Currently in Pain: (P) Yes  Pain Assessment  Pain Assessment: (P) 0-10  Pain Level: (P) 3  Pain Type: (P) Phantom pain  Pain Location: (P) Leg  Pain Orientation: (P) Right  Vital Signs  Patient Currently in Pain: (P) Yes  Oxygen Therapy  O2 Device: (P) None (Room air)       Orientation  Orientation  Overall Orientation Status: (P) Within Normal Limits  Objective         Ambulation  Ambulation?: (P) No        Exercises  Straight Leg Raise: (P) 20  Quad Sets: (P) 20 L side  Heelslides: (P) 20 L side  Hip Abduction: (P) 20  Knee Active Range of Motion: (P) Yes L side  Ankle Pumps: (P) 20 L side  Comments: (P) EX.  Arom                        Assessment   Body structures, Functions, Activity limitations: (P) Decreased functional mobility   Activity Tolerance  Activity Tolerance: (P) Patient Tolerated treatment well       Discharge Recommendations:       G-Code     OutComes Score                                                    AM-PAC Score             Goals  Short term goals  Time Frame for Short term goals: (P) 2 weeks  Short term goal 1: (P) Transfer sit <> stand independently  Short term goal 2: (P) Ambulate 200 feet independently with assistive device  Short term goal 3: (P) Transfe bed <> chair independently    Plan    Plan  Times per week: 7  Times per day: Daily  Plan weeks: 2  Current Treatment Recommendations: Strengthening, Functional Mobility Training, Equipment Evaluation, Education, & procurement, Transfer Training, Gait Training     Therapy Time   Individual Concurrent Group Co-treatment   Time In           Time Out           Minutes                   Gabriella Falcon PTA     Electronically signed by Gabriella Falcon PTA on 12/5/2017 at 3:59 PM

## 2017-12-05 NOTE — CARE COORDINATION
The [address] at [facility]  Notification of Admission Decision      [x] Patient has been accepted for admit to [facility] on : 12/5/17 Room 826-1      Please write discharge orders and summary prior to discharge. [] Patient acceptance to Rehab pending the following :    [] Eval in progress       [] Patient determined to be ineligible for services at [facility] because : We recommend you consider        Thank you for your referral, we appreciate you.     Electronically Signed by Corrie Underwood Admissions Coordinator 12/5/2017 9:43 AM

## 2017-12-05 NOTE — PROGRESS NOTES
anticoagulation-Valve         No mechanical prophylaxis due to:   []  bilateral amputee  []  bilateral lower extremity trauma        []  patient refusal        []  continuing IV heparin         No pharmacologic prophylaxis due to active bleeding        []  bleeding risk        []  GI bleed        []  hemorrhage        []  patient refusal        []  risk of bleeding         []  thrombocytopenia        []  supratherapeutic INR    Beta Blocker:  continued        Patient seen on rounds this am with    I agree with objective and subjective, assessment and plan are mine.

## 2017-12-05 NOTE — PROGRESS NOTES
Vancomycin Day: 3  Current Dosin mg IV every 18 hours    Temp max:  98.2    Recent Labs      17   0328  17   0406   BUN  37*  27*       Recent Labs      17   0328  17   0406   CREATININE  1.3*  0.9       Recent Labs      17   0406  17   0425   WBC  12.0*  7.8         Intake/Output Summary (Last 24 hours) at 17 1435  Last data filed at 17 0903   Gross per 24 hour   Intake 480 ml   Output 400 ml   Net 80 ml   Culture Date Source Results   17 Stool C. Diff Negative                   Ht Readings from Last 1 Encounters:   17 5' 11\" (1.803 m)        Wt Readings from Last 1 Encounters:   17 187 lb 9.6 oz (85.1 kg)         Body mass index is 26.16 kg/m². Estimated Creatinine Clearance: 83 mL/min (based on SCr of 0.9 mg/dL).     Level ordered for: retime for 17 @1030    Assessment/Plan:  Await level    Electronically signed by BRITTANI Israel [facility] on 2017 at 2:35 PM

## 2017-12-06 ENCOUNTER — HOSPITAL ENCOUNTER (INPATIENT)
Age: 67
LOS: 12 days | Discharge: HOME HEALTH | DRG: 560 | End: 2017-12-18
Attending: PSYCHIATRY & NEUROLOGY | Admitting: PSYCHIATRY & NEUROLOGY
Payer: MEDICARE

## 2017-12-06 VITALS
OXYGEN SATURATION: 100 % | RESPIRATION RATE: 16 BRPM | SYSTOLIC BLOOD PRESSURE: 137 MMHG | HEIGHT: 71 IN | TEMPERATURE: 97.3 F | WEIGHT: 194.45 LBS | DIASTOLIC BLOOD PRESSURE: 65 MMHG | BODY MASS INDEX: 27.22 KG/M2 | HEART RATE: 85 BPM

## 2017-12-06 DIAGNOSIS — L97.509 TYPE 2 DIABETES MELLITUS WITH FOOT ULCER, WITH LONG-TERM CURRENT USE OF INSULIN (HCC): Chronic | ICD-10-CM

## 2017-12-06 DIAGNOSIS — E11.621 TYPE 2 DIABETES MELLITUS WITH FOOT ULCER, WITH LONG-TERM CURRENT USE OF INSULIN (HCC): Chronic | ICD-10-CM

## 2017-12-06 DIAGNOSIS — Z79.4 TYPE 2 DIABETES MELLITUS WITH FOOT ULCER, WITH LONG-TERM CURRENT USE OF INSULIN (HCC): Chronic | ICD-10-CM

## 2017-12-06 DIAGNOSIS — Z95.2 HISTORY OF HEART VALVE REPLACEMENT WITH MECHANICAL VALVE: Primary | Chronic | ICD-10-CM

## 2017-12-06 DIAGNOSIS — Z89.511 S/P BKA (BELOW KNEE AMPUTATION), RIGHT (HCC): ICD-10-CM

## 2017-12-06 PROBLEM — Z89.512 S/P BKA (BELOW KNEE AMPUTATION) BILATERAL (HCC): Status: ACTIVE | Noted: 2017-12-06

## 2017-12-06 LAB
BILIRUBIN URINE: NEGATIVE
BLOOD BANK DISPENSE STATUS: NORMAL
BLOOD BANK PRODUCT CODE: NORMAL
BLOOD, URINE: NEGATIVE
BPU ID: NORMAL
CLARITY: CLEAR
COLOR: ABNORMAL
DESCRIPTION BLOOD BANK: NORMAL
GLUCOSE BLD-MCNC: 122 MG/DL (ref 70–99)
GLUCOSE BLD-MCNC: 206 MG/DL (ref 70–99)
GLUCOSE BLD-MCNC: 309 MG/DL (ref 70–99)
GLUCOSE URINE: 500 MG/DL
HCT VFR BLD CALC: 22.8 % (ref 42–52)
HEMOGLOBIN: 6.8 G/DL (ref 14–18)
INR BLD: 1.31 (ref 0.88–1.18)
KETONES, URINE: NEGATIVE MG/DL
LEUKOCYTE ESTERASE, URINE: NEGATIVE
MCH RBC QN AUTO: 26.8 PG (ref 27–31)
MCHC RBC AUTO-ENTMCNC: 29.8 G/DL (ref 33–37)
MCV RBC AUTO: 89.8 FL (ref 80–94)
NITRITE, URINE: NEGATIVE
PDW BLD-RTO: 17 % (ref 11.5–14.5)
PERFORMED ON: ABNORMAL
PH UA: 5.5
PLATELET # BLD: 196 K/UL (ref 130–400)
PMV BLD AUTO: 9.8 FL (ref 9.4–12.4)
PROTEIN UA: 30 MG/DL
PROTHROMBIN TIME: 16.3 SEC (ref 12–14.6)
RBC # BLD: 2.54 M/UL (ref 4.7–6.1)
SPECIFIC GRAVITY UA: 1.02
UROBILINOGEN, URINE: 0.2 E.U./DL
VANCOMYCIN TROUGH: 13.5 UG/ML (ref 10–20)
WBC # BLD: 6.8 K/UL (ref 4.8–10.8)

## 2017-12-06 PROCEDURE — 6360000002 HC RX W HCPCS: Performed by: PHYSICIAN ASSISTANT

## 2017-12-06 PROCEDURE — 36430 TRANSFUSION BLD/BLD COMPNT: CPT

## 2017-12-06 PROCEDURE — 6370000000 HC RX 637 (ALT 250 FOR IP): Performed by: NURSE PRACTITIONER

## 2017-12-06 PROCEDURE — 2500000003 HC RX 250 WO HCPCS: Performed by: INTERNAL MEDICINE

## 2017-12-06 PROCEDURE — 6360000002 HC RX W HCPCS: Performed by: FAMILY MEDICINE

## 2017-12-06 PROCEDURE — 2580000003 HC RX 258: Performed by: SURGERY

## 2017-12-06 PROCEDURE — 2580000003 HC RX 258: Performed by: PHYSICIAN ASSISTANT

## 2017-12-06 PROCEDURE — 6370000000 HC RX 637 (ALT 250 FOR IP): Performed by: FAMILY MEDICINE

## 2017-12-06 PROCEDURE — 87086 URINE CULTURE/COLONY COUNT: CPT

## 2017-12-06 PROCEDURE — 6360000002 HC RX W HCPCS: Performed by: HOSPITALIST

## 2017-12-06 PROCEDURE — 6360000002 HC RX W HCPCS: Performed by: SURGERY

## 2017-12-06 PROCEDURE — P9016 RBC LEUKOCYTES REDUCED: HCPCS

## 2017-12-06 PROCEDURE — 81001 URINALYSIS AUTO W/SCOPE: CPT

## 2017-12-06 PROCEDURE — 6370000000 HC RX 637 (ALT 250 FOR IP): Performed by: INTERNAL MEDICINE

## 2017-12-06 PROCEDURE — 99024 POSTOP FOLLOW-UP VISIT: CPT | Performed by: SURGERY

## 2017-12-06 PROCEDURE — 1180000000 HC REHAB R&B

## 2017-12-06 PROCEDURE — 6370000000 HC RX 637 (ALT 250 FOR IP): Performed by: SURGERY

## 2017-12-06 PROCEDURE — 6370000000 HC RX 637 (ALT 250 FOR IP): Performed by: PSYCHIATRY & NEUROLOGY

## 2017-12-06 PROCEDURE — 82948 REAGENT STRIP/BLOOD GLUCOSE: CPT

## 2017-12-06 RX ORDER — POLYETHYLENE GLYCOL 3350 17 G/17G
17 POWDER, FOR SOLUTION ORAL DAILY
Status: DISCONTINUED | OUTPATIENT
Start: 2017-12-06 | End: 2017-12-07

## 2017-12-06 RX ORDER — ACETAMINOPHEN 325 MG/1
650 TABLET ORAL EVERY 4 HOURS PRN
Status: DISCONTINUED | OUTPATIENT
Start: 2017-12-06 | End: 2017-12-18 | Stop reason: HOSPADM

## 2017-12-06 RX ORDER — TAMSULOSIN HYDROCHLORIDE 0.4 MG/1
0.4 CAPSULE ORAL DAILY
Status: DISCONTINUED | OUTPATIENT
Start: 2017-12-06 | End: 2017-12-18 | Stop reason: HOSPADM

## 2017-12-06 RX ORDER — TRAMADOL HYDROCHLORIDE 50 MG/1
100 TABLET ORAL EVERY 8 HOURS SCHEDULED
Status: DISCONTINUED | OUTPATIENT
Start: 2017-12-06 | End: 2017-12-08

## 2017-12-06 RX ORDER — TRAMADOL HYDROCHLORIDE 50 MG/1
50 TABLET ORAL EVERY 8 HOURS SCHEDULED
Status: DISCONTINUED | OUTPATIENT
Start: 2017-12-06 | End: 2017-12-08

## 2017-12-06 RX ORDER — FLUOXETINE HYDROCHLORIDE 20 MG/1
20 CAPSULE ORAL NIGHTLY
Status: DISCONTINUED | OUTPATIENT
Start: 2017-12-06 | End: 2017-12-18 | Stop reason: HOSPADM

## 2017-12-06 RX ORDER — WARFARIN SODIUM 5 MG/1
5 TABLET ORAL DAILY
Status: DISCONTINUED | OUTPATIENT
Start: 2017-12-06 | End: 2017-12-10

## 2017-12-06 RX ORDER — TRAZODONE HYDROCHLORIDE 50 MG/1
100 TABLET ORAL NIGHTLY
Status: DISCONTINUED | OUTPATIENT
Start: 2017-12-06 | End: 2017-12-18 | Stop reason: HOSPADM

## 2017-12-06 RX ORDER — HYDROCODONE BITARTRATE AND ACETAMINOPHEN 5; 325 MG/1; MG/1
2 TABLET ORAL EVERY 4 HOURS PRN
Status: DISCONTINUED | OUTPATIENT
Start: 2017-12-06 | End: 2017-12-07

## 2017-12-06 RX ORDER — PANTOPRAZOLE SODIUM 40 MG/1
40 TABLET, DELAYED RELEASE ORAL
Status: DISCONTINUED | OUTPATIENT
Start: 2017-12-07 | End: 2017-12-18 | Stop reason: HOSPADM

## 2017-12-06 RX ORDER — MORPHINE SULFATE 4 MG/ML
2 INJECTION, SOLUTION INTRAMUSCULAR; INTRAVENOUS EVERY 4 HOURS PRN
Status: CANCELLED | OUTPATIENT
Start: 2017-12-06

## 2017-12-06 RX ORDER — GUAIFENESIN 600 MG/1
1200 TABLET, EXTENDED RELEASE ORAL 2 TIMES DAILY
Status: DISCONTINUED | OUTPATIENT
Start: 2017-12-06 | End: 2017-12-18 | Stop reason: HOSPADM

## 2017-12-06 RX ORDER — HYDROCODONE BITARTRATE AND ACETAMINOPHEN 5; 325 MG/1; MG/1
1 TABLET ORAL EVERY 4 HOURS PRN
Status: DISCONTINUED | OUTPATIENT
Start: 2017-12-06 | End: 2017-12-07

## 2017-12-06 RX ORDER — ALLOPURINOL 100 MG/1
100 TABLET ORAL 2 TIMES DAILY
Status: DISCONTINUED | OUTPATIENT
Start: 2017-12-06 | End: 2017-12-18 | Stop reason: HOSPADM

## 2017-12-06 RX ORDER — BISACODYL 10 MG
10 SUPPOSITORY, RECTAL RECTAL DAILY PRN
Status: DISCONTINUED | OUTPATIENT
Start: 2017-12-06 | End: 2017-12-18 | Stop reason: HOSPADM

## 2017-12-06 RX ORDER — METOPROLOL SUCCINATE 50 MG/1
25 TABLET, EXTENDED RELEASE ORAL DAILY
Status: DISCONTINUED | OUTPATIENT
Start: 2017-12-07 | End: 2017-12-18 | Stop reason: HOSPADM

## 2017-12-06 RX ORDER — DOCUSATE SODIUM 100 MG/1
100 CAPSULE, LIQUID FILLED ORAL 2 TIMES DAILY
Status: DISCONTINUED | OUTPATIENT
Start: 2017-12-06 | End: 2017-12-07

## 2017-12-06 RX ORDER — ONDANSETRON 2 MG/ML
4 INJECTION INTRAMUSCULAR; INTRAVENOUS EVERY 6 HOURS PRN
Status: DISCONTINUED | OUTPATIENT
Start: 2017-12-06 | End: 2017-12-10

## 2017-12-06 RX ORDER — INSULIN GLARGINE 100 [IU]/ML
47 INJECTION, SOLUTION SUBCUTANEOUS NIGHTLY
Status: DISCONTINUED | OUTPATIENT
Start: 2017-12-06 | End: 2017-12-18 | Stop reason: HOSPADM

## 2017-12-06 RX ORDER — GABAPENTIN 300 MG/1
300 CAPSULE ORAL 2 TIMES DAILY
Status: DISCONTINUED | OUTPATIENT
Start: 2017-12-06 | End: 2017-12-09

## 2017-12-06 RX ORDER — ASPIRIN 81 MG/1
81 TABLET, CHEWABLE ORAL DAILY
Status: DISCONTINUED | OUTPATIENT
Start: 2017-12-06 | End: 2017-12-18 | Stop reason: HOSPADM

## 2017-12-06 RX ORDER — LACTULOSE 10 G/15ML
20 SOLUTION ORAL DAILY
Status: DISCONTINUED | OUTPATIENT
Start: 2017-12-06 | End: 2017-12-18 | Stop reason: HOSPADM

## 2017-12-06 RX ORDER — 0.9 % SODIUM CHLORIDE 0.9 %
250 INTRAVENOUS SOLUTION INTRAVENOUS ONCE
Status: COMPLETED | OUTPATIENT
Start: 2017-12-06 | End: 2017-12-06

## 2017-12-06 RX ORDER — CALCIUM CARBONATE 200(500)MG
500 TABLET,CHEWABLE ORAL EVERY 4 HOURS PRN
Status: DISCONTINUED | OUTPATIENT
Start: 2017-12-06 | End: 2017-12-18 | Stop reason: HOSPADM

## 2017-12-06 RX ORDER — ATORVASTATIN CALCIUM 40 MG/1
40 TABLET, FILM COATED ORAL NIGHTLY
Status: DISCONTINUED | OUTPATIENT
Start: 2017-12-06 | End: 2017-12-18 | Stop reason: HOSPADM

## 2017-12-06 RX ORDER — NICOTINE POLACRILEX 4 MG
15 LOZENGE BUCCAL PRN
Status: DISCONTINUED | OUTPATIENT
Start: 2017-12-06 | End: 2017-12-18 | Stop reason: HOSPADM

## 2017-12-06 RX ORDER — SODIUM PHOSPHATE, DIBASIC AND SODIUM PHOSPHATE, MONOBASIC 7; 19 G/133ML; G/133ML
1 ENEMA RECTAL
Status: ACTIVE | OUTPATIENT
Start: 2017-12-06 | End: 2017-12-06

## 2017-12-06 RX ORDER — LATANOPROST 50 UG/ML
1 SOLUTION/ DROPS OPHTHALMIC NIGHTLY
Status: DISCONTINUED | OUTPATIENT
Start: 2017-12-06 | End: 2017-12-18 | Stop reason: HOSPADM

## 2017-12-06 RX ADMIN — TRAMADOL HYDROCHLORIDE 100 MG: 50 TABLET, COATED ORAL at 22:00

## 2017-12-06 RX ADMIN — ATORVASTATIN CALCIUM 40 MG: 40 TABLET, FILM COATED ORAL at 20:18

## 2017-12-06 RX ADMIN — METRONIDAZOLE 500 MG: 500 INJECTION, SOLUTION INTRAVENOUS at 01:08

## 2017-12-06 RX ADMIN — FLUOXETINE HYDROCHLORIDE 20 MG: 20 CAPSULE ORAL at 20:17

## 2017-12-06 RX ADMIN — LATANOPROST 1 DROP: 50 SOLUTION OPHTHALMIC at 22:30

## 2017-12-06 RX ADMIN — HYDROCODONE BITARTRATE AND ACETAMINOPHEN 2 TABLET: 5; 325 TABLET ORAL at 20:17

## 2017-12-06 RX ADMIN — HYDROCODONE BITARTRATE AND ACETAMINOPHEN 2 TABLET: 5; 325 TABLET ORAL at 23:40

## 2017-12-06 RX ADMIN — PANTOPRAZOLE SODIUM 40 MG: 40 TABLET, DELAYED RELEASE ORAL at 06:09

## 2017-12-06 RX ADMIN — METRONIDAZOLE 500 MG: 500 INJECTION, SOLUTION INTRAVENOUS at 13:56

## 2017-12-06 RX ADMIN — INSULIN GLARGINE 47 UNITS: 100 INJECTION, SOLUTION SUBCUTANEOUS at 22:30

## 2017-12-06 RX ADMIN — WARFARIN SODIUM 5 MG: 5 TABLET ORAL at 22:00

## 2017-12-06 RX ADMIN — HYDROCODONE BITARTRATE AND ACETAMINOPHEN 2 TABLET: 5; 325 TABLET ORAL at 15:21

## 2017-12-06 RX ADMIN — ALLOPURINOL 100 MG: 100 TABLET ORAL at 20:17

## 2017-12-06 RX ADMIN — INSULIN LISPRO 6 UNITS: 100 INJECTION, SOLUTION INTRAVENOUS; SUBCUTANEOUS at 13:58

## 2017-12-06 RX ADMIN — TAMSULOSIN HYDROCHLORIDE 0.4 MG: 0.4 CAPSULE ORAL at 09:22

## 2017-12-06 RX ADMIN — ENOXAPARIN SODIUM 80 MG: 80 INJECTION SUBCUTANEOUS at 09:11

## 2017-12-06 RX ADMIN — LEVOTHYROXINE SODIUM 75 MCG: 75 TABLET ORAL at 06:09

## 2017-12-06 RX ADMIN — GUAIFENESIN 1200 MG: 600 TABLET, EXTENDED RELEASE ORAL at 08:58

## 2017-12-06 RX ADMIN — INSULIN LISPRO 6 UNITS: 100 INJECTION, SOLUTION INTRAVENOUS; SUBCUTANEOUS at 22:32

## 2017-12-06 RX ADMIN — HYDROCODONE BITARTRATE AND ACETAMINOPHEN 2 TABLET: 5; 325 TABLET ORAL at 06:09

## 2017-12-06 RX ADMIN — MEROPENEM 1 G: 1 INJECTION, POWDER, FOR SOLUTION INTRAVENOUS at 08:54

## 2017-12-06 RX ADMIN — MORPHINE SULFATE 2 MG: 4 INJECTION, SOLUTION INTRAMUSCULAR; INTRAVENOUS at 03:02

## 2017-12-06 RX ADMIN — ENOXAPARIN SODIUM 80 MG: 80 INJECTION SUBCUTANEOUS at 20:16

## 2017-12-06 RX ADMIN — TIOTROPIUM BROMIDE 18 MCG: 18 CAPSULE ORAL; RESPIRATORY (INHALATION) at 11:15

## 2017-12-06 RX ADMIN — Medication 10 ML: at 09:02

## 2017-12-06 RX ADMIN — METOPROLOL SUCCINATE 25 MG: 25 TABLET, EXTENDED RELEASE ORAL at 08:55

## 2017-12-06 RX ADMIN — GABAPENTIN 300 MG: 300 CAPSULE ORAL at 08:54

## 2017-12-06 RX ADMIN — MEROPENEM 1 G: 1 INJECTION, POWDER, FOR SOLUTION INTRAVENOUS at 00:30

## 2017-12-06 RX ADMIN — TRAZODONE HYDROCHLORIDE 100 MG: 50 TABLET, FILM COATED ORAL at 20:17

## 2017-12-06 RX ADMIN — HYDROCODONE BITARTRATE AND ACETAMINOPHEN 2 TABLET: 5; 325 TABLET ORAL at 10:18

## 2017-12-06 RX ADMIN — ASPIRIN 81 MG CHEWABLE TABLET 81 MG: 81 TABLET CHEWABLE at 08:58

## 2017-12-06 RX ADMIN — TAMSULOSIN HYDROCHLORIDE 0.4 MG: 0.4 CAPSULE ORAL at 22:00

## 2017-12-06 RX ADMIN — ALLOPURINOL 100 MG: 100 TABLET ORAL at 08:54

## 2017-12-06 RX ADMIN — HYDROCODONE BITARTRATE AND ACETAMINOPHEN 2 TABLET: 5; 325 TABLET ORAL at 01:16

## 2017-12-06 RX ADMIN — GUAIFENESIN 1200 MG: 600 TABLET, EXTENDED RELEASE ORAL at 20:16

## 2017-12-06 RX ADMIN — GABAPENTIN 300 MG: 300 CAPSULE ORAL at 20:17

## 2017-12-06 RX ADMIN — SODIUM CHLORIDE 250 ML: 9 INJECTION, SOLUTION INTRAVENOUS at 11:00

## 2017-12-06 ASSESSMENT — PAIN SCALES - GENERAL
PAINLEVEL_OUTOF10: 8
PAINLEVEL_OUTOF10: 1
PAINLEVEL_OUTOF10: 8
PAINLEVEL_OUTOF10: 7
PAINLEVEL_OUTOF10: 1
PAINLEVEL_OUTOF10: 10
PAINLEVEL_OUTOF10: 8
PAINLEVEL_OUTOF10: 8
PAINLEVEL_OUTOF10: 7
PAINLEVEL_OUTOF10: 8

## 2017-12-06 NOTE — PROGRESS NOTES
Vascular Surgery Rounding Note                                                     Dr.Timothy Skinner    12/6/2017  8:30 AM  Antibiotic: Merrem 1gm IVPB IV every 8 hours                   Flagyl 500mg IVPB IV every 8 hours                   Vancomycin per pharmacy dosing    Post op day - #8 open amp of right leg   Post op day -#5-   Right below the knee amputation. Subjective- stating right leg still sore but the protector feels good on it    Objective-   Invalid input(s): 24H    Intake/Output Summary (Last 24 hours) at 12/06/17 0830  Last data filed at 12/06/17 0611   Gross per 24 hour   Intake           4742.5 ml   Output              325 ml   Net           4417.5 ml     No intake/output data recorded. Physical Exam:  Awake, alert, appropriate No  BP (!) 99/46   Pulse 82   Temp 97.6 °F (36.4 °C)   Resp 18   Ht 5' 11\" (1.803 m)   Wt 194 lb 7.1 oz (88.2 kg)   SpO2 97%   BMI 27.12 kg/m²   Heart- regular rate and rhythm, valve click noted  Lung-clear bilateral anterior, diminished lower lobe sounds  Neck-supple, symmetrical, trachea midline  Abdomen-Abdomen soft, non-tender. BS normal.  Extremities-right BKA incision line well approximated with sutures intact, small amount of drainage noted on old dressings  Neurologic- alert, oriented, normal speech, no focal findings or movement disorder noted  Wound surgical incisions-BKA with sutures intact, CoFlex dressing CDI. Wound looks excellent, knee has good mobility    Assessment/Plan: 1. FloTech protector for right BKA  2. PT/OT. 3. Stop antibiotic   4. Warfarin resumed- PT 16.3/INR 1.31  5. To rehab today  Antibiotics continued after surgery due to:   Infection -  [x]  Abscess    []  Pneumonia    []  Aspiration Pneumonia    []  Sepsis    [x]  Cellulitis    []  Positive culture    []  UTI    []  Osteomyelitis    []  Fungal infection    []  H.pylori    DVT prophylaxis:Full anticoagulation-Valve          Beta Blocker:

## 2017-12-07 LAB
AMORPHOUS: ABNORMAL /HPF
ANION GAP SERPL CALCULATED.3IONS-SCNC: 8 MMOL/L (ref 7–19)
BACTERIA: ABNORMAL /HPF
BASOPHILS ABSOLUTE: 0 K/UL (ref 0–0.2)
BASOPHILS RELATIVE PERCENT: 0.6 % (ref 0–1)
BLOOD BANK DISPENSE STATUS: NORMAL
BLOOD BANK DISPENSE STATUS: NORMAL
BLOOD BANK PRODUCT CODE: NORMAL
BLOOD BANK PRODUCT CODE: NORMAL
BPU ID: NORMAL
BPU ID: NORMAL
BUN BLDV-MCNC: 14 MG/DL (ref 8–23)
CALCIUM SERPL-MCNC: 8.1 MG/DL (ref 8.8–10.2)
CHLORIDE BLD-SCNC: 103 MMOL/L (ref 98–111)
CO2: 28 MMOL/L (ref 22–29)
CREAT SERPL-MCNC: 0.9 MG/DL (ref 0.5–1.2)
DESCRIPTION BLOOD BANK: NORMAL
DESCRIPTION BLOOD BANK: NORMAL
EOSINOPHILS ABSOLUTE: 0.2 K/UL (ref 0–0.6)
EOSINOPHILS RELATIVE PERCENT: 2.9 % (ref 0–5)
GFR NON-AFRICAN AMERICAN: >60
GLUCOSE BLD-MCNC: 109 MG/DL (ref 74–109)
GLUCOSE BLD-MCNC: 121 MG/DL (ref 70–99)
GLUCOSE BLD-MCNC: 179 MG/DL (ref 70–99)
GLUCOSE BLD-MCNC: 195 MG/DL (ref 70–99)
GLUCOSE BLD-MCNC: 208 MG/DL (ref 70–99)
HCT VFR BLD CALC: 24.5 % (ref 42–52)
HEMOGLOBIN: 7.6 G/DL (ref 14–18)
INR BLD: 1.48 (ref 0.88–1.18)
LYMPHOCYTES ABSOLUTE: 0.7 K/UL (ref 1.1–4.5)
LYMPHOCYTES RELATIVE PERCENT: 9.7 % (ref 20–40)
MCH RBC QN AUTO: 27.6 PG (ref 27–31)
MCHC RBC AUTO-ENTMCNC: 31 G/DL (ref 33–37)
MCV RBC AUTO: 89.1 FL (ref 80–94)
MONOCYTES ABSOLUTE: 0.6 K/UL (ref 0–0.9)
MONOCYTES RELATIVE PERCENT: 8.8 % (ref 0–10)
NEUTROPHILS ABSOLUTE: 5.4 K/UL (ref 1.5–7.5)
NEUTROPHILS RELATIVE PERCENT: 76.6 % (ref 50–65)
PDW BLD-RTO: 17 % (ref 11.5–14.5)
PERFORMED ON: ABNORMAL
PLATELET # BLD: 202 K/UL (ref 130–400)
PMV BLD AUTO: 9.5 FL (ref 9.4–12.4)
POTASSIUM SERPL-SCNC: 4 MMOL/L (ref 3.5–5)
PREALBUMIN: 12 MG/DL (ref 20–40)
PROTHROMBIN TIME: 17.9 SEC (ref 12–14.6)
RBC # BLD: 2.75 M/UL (ref 4.7–6.1)
SODIUM BLD-SCNC: 139 MMOL/L (ref 136–145)
WBC # BLD: 7 K/UL (ref 4.8–10.8)
WBC UA: ABNORMAL /HPF (ref 0–5)

## 2017-12-07 PROCEDURE — 6370000000 HC RX 637 (ALT 250 FOR IP): Performed by: FAMILY MEDICINE

## 2017-12-07 PROCEDURE — 2700000000 HC OXYGEN THERAPY PER DAY

## 2017-12-07 PROCEDURE — 6360000002 HC RX W HCPCS: Performed by: FAMILY MEDICINE

## 2017-12-07 PROCEDURE — 97530 THERAPEUTIC ACTIVITIES: CPT

## 2017-12-07 PROCEDURE — 85025 COMPLETE CBC W/AUTO DIFF WBC: CPT

## 2017-12-07 PROCEDURE — 1180000000 HC REHAB R&B

## 2017-12-07 PROCEDURE — 80048 BASIC METABOLIC PNL TOTAL CA: CPT

## 2017-12-07 PROCEDURE — 84134 ASSAY OF PREALBUMIN: CPT

## 2017-12-07 PROCEDURE — 6370000000 HC RX 637 (ALT 250 FOR IP): Performed by: PSYCHIATRY & NEUROLOGY

## 2017-12-07 PROCEDURE — 85610 PROTHROMBIN TIME: CPT

## 2017-12-07 PROCEDURE — 82948 REAGENT STRIP/BLOOD GLUCOSE: CPT

## 2017-12-07 PROCEDURE — 97166 OT EVAL MOD COMPLEX 45 MIN: CPT

## 2017-12-07 PROCEDURE — 36415 COLL VENOUS BLD VENIPUNCTURE: CPT

## 2017-12-07 PROCEDURE — 99223 1ST HOSP IP/OBS HIGH 75: CPT | Performed by: PSYCHIATRY & NEUROLOGY

## 2017-12-07 PROCEDURE — 97161 PT EVAL LOW COMPLEX 20 MIN: CPT

## 2017-12-07 PROCEDURE — 97116 GAIT TRAINING THERAPY: CPT

## 2017-12-07 PROCEDURE — 97535 SELF CARE MNGMENT TRAINING: CPT

## 2017-12-07 PROCEDURE — 97110 THERAPEUTIC EXERCISES: CPT

## 2017-12-07 RX ORDER — OXYCODONE HCL 10 MG/1
10 TABLET, FILM COATED, EXTENDED RELEASE ORAL EVERY 12 HOURS SCHEDULED
Status: DISCONTINUED | OUTPATIENT
Start: 2017-12-07 | End: 2017-12-08

## 2017-12-07 RX ORDER — OXYCODONE HYDROCHLORIDE 5 MG/1
15 TABLET ORAL EVERY 4 HOURS PRN
Status: DISCONTINUED | OUTPATIENT
Start: 2017-12-07 | End: 2017-12-13

## 2017-12-07 RX ADMIN — ENOXAPARIN SODIUM 80 MG: 80 INJECTION SUBCUTANEOUS at 07:45

## 2017-12-07 RX ADMIN — INSULIN LISPRO 3 UNITS: 100 INJECTION, SOLUTION INTRAVENOUS; SUBCUTANEOUS at 17:21

## 2017-12-07 RX ADMIN — OXYCODONE HYDROCHLORIDE 15 MG: 5 TABLET ORAL at 20:54

## 2017-12-07 RX ADMIN — ALLOPURINOL 100 MG: 100 TABLET ORAL at 07:43

## 2017-12-07 RX ADMIN — WARFARIN SODIUM 5 MG: 5 TABLET ORAL at 17:20

## 2017-12-07 RX ADMIN — INSULIN LISPRO 3 UNITS: 100 INJECTION, SOLUTION INTRAVENOUS; SUBCUTANEOUS at 11:55

## 2017-12-07 RX ADMIN — HYDROCODONE BITARTRATE AND ACETAMINOPHEN 2 TABLET: 5; 325 TABLET ORAL at 03:20

## 2017-12-07 RX ADMIN — INSULIN GLARGINE 47 UNITS: 100 INJECTION, SOLUTION SUBCUTANEOUS at 20:55

## 2017-12-07 RX ADMIN — OXYCODONE HYDROCHLORIDE 10 MG: 10 TABLET, FILM COATED, EXTENDED RELEASE ORAL at 20:53

## 2017-12-07 RX ADMIN — LACTULOSE 20 G: 20 SOLUTION ORAL at 07:43

## 2017-12-07 RX ADMIN — GABAPENTIN 300 MG: 300 CAPSULE ORAL at 20:53

## 2017-12-07 RX ADMIN — GUAIFENESIN 1200 MG: 600 TABLET, EXTENDED RELEASE ORAL at 20:53

## 2017-12-07 RX ADMIN — TIOTROPIUM BROMIDE 18 MCG: 18 CAPSULE ORAL; RESPIRATORY (INHALATION) at 07:45

## 2017-12-07 RX ADMIN — ALLOPURINOL 100 MG: 100 TABLET ORAL at 20:54

## 2017-12-07 RX ADMIN — ENOXAPARIN SODIUM 80 MG: 80 INJECTION SUBCUTANEOUS at 20:54

## 2017-12-07 RX ADMIN — GUAIFENESIN 1200 MG: 600 TABLET, EXTENDED RELEASE ORAL at 07:43

## 2017-12-07 RX ADMIN — INSULIN LISPRO 3 UNITS: 100 INJECTION, SOLUTION INTRAVENOUS; SUBCUTANEOUS at 22:06

## 2017-12-07 RX ADMIN — TAMSULOSIN HYDROCHLORIDE 0.4 MG: 0.4 CAPSULE ORAL at 07:43

## 2017-12-07 RX ADMIN — GABAPENTIN 300 MG: 300 CAPSULE ORAL at 07:44

## 2017-12-07 RX ADMIN — TRAMADOL HYDROCHLORIDE 100 MG: 50 TABLET, COATED ORAL at 14:06

## 2017-12-07 RX ADMIN — LEVOTHYROXINE SODIUM 75 MCG: 50 TABLET ORAL at 05:27

## 2017-12-07 RX ADMIN — OXYCODONE HYDROCHLORIDE 15 MG: 5 TABLET ORAL at 15:53

## 2017-12-07 RX ADMIN — TRAMADOL HYDROCHLORIDE 100 MG: 50 TABLET, COATED ORAL at 20:54

## 2017-12-07 RX ADMIN — TRAZODONE HYDROCHLORIDE 100 MG: 50 TABLET, FILM COATED ORAL at 20:54

## 2017-12-07 RX ADMIN — TRAMADOL HYDROCHLORIDE 100 MG: 50 TABLET, COATED ORAL at 05:26

## 2017-12-07 RX ADMIN — OXYCODONE HYDROCHLORIDE 10 MG: 10 TABLET, FILM COATED, EXTENDED RELEASE ORAL at 07:47

## 2017-12-07 RX ADMIN — OXYCODONE HYDROCHLORIDE 15 MG: 5 TABLET ORAL at 07:47

## 2017-12-07 RX ADMIN — OXYCODONE HYDROCHLORIDE 15 MG: 5 TABLET ORAL at 11:55

## 2017-12-07 RX ADMIN — FLUOXETINE HYDROCHLORIDE 20 MG: 20 CAPSULE ORAL at 20:54

## 2017-12-07 RX ADMIN — ATORVASTATIN CALCIUM 40 MG: 40 TABLET, FILM COATED ORAL at 20:54

## 2017-12-07 RX ADMIN — PANTOPRAZOLE SODIUM 40 MG: 40 TABLET, DELAYED RELEASE ORAL at 05:27

## 2017-12-07 RX ADMIN — ASPIRIN 81 MG CHEWABLE TABLET 81 MG: 81 TABLET CHEWABLE at 07:43

## 2017-12-07 RX ADMIN — METOPROLOL SUCCINATE 25 MG: 50 TABLET, FILM COATED, EXTENDED RELEASE ORAL at 07:44

## 2017-12-07 ASSESSMENT — PAIN SCALES - GENERAL
PAINLEVEL_OUTOF10: 6
PAINLEVEL_OUTOF10: 8
PAINLEVEL_OUTOF10: 7
PAINLEVEL_OUTOF10: 3
PAINLEVEL_OUTOF10: 4
PAINLEVEL_OUTOF10: 8
PAINLEVEL_OUTOF10: 8
PAINLEVEL_OUTOF10: 4
PAINLEVEL_OUTOF10: 7
PAINLEVEL_OUTOF10: 8
PAINLEVEL_OUTOF10: 8
PAINLEVEL_OUTOF10: 3

## 2017-12-07 ASSESSMENT — PAIN DESCRIPTION - LOCATION: LOCATION: ANKLE

## 2017-12-07 ASSESSMENT — PAIN DESCRIPTION - PAIN TYPE: TYPE: PHANTOM PAIN

## 2017-12-07 ASSESSMENT — PAIN DESCRIPTION - DESCRIPTORS: DESCRIPTORS: SHOOTING

## 2017-12-07 ASSESSMENT — PAIN DESCRIPTION - FREQUENCY: FREQUENCY: CONTINUOUS

## 2017-12-07 ASSESSMENT — PAIN DESCRIPTION - ORIENTATION: ORIENTATION: RIGHT

## 2017-12-07 NOTE — PROGRESS NOTES
Occupational Therapy   Occupational Therapy Initial Assessment  Date: 2017   Patient Name: Kerrie Gomes  MRN: [id number]     : 1950    Patient Diagnosis(es): There were no encounter diagnoses. has a past medical history of Acute kidney failure ([facility] Benign prostatic hyperplasia without urinary obstruction; CAD (coronary artery disease); CHF (congestive heart failure) (HCC); CHF (congestive heart failure) ([facility] Chronic kidney disease (CKD); Cirrhosis of liver ([facility] COPD (chronic obstructive pulmonary disease) ([facility] Diabetes mellitus ([facility] Emphysema lung ([facility] Glaucoma; Gout; History of obstructive sleep apnea; Hyperlipidemia; Hypertension; Hypothyroidism; Muscle weakness; Myocardial infarction; Occlusion and stenosis of left carotid artery; Personal history of pulmonary embolism; and Psychiatric problem. has a past surgical history that includes Cardiac catheterization (14  [facility]); Cardiac catheterization (14  [facility]); Coronary angioplasty with stent (14  [facility]); Aortic valve replacement (2015); Leg amputation below knee (Right, 2017); and AMPUTATION ABOVE KNEE (Right, 2017).     Treatment Diagnosis: R BKA       Restrictions  Restrictions/Precautions  Restrictions/Precautions: Fall Risk, Weight Bearing  Required Braces or Orthoses?: Yes  Lower Extremity Weight Bearing Restrictions  Right Lower Extremity Weight Bearing: Non Weight Bearing  Left Lower Extremity Weight Bearing: Weight Bearing As Tolerated  Required Braces or Orthoses  Right Lower Extremity Brace:  (MORELIA-TECH)    Subjective   General  Chart Reviewed: Yes  Patient assessed for rehabilitation services?: Yes  Family / Caregiver Present: No  Diagnosis: R BKA   Pain Assessment  Patient Currently in Pain: Yes  Pain Assessment: 0-10  Pain Level: 8  Pain Type: Phantom pain  Pain Location: Ankle  Pain Orientation: Right  Pain Descriptors: Shooting  Pain Frequency: Continuous  Pain Intervention(s): Repositioned  Oxygen Therapy  SpO2: 98 %  Pulse Oximeter Device Mode: Intermittent  Pulse Oximeter Device Location: Left;Finger  O2 Device: Nasal cannula  O2 Flow Rate (L/min): 2 L/min  Social/Functional History  Social/Functional History  Lives With: Spouse  Type of Home: House  Home Layout: One level ( )  Home Access:  (lift chair for 20 steps)  Bathroom Shower/Tub: Walk-in shower  Bathroom Equipment: Grab bars in shower (Built in bench )  Home Equipment: Wheelchair-manual, Rolling walker, Standard walker, 4 wheeled walker (3 w/c, without R elevated foot rest )  ADL Assistance: Needs assistance (Has home health for showers and dressing )  Homemaking Assistance: Needs assistance  Ambulation Assistance: Independent  Transfer Assistance: Independent  Active : No  Occupation: Retired  Type of occupation: [address]: [employer], some cooking on stove. Wife does laundry or niece. Additional Comments: Home Health aide through [place] comes 4 days a week, 2 hours per day. Objective   Vision: Impaired (Glaucoma )  Vision Exceptions: Wears glasses at all times  Hearing: Within functional limits    Orientation  Overall Orientation Status: Within Functional Limits           Assessment     Decreased functional mobility ; Decreased ADL status; Decreased strength; Decreased cognition; Decreased balance; Decreased vision/visual deficit; Decreased high-level IADLs  Pt has deficits in ADLs and IADLs due to above deficits. Pt has 3 wheelchairs, but he does not have a R elevated foot rest and will need one at D/C. Pt stated that he had a CVA four years ago which has affected his memory and processing skills. Pt also has glaucoma and requires glasses at all times. Pt requires Mod A with LB dressing and will require AE. Pt requires Min A for stand pivot transfers. Pt would benefit from skilled OT to address these deficits and return the pt home with assist from wife and [address] [place] [address] Reinier Bowden OT.    Activity

## 2017-12-07 NOTE — H&P
Right 12/1/2017    RIGHT BELOW KNEE AMPUTATION performed by Dante Lopez MD at [address]  01/29/2015    23 mm mechanical valve, mitral valve annuloplasty repair w/30 mm Physio and tricuspid ring annuloplasty repair w/34 mm MC3 ring    [facility]  [facility]    EF60%    CARDIAC CATHETERIZATION  12/16/14  [facility]    right heart cath EF 60%    CORONARY ANGIOPLASTY WITH STENT PLACEMENT  12/18/14  [facility]    to RCA    LEG AMPUTATION BELOW KNEE Right 11/28/2017     RIGHT OPEN BELOW KNEE AMPUTATION performed by Dante Lopez MD at [address] [facility] OR       Medications in Hospital:      Current Facility-Administered Medications:     acetaminophen (TYLENOL) tablet 650 mg, 650 mg, Oral, Q4H PRN, Rodrigue Morgan MD    HYDROcodone-acetaminophen (NORCO) 5-325 MG per tablet 1 tablet, 1 tablet, Oral, Q4H PRN **OR** HYDROcodone-acetaminophen (NORCO) 5-325 MG per tablet 2 tablet, 2 tablet, Oral, Q4H PRN, Rodrigue Morgan MD, 2 tablet at 12/07/17 0320    ondansetron (ZOFRAN) injection 4 mg, 4 mg, Intravenous, Q6H PRN, Rodrigue Morgan MD    glucagon (rDNA) injection 1 mg, 1 mg, Intramuscular, PRN, Rodrigue Morgan MD    glucose (GLUTOSE) 40 % oral gel 15 g, 15 g, Oral, PRN, Rodrigue Morgan MD    insulin lispro (HUMALOG) injection vial 0-18 Units, 0-18 Units, Subcutaneous, TID WC, Rodrigue Morgan MD    insulin lispro (HUMALOG) injection vial 0-9 Units, 0-9 Units, Subcutaneous, Nightly, Rodrigue Morgan MD, 6 Units at 12/06/17 2232    magnesium hydroxide (MILK OF MAGNESIA) 400 MG/5ML suspension 30 mL, 30 mL, Oral, Daily PRN, Rodrigue Morgan MD    ondansetron [facility]) injection 4 mg, 4 mg, Intravenous, Q6H PRN, Rodrigue Morgan MD    allopurinol (ZYLOPRIM) tablet 100 mg, 100 mg, Oral, BID, Rodrigue Morgan MD, 100 mg at 12/06/17 2017    aspirin chewable tablet 81 mg, 81 mg, Oral, Daily, Rodrigue Morgan MD    atorvastatin (LIPITOR) tablet 40 mg, 40 mg, Oral, Nightly, Cam Beech Luis A Matos MD, 40 mg at 12/06/17 2018    calcium carbonate (TUMS) chewable tablet 500 mg, 500 mg, Oral, Q4H PRN, Saba Razo MD    enoxaparin (LOVENOX) injection 80 mg, 1 mg/kg, Subcutaneous, BID, Saba Razo MD, 80 mg at 12/06/17 2016    FLUoxetine (PROZAC) capsule 20 mg, 20 mg, Oral, Nightly, Saba Razo MD, 20 mg at 12/06/17 2017    gabapentin (NEURONTIN) capsule 300 mg, 300 mg, Oral, BID, Saba Razo MD, 300 mg at 12/06/17 2017    guaiFENesin [facility]) extended release tablet 1,200 mg, 1,200 mg, Oral, BID, Saba Razo MD, 1,200 mg at 12/06/17 2016    insulin glargine (LANTUS) injection vial 47 Units, 47 Units, Subcutaneous, Nightly, Saba Razo MD, 47 Units at 12/06/17 2230    lactulose (CHRONULAC) 10 GM/15ML solution 20 g, 20 g, Oral, Daily, Saba Razo MD    latanoprost (XALATAN) 0.005 % ophthalmic solution 1 drop, 1 drop, Both Eyes, Nightly, Saba Razo MD, 1 drop at 12/06/17 2230    levothyroxine (SYNTHROID) tablet 75 mcg, 75 mcg, Oral, Daily, Saba Razo MD, 75 mcg at 12/07/17 0493    metoprolol succinate (TOPROL XL) extended release tablet 25 mg, 25 mg, Oral, Daily, Saba Razo MD    pantoprazole (PROTONIX) tablet 40 mg, 40 mg, Oral, QAM AC, Saba Razo MD, 40 mg at 12/07/17 0527    tamsulosin (FLOMAX) capsule 0.4 mg, 0.4 mg, Oral, Daily, Saba Razo MD, 0.4 mg at 12/06/17 2200    tiotropium (SPIRIVA) inhalation capsule 18 mcg, 18 mcg, Inhalation, Daily, Saba Razo MD    traZODone (DESYREL) tablet 100 mg, 100 mg, Oral, Nightly, Saba Razo MD, 100 mg at 12/06/17 2017    [START ON 12/4/2018] warfarin (COUMADIN) daily dosing (placeholder), , Other, RX Placeholder, Saba Razo MD    warfarin (COUMADIN) tablet 5 mg, 5 mg, Oral, Daily, Saba Razo MD, 5 mg at 12/06/17 2200    acetaminophen (TYLENOL) tablet 650 mg, 650 mg, Oral, Q4H PRN, Aiden German MD    magnesium hydroxide (MILK OF MAGNESIA) 400 MG/5ML suspension 30 mL, 30 mL, Oral, Daily PRN, Blaise Lay MD    docusate sodium (COLACE) capsule 100 mg, 100 mg, Oral, BID, Blaise Lay MD    bisacodyl (DULCOLAX) suppository 10 mg, 10 mg, Rectal, Daily PRN, Blaise Lay MD    polyethylene glycol [facility]) packet 17 g, 17 g, Oral, Daily, Blaise Lay MD    traMADol Nurys Pal) tablet 50 mg, 50 mg, Oral, 3 times per day **OR** traMADol (ULTRAM) tablet 100 mg, 100 mg, Oral, 3 times per day, Blaise Lay MD, 100 mg at 12/07/17 5770    Allergies:  Prazosin    Social History:   TOBACCO:   reports that he has never smoked. He has never used smokeless tobacco.  ETOH:   reports that he does not drink alcohol. Family History:       Problem Relation Age of Onset    Parkinsonism Mother     Heart Disease Father     COPD Father     COPD Sister     Heart Disease Brother            Physical Exam:    Vitals: /60   Pulse 70   Temp 97.8 °F (36.6 °C)   Resp 16   Ht 5' 11\" (1.803 m)   SpO2 94%   BMI 27.12 kg/m²     Constitutional  well developed, well nourished. Eyes  conjunctiva normal.  Pupils react to light  Ear, nose, throat -hearing intact to finger rub No scars, masses, or lesions over external nose or ears, no atrophy of tongue  Neck-symmetric, no masses noted, no jugular vein distension  Respiration- chest wall appears symmetric, good expansion,   normal effort without use of accessory muscles  Cardiovascular- RRR without m,r,g  Musculoskeletal  no significant wasting of muscles noted Except bilateral FDI, no bony deformities  Extremities-no clubbing, cyanosis or edema  Skin  warm, dry, and intact. No rash, erythema, or pallor.   Psychiatric  mood, affect, and behavior appear normal.      Neurological exam  Awake, alert, fluent oriented x 3 appropriate affect  Attention and concentration appear appropriate  Recent and remote memory appears unremarkable  Speech normal without dysarthria  No clear issues with language of fund of knowledge    Cranial Nerve Exam   CN II- Visual fields grossly unremarkable  CN III, IV,VI-EOMI, No nystagmus, conjugate eye movements, no ptosis  CN VII-no facial assymetry  CN VIII-Hearing intact to finger rub  CN IX and X- Palate elevates in midline  CN XI-good shoulder shrug  CN XII-Tongue midline with no fasciculations or fibrillations    Motor Exam  V/V throughout upper and lower extremities bilaterally, no cogwheeling, normal tone    Sensory Exam  Decreased vibration to the left ankle and pinprick to the left mid calf    Reflexes   Absent throughout  No clonus ankles  No Shaw's sign bilateral hands    Tremors- no tremors in hands or head noted    Gait  Not tested    Coordination  Finger to nose-unremarkable        CBC:   Recent Labs      12/05/17 0425 12/06/17 0416 12/07/17 0441   WBC  7.8  6.8  7.0   HGB  7.2*  6.8*  7.6*   PLT  207  196  202       BMP:    Recent Labs      12/07/17 0441   NA  139   K  4.0   CL  103   CO2  28   BUN  14   CREATININE  0.9   GLUCOSE  109       Hepatic: No results for input(s): AST, ALT, ALB, BILITOT, ALKPHOS in the last 72 hours. Lipids: No results for input(s): CHOL, HDL in the last 72 hours. Invalid input(s): LDLCALCU    INR:   Recent Labs      12/05/17 0425 12/06/17 0416 12/07/17 0441   INR  1.18  1.31*  1.48*           Assessment and Plan     1. Right below-knee amputation with continued severe painpain control/PT/OT  2. Diabetes mellitus on sliding scalemonitor. Stable  3. GIbowel regimen  4. History of mechanical heart valve on chronic anticoagulation. Currently on full dose Lovenox and Coumadin  5. HyperlipidemiaLipitor. Stable  6. History of goutallopurinol. Stable  7. BPHFlomax. Stable  8. Chronic kidney disease stage III. Monitor.  Stable        Patient's functional assessment  Prior to hospitalization the patient was continent of bowel and bladder    Functional assessment  All notes from reehab data were reviewed regarding the patient's functional status. Current therapy  Requires PT, OT and/or speech therapy    Anticipated discharge approximately 13 days    Anticipated discharge setting  Home with home care    No clear barriers to discharge    The patient appears to be an appropriate candidate for inpatient rehabilitation    Sufficiently stable: Patient's condition is sufficiently stable at the time of admission to allow the patient to actively participate in an intensive rehabilitation program    Close medical supervision: A rehabilitation physician, or other licensed treating physician with specialized training and experience in inpatient rehabilitation, will conduct face-to-face visits with the patient a minimum of at least 3 days per week throughout the patient's stay    This patient requires close medical supervision for the active management of the ongoing conditions and potential complications stated in the admission note    Intensive rehabilitation nursing: The patient demonstrates the need for 24-hour rehabilitation nursing care for active management of the multiple medical issues documented in the admission note    Appropriate therapy needed: The patient requires the active and ongoing therapeutic intervention of at least 2 therapeutic disciplines, one of which must be physical or occupational therapy and/or speech therapy    Intensive therapy: The patient requires and is reasonably expected to actively participate in at least 3 hours of therapy per day at least 5 days per week, and expected to make measurable improvements that will be of practical value to improve the patient's functional capacity or adaptation to impairments.  In addition, therapy treatments will begin within 36 hours from midnight of the day of the patient's admission to the inpatient rehabilitation facility    Expected duration and frequency therapy: 180 minutes per day, 5 days per week    Interdisciplinary team: The patient demonstrates the need for an interdisciplinary team for active management of the following medical issues including ataxia, motor planning, balance, disease management, elimination, endurance, family training, education, independent ADLs, pain management, precautions, range of motion, safety, strength, and transfers    I have reviewed the preadmission screening documents and concur with the findings. I believe the patient meets criteria and is sufficiently stable to allow participation in the program. This requires an intensive level of therapy, close medical supervision, and an interdisciplinary team approach provided through an individualized plan of care. I approve admitting this patient for an intensive inpatient rehabilitation program.      Vannesa Milton.  Lalita Rosenbaum MD

## 2017-12-07 NOTE — PLAN OF CARE
[address] [facility] TREATMENT PLAN      Melissa Marcus    : 1950  Acct #: [[id number]]  MRN: [id number]   PHYSICIAN:  Onur La MD  Primary Problem    Patient Active Problem List   Diagnosis    History of heart valve replacement with mechanical valve    Type 2 diabetes mellitus with foot ulcer, with long-term current use of insulin ([facility]    Acquired hypothyroidism    Essential hypertension    Mixed hyperlipidemia    Glaucoma    [facility]sema [facility]    Stage 3 chronic kidney disease    Cirrhosis of liver ([facility]    Acute blood loss as cause of postoperative anemia    S/P BKA (below knee amputation), right [facility])       Rehabilitation Diagnosis:     S/P BKA (below knee amputation) bilateral (HCC) [V71.030, Z89.511]  S/P BKA (below knee amputation) bilateral ([facility] [H67.204, Z89.511]       ADMIT DATE:2017   CARE PLAN     NURSING:  Melissa Marcus while on this unit will:     [x] Be continent of bowel and bladder      [x] Have an adequate number of bowel movements   [x] Urinate with no urinary retention >300ml in bladder   [] Complete bladder protocol with keita removal   [x] Maintain O2 SATs at ___%   [x] Have pain managed while on ARU        [x] Be pain free by discharge    [x] Have no skin breakdown while on ARU   [x] Have improved skin integrity via wound measurements   [x] Have no signs/symptoms of infection at the wound site  [x] Be free from injury during hospitalization   [x] Complete education with patient/family with understanding demonstrated for:  [] Adjustment   [] Other:   Nursing interventions may include bowel/bladder training, education for medical assistive devices, medication education, O2 saturation management, energy conservation, stress management techniques, fall prevention, alarms protocol, seating and positioning, skin/wound care, pressure relief instruction,dressing changes,  infection protection, DVT prophylaxis, and/or assistance with in room safety with transfers to bed, toilet, wheelchair, shower as well as bathroom activities and hygiene. Patient/caregiver education for:   [] Disease/sustained injury/management      [] Medication Use   [] Surgical intervention   [] Safety   [] Body mechanics and or joint protection   [] Health maintenance         PHYSICAL THERAPY:  Goals:                  Short term goals  Time Frame for Short term goals: 1 WEEK  Short term goal 1: TRANSFER FIM TO 5  Short term goal 2: AMB FIM TO 1  Short term goal 3: W/C FIM TO 5  Short term goal 5: MIN A HEP            Long term goals  Time Frame for Long term goals : 2 WEEKS  Long term goal 1: TRANSFER FIM TO 6  Long term goal 2: AMB FIM TO 5E  Long term goal 3: W/C FIM TO 6  Long term goal 4: STEPS FIM TO 1  Long term goal 5: INDEP HEP  These goals were reviewed with this patient at the time of assessment and Sae Bledsoe is in agreement. Plan of Care: Pt to be seen 5 days per week for a minimum of 60 minutes. Current Treatment Recommendations: Strengthening, ROM, Balance Training, Functional Mobility Training, Transfer Training, Endurance Training, Wheelchair Mobility Training, Gait Training, Stair training, Home Exercise Program, Safety Education & Training, Patient/Caregiver Education & Training, Equipment Evaluation, Education, & procurement      OCCUPATIONAL THERAPY:  Goals:             Short term goals  Time Frame for Short term goals: 1 week   Short term goal 1: Min A with LB dressing with AE. Short term goal 2: Supervision with toilet transfer. Short term goal 3: Supervision with toileting. Short term goal 4: Pt will complete one handed static standing task for 2 minutes with CGA. Short term goal 5: Pt will complete light meal prep at w/c level with supervision.  :  Long term goals  Time Frame for Long term goals : 2 weeks   Long term goal 1: Modified independent with overall dressing.    Long term goal 2: Modified independent with overall toileting. Long term goal 3: Modified independent with bathing hygiene. Long term goal 4: Independent with HEP. Long term goal 5: Pt verbalize DME needs.  :    These goals were reviewed with this patient at the time of assessment and Mark Severino is in agreement    Plan of Care:             Plan  Current Treatment Recommendations: Strengthening, Balance Training, Functional Mobility Training, Endurance Training, Equipment Evaluation, Education, & procurement, Patient/Caregiver Education & Training, Safety Education & Training, Self-Care / ADL, Home Management Training          Treatments may include IADL retraining, strengthening, safety education and training, patient/caregiver education and training, equipment evaluation/ training/procurement, neuromuscular reeducation, wheelchair mobility training. CASE MANAGEMENT:  Goals:   Assist patient/family with discharge planning, patient/family counseling,  and coordination with insurance during ARU stay.   Patient Goals: improve healing, transfer independently, be home soon     Current  FIMS and Goals:   Self-Care  Eatin - Feeds self with adaptive equipment/dentures and/or feeds self with modified diet and/or performs own tube feeding  GOAL: Eatin  Groomin - Did not occur  GOAL: Groomin  Bathin - Did not occur  GOAL: Bathin  Dressing-Upper: 0 - Did not occur  GOAL: Dressing-Upper: 7  Dressing-Lower: 0 - Did not occur  GOAL: Dressing-Lower: 6  Toiletin - Requires steadying assistance only  GOAL: Toiletin  Bowel Level of Assistance: 0- Activity does not occur (Use this code only at admission)  Transfers  Bed, Chair, Wheel Chair: 4 - Requires steadying assistance only <25% assist  and/or requires assist with one leg only  GOAL: Bed, Chair, Wheel Chair: 6  Toilet Transfer: 4 - Requires steadying assistance only < 25% assist  GOAL: Toilet: 6  Primary Mode: Shower  GOAL: Tub, Shower: 6  Tub Transfer: 0 - Activity does not anticoagulation. Currently on full dose Lovenox and Coumadin  5. HyperlipidemiaLipitor. Stable  6. History of goutallopurinol. Stable  7. BPHFlomax. Stable  8. Chronic kidney disease stage III. Monitor.  Stable            Case Mgmt: Electronically signed by URSULA Freeman on 12/8/2017 at 9:40 AM      OT: Electronically signed by Jim Diaz OT on 12/7/2017 at 2:59 PM    PT:Electronically signed by Sabra Weldon PT on 12/7/2017 at 11:55 AM    RN: Electronically signed by Clive Orr RN on 12/6/2017 at 7:47 PM

## 2017-12-07 NOTE — PROGRESS NOTES
Physical Therapy EVALUATION Note  DATE:  2017  NAME:  Logan Evangelista  :  1950  (69 y.o.,male)  MRN:  [id number]    HEIGHT:  Height: 5' 11\" (180.3 cm)  WEIGHT:       PATIENT DIAGNOSIS(ES):    Diagnosis: S/P RIGHT BELOW KNEE AMPUTATION  RESTRICTIONS/PRECAUTIONS:    Restrictions/Precautions: Fall Risk, Weight Bearing  Right Lower Extremity Weight Bearing: Non Weight Bearing  Left Lower Extremity Weight Bearing: Weight Bearing As Tolerated  Required Braces or Orthoses?: Yes  OVERALL  ORIENTATION STATUS:     PAIN:  Pain Level: 8  NEUROLOGICAL   DECREASED SENSATION LEFT FOOT AND ANKLE           POSTURE     STRENGTH  Strength RLE  Strength RLE: Exception  Comment: HIP 3/5 GROSSLY, KNEE NOT TESTED  Strength LLE  Strength LLE: Exception  Comment: 4/5 HIP, 4+/5 KNEE, 4/5 PLANTAR, 3+/5 DORSI  ROM  AROM RLE (degrees)  RLE AROM:  (KNEE NOT TESTED WFL HIP)  AROM LLE (degrees)  LLE AROM : WFL  BALANCE  Sitting - Static: Good  Standing - Dynamic: Fair     ACTIVITY TOLERANCE  Activity Tolerance: Patient limited by pain, Patient limited by endurance     BED MOBILITY  Rolling: Supervision  Supine to Sit: Supervision  Sit to Supine: Supervision  TRANSFERS  Sit to Stand: Contact guard assistance  Bed to Chair: Contact guard assistance     WHEELCHAIR PROPULSION  Level of Assistance: Supervision  Distance: 150  Description/ Details: ABLE OT PERFROM 2 TURNS  AMBULATION  Device: Parallel Bars  Assistance: Minimal assistance  Distance: 5  Quality of Gait: DECREASED STRENGTH RLE WITH UNSTABLE STANCE FOLLOWING SWING PHASE LLE  STAIRS   UNABLE TO PERFORM           FIM SCORES    CURRENT  Bed, Chair, Wheel Chair: 4 - Requires steadying assistance only <25% assist  and/or requires assist with one leg only  Walk: 1 - Total Assistance Walks/operates wheelchair < 50 feet OR requires two or more people OR patient performs < 25% of locomotion effort  Wheel Chair: 2 - Maximal Assistance Requires up to Maximal Assistance AND requires Training, Endurance Training, Wheelchair Mobility Training, Gait Training, Stair training, Home Exercise Program, Safety Education & Training, Patient/Caregiver Education & Training, Equipment Evaluation, Education, & procurement  PATIENT REQUIRES AND IS REASONABLY EXPECTED TO ACTIVELY PARTICIPATE IN AT LEAST 3 HOURS OF INTENSIVE THERAPY PER DAY AT LEAST 5 DAYS PER WEEK, AND BE EXPECTED TO MAKE MEASURABLE IMPROVEMENT THAT WILL BE OF PRACTICAL VALUE TO IMPROVE THE PATIENT'S FUNCTIONAL CAPACITY OR ADAPTATION TO IMPAIRMENTS.    PATIENT GOAL FOR REHAB:  RETURN TO PRIOR LEVEL OF FUNCTION   DISCHARGE PLANS:  Discharge Recommendations: Home with Home health PT  ELOS:  2 WEEKS        IRF/DIANA    ROLLING  Roll Left and Right  Assistance Needed: Supervision  Physical Assistance Level: No physical assistance  CARE Score: 4  SIT TO SUPINE  Sit to Lying  Assistance Needed: Supervision  Physical Assistance Level: No physical assistance  CARE Score: 4  SUPINE TO SIT  Lying to Sitting on Side of Bed  Assistance Needed: Supervision  Physical Assistance Level: No physical assistance  CARE Score: 4  SIT TO STAND  Sit to Stand  Assistance Needed: Physical assistance  Physical Assistance Level: Less than 25%  CARE Score: 3  TRANSFERS  Chair/Bed-to-Chair Transfer  Assistance Needed: Incidental touching  Physical Assistance Level: Less than 25%  CARE Score: 3  CAR TRANSFERS  Car Transfer  Reason if not Attempted: Safety concerns  CARE Score: 88  WALK 10 FT  Walk 10 Feet  Assistance Needed: Physical assistance  Physical Assistance Level: 25%-49%  CARE Score: 3  WALK 50 FT  Walk 50 Feet with Two Turns  Reason if not Attempted: Safety concerns  CARE Score: 88  WALK 150 FT  Walk 150 Feet  Reason if not Attempted: Safety concerns  CARE Score: 88  WALK 10 FT UNEVEN SURFACES  Walking 10 Feet on Uneven Surfaces  Reason if not Attempted: Safety concerns  CARE Score: 88  1 STEP  1 Step (Curb)  Reason if not Attempted: Safety concerns  CARE Score:

## 2017-12-08 LAB
GLUCOSE BLD-MCNC: 102 MG/DL (ref 70–99)
GLUCOSE BLD-MCNC: 128 MG/DL (ref 70–99)
GLUCOSE BLD-MCNC: 184 MG/DL (ref 70–99)
GLUCOSE BLD-MCNC: 90 MG/DL (ref 70–99)
INR BLD: 1.7 (ref 0.88–1.18)
PERFORMED ON: ABNORMAL
PERFORMED ON: NORMAL
PROTHROMBIN TIME: 20 SEC (ref 12–14.6)

## 2017-12-08 PROCEDURE — 82948 REAGENT STRIP/BLOOD GLUCOSE: CPT

## 2017-12-08 PROCEDURE — 97530 THERAPEUTIC ACTIVITIES: CPT

## 2017-12-08 PROCEDURE — 6370000000 HC RX 637 (ALT 250 FOR IP): Performed by: FAMILY MEDICINE

## 2017-12-08 PROCEDURE — 85610 PROTHROMBIN TIME: CPT

## 2017-12-08 PROCEDURE — 99232 SBSQ HOSP IP/OBS MODERATE 35: CPT | Performed by: PSYCHIATRY & NEUROLOGY

## 2017-12-08 PROCEDURE — 97116 GAIT TRAINING THERAPY: CPT

## 2017-12-08 PROCEDURE — 6370000000 HC RX 637 (ALT 250 FOR IP): Performed by: PSYCHIATRY & NEUROLOGY

## 2017-12-08 PROCEDURE — 6360000002 HC RX W HCPCS: Performed by: FAMILY MEDICINE

## 2017-12-08 PROCEDURE — 1180000000 HC REHAB R&B

## 2017-12-08 PROCEDURE — 2700000000 HC OXYGEN THERAPY PER DAY

## 2017-12-08 PROCEDURE — 97110 THERAPEUTIC EXERCISES: CPT

## 2017-12-08 PROCEDURE — 97535 SELF CARE MNGMENT TRAINING: CPT

## 2017-12-08 PROCEDURE — 36415 COLL VENOUS BLD VENIPUNCTURE: CPT

## 2017-12-08 RX ORDER — TRAMADOL HYDROCHLORIDE 50 MG/1
50 TABLET ORAL EVERY 6 HOURS PRN
Status: DISCONTINUED | OUTPATIENT
Start: 2017-12-08 | End: 2017-12-18 | Stop reason: HOSPADM

## 2017-12-08 RX ADMIN — INSULIN LISPRO 4 UNITS: 100 INJECTION, SOLUTION INTRAVENOUS; SUBCUTANEOUS at 18:26

## 2017-12-08 RX ADMIN — ATORVASTATIN CALCIUM 40 MG: 40 TABLET, FILM COATED ORAL at 21:14

## 2017-12-08 RX ADMIN — OXYCODONE HYDROCHLORIDE 15 MG: 5 TABLET ORAL at 01:59

## 2017-12-08 RX ADMIN — TRAMADOL HYDROCHLORIDE 100 MG: 50 TABLET, COATED ORAL at 05:59

## 2017-12-08 RX ADMIN — LATANOPROST 1 DROP: 50 SOLUTION OPHTHALMIC at 21:13

## 2017-12-08 RX ADMIN — OXYCODONE HYDROCHLORIDE 10 MG: 10 TABLET, FILM COATED, EXTENDED RELEASE ORAL at 08:36

## 2017-12-08 RX ADMIN — TAMSULOSIN HYDROCHLORIDE 0.4 MG: 0.4 CAPSULE ORAL at 08:33

## 2017-12-08 RX ADMIN — OXYCODONE HYDROCHLORIDE 15 MG: 5 TABLET ORAL at 06:00

## 2017-12-08 RX ADMIN — ASPIRIN 81 MG CHEWABLE TABLET 81 MG: 81 TABLET CHEWABLE at 08:33

## 2017-12-08 RX ADMIN — ALLOPURINOL 100 MG: 100 TABLET ORAL at 08:33

## 2017-12-08 RX ADMIN — TRAZODONE HYDROCHLORIDE 100 MG: 50 TABLET, FILM COATED ORAL at 21:13

## 2017-12-08 RX ADMIN — GABAPENTIN 300 MG: 300 CAPSULE ORAL at 21:13

## 2017-12-08 RX ADMIN — WARFARIN SODIUM 5 MG: 5 TABLET ORAL at 18:32

## 2017-12-08 RX ADMIN — GUAIFENESIN 1200 MG: 600 TABLET, EXTENDED RELEASE ORAL at 21:13

## 2017-12-08 RX ADMIN — INSULIN GLARGINE 47 UNITS: 100 INJECTION, SOLUTION SUBCUTANEOUS at 21:13

## 2017-12-08 RX ADMIN — TRAMADOL HYDROCHLORIDE 50 MG: 50 TABLET, COATED ORAL at 13:44

## 2017-12-08 RX ADMIN — OXYCODONE HYDROCHLORIDE 15 MG: 5 TABLET ORAL at 15:49

## 2017-12-08 RX ADMIN — ENOXAPARIN SODIUM 80 MG: 80 INJECTION SUBCUTANEOUS at 21:13

## 2017-12-08 RX ADMIN — GUAIFENESIN 1200 MG: 600 TABLET, EXTENDED RELEASE ORAL at 08:32

## 2017-12-08 RX ADMIN — ALLOPURINOL 100 MG: 100 TABLET ORAL at 21:14

## 2017-12-08 RX ADMIN — ENOXAPARIN SODIUM 80 MG: 80 INJECTION SUBCUTANEOUS at 08:33

## 2017-12-08 RX ADMIN — ACETAMINOPHEN 650 MG: 325 TABLET ORAL at 04:05

## 2017-12-08 RX ADMIN — OXYCODONE HYDROCHLORIDE 15 MG: 5 TABLET ORAL at 10:00

## 2017-12-08 RX ADMIN — LACTULOSE 20 G: 20 SOLUTION ORAL at 08:40

## 2017-12-08 RX ADMIN — FLUOXETINE HYDROCHLORIDE 20 MG: 20 CAPSULE ORAL at 21:14

## 2017-12-08 RX ADMIN — OXYCODONE HYDROCHLORIDE 15 MG: 5 TABLET ORAL at 21:14

## 2017-12-08 RX ADMIN — METOPROLOL SUCCINATE 25 MG: 50 TABLET, FILM COATED, EXTENDED RELEASE ORAL at 08:34

## 2017-12-08 RX ADMIN — PANTOPRAZOLE SODIUM 40 MG: 40 TABLET, DELAYED RELEASE ORAL at 06:00

## 2017-12-08 RX ADMIN — GABAPENTIN 300 MG: 300 CAPSULE ORAL at 08:33

## 2017-12-08 RX ADMIN — TIOTROPIUM BROMIDE 18 MCG: 18 CAPSULE ORAL; RESPIRATORY (INHALATION) at 08:32

## 2017-12-08 RX ADMIN — LEVOTHYROXINE SODIUM 75 MCG: 50 TABLET ORAL at 06:00

## 2017-12-08 ASSESSMENT — PAIN DESCRIPTION - FREQUENCY
FREQUENCY: CONTINUOUS

## 2017-12-08 ASSESSMENT — PAIN DESCRIPTION - LOCATION
LOCATION: LEG
LOCATION: LEG;FOOT
LOCATION: LEG

## 2017-12-08 ASSESSMENT — PAIN SCALES - GENERAL
PAINLEVEL_OUTOF10: 0
PAINLEVEL_OUTOF10: 0
PAINLEVEL_OUTOF10: 8
PAINLEVEL_OUTOF10: 6
PAINLEVEL_OUTOF10: 0
PAINLEVEL_OUTOF10: 9
PAINLEVEL_OUTOF10: 9
PAINLEVEL_OUTOF10: 0
PAINLEVEL_OUTOF10: 8
PAINLEVEL_OUTOF10: 5
PAINLEVEL_OUTOF10: 8
PAINLEVEL_OUTOF10: 9
PAINLEVEL_OUTOF10: 6
PAINLEVEL_OUTOF10: 5
PAINLEVEL_OUTOF10: 6
PAINLEVEL_OUTOF10: 7
PAINLEVEL_OUTOF10: 0

## 2017-12-08 ASSESSMENT — PAIN DESCRIPTION - PROGRESSION
CLINICAL_PROGRESSION: NOT CHANGED

## 2017-12-08 ASSESSMENT — PAIN DESCRIPTION - PAIN TYPE
TYPE: PHANTOM PAIN

## 2017-12-08 ASSESSMENT — PAIN DESCRIPTION - DESCRIPTORS
DESCRIPTORS: BURNING
DESCRIPTORS: BURNING
DESCRIPTORS: BURNING;ACHING

## 2017-12-08 ASSESSMENT — PAIN DESCRIPTION - ONSET: ONSET: ON-GOING

## 2017-12-08 ASSESSMENT — PAIN DESCRIPTION - ORIENTATION
ORIENTATION: RIGHT;LOWER
ORIENTATION: RIGHT
ORIENTATION: RIGHT;LOWER

## 2017-12-08 NOTE — PROGRESS NOTES
[id number] [facility] Stephy Mendoza has been consulted to review medication profile for fall risk.     Medications increasing risk of falling: Tramadol, Oxycodone ER and Oxycodone IR  Medications increasing risk of bleeding: Lovenox and Warfarin    JENNA HAWKINS D, 12/8/2017, 1:32 PM

## 2017-12-08 NOTE — PROGRESS NOTES
Nutrition Assessment    Type and Reason for Visit: Initial    Nutrition Recommendations: continue POC. Need current ht/wt. Malnutrition Assessment:  · Malnutrition Status: No malnutrition    Nutrition Diagnosis:   · Problem: Increased nutrient needs  · Etiology: related to Increased demand for energy/nutrients due to     Signs and symptoms:  as evidenced by Presence of wounds    Nutrition Assessment:  · Subjective Assessment: Appetite is good. States restricts Na. · Nutrition-Focused Physical Findings:    · Wound Type: Surgical Wound  · Current Nutrition Therapies:  · Oral Diet Orders:     · Oral Diet intake: 51-75%  · Anthropometric Measures:  · Ht: 5' 11\" (180.3 cm)   · Current Body Wt:    · Admission Body Wt:  (none recorded)  · Usual Body Wt:    · % Weight Change:  ,     · Ideal Body Wt: 172 lb (78 kg), % Ideal Body    · Adjusted Body Wt:  , body weight adjusted for    · BMI Classification:  not available    Estimated Intake vs Estimated Needs: Intake Meets Needs    Nutrition Risk Level: Low    Nutrition Interventions:   Continue current diet  Continued Inpatient Monitoring    Nutrition Evaluation:   · Evaluation: Goals set   · Goals: po 75% or more, glucose 150 or below    · Monitoring: Meal Intake, Wound Healing, Pertinent Labs, Weight    See Adult Nutrition Doc Flowsheet for more detail.      Electronically signed by Kalli Lacy MS, RD, LD on 12/8/17 at 8:39 AM    Contact Number: [phone]

## 2017-12-08 NOTE — PLAN OF CARE
Problem: Falls - Risk of  Goal: Absence of falls  Outcome: Ongoing  Patient has Personal and Bed Alarm in place for safety. Problem: Risk for Impaired Skin Integrity  Goal: Tissue integrity - skin and mucous membranes  Structural intactness and normal physiological function of skin and  mucous membranes. Outcome: Ongoing  Skin remains intact with no skin breakdown noted. Problem: Pain:  Goal: Pain level will decrease  Pain level will decrease   Outcome: Ongoing  Pain medications given as ordered scheduled and PRN. Will continue to monitor pain levels.

## 2017-12-08 NOTE — PLAN OF CARE
Problem: Falls - Risk of  Goal: Absence of falls  Outcome: Ongoing  Patient has Personal and Bed Alarm in place for safety. Problem: Risk for Impaired Skin Integrity  Goal: Tissue integrity - skin and mucous membranes  Structural intactness and normal physiological function of skin and  mucous membranes. Outcome: Ongoing  Skin remains intact with no skin breakdown noted. Problem: Pain:  Goal: Pain level will decrease  Pain level will decrease   Outcome: Ongoing  No c/o pain this shift. Will continue to monitor pain level.

## 2017-12-08 NOTE — PROGRESS NOTES
Patient:   Kerrie Gomes  MR#:    [id number]   Room:    [facility]/826-01   YOB: 1950  Date of Progress Note: 12/8/2017  Time of Note                           12:15 PM  Consulting Physician:   Martina Higgins M.D. Attending Physician:  Martina Higgins MD     CHIEF COMPLAINT:  Generalized weakness with right lower extremity pain status post right BKA         Subjective: This 69 y.o. male  with DM type II, who has had a wound on his right leg that started 4 months ago. It had been non healing. On 11/27/17 he had increased swelling,redness and odor around the wound. He contacted wound care center and was made a direct admit to Dr. Erna Chaves. He was found to have cellulitus of a right lower extremity open wound with achilles tendon visualized. He was placed on IV Vancomycin and meropenem. He was taken to OR on 11/28/17 by Dr. Erna Chaves for evaluation of his wound. He was found to have a massive infection with necrotic muscle and tendon lower leg. The decison was made for an open below the knee amputation. The wound was left open with planned staged revision in 48-72 hours. He tolerated the procedure well. He returned to surgery on 12/1/17 for revsion on Rt BKA. He tolerated the procedure well. He has had post op anemia and has required transfusions. Will need continued monitoring of his H&H. He had complaints of diarrhea on 12/3/17, C-Diff was negative. CT of abdomen done showed possible colitis. He was started on IV Flagy and diarrhea improved. He has c/o of some dyspnea, repeat chest x-ray done 12/5/17 showed worsening airspace opacities in the lower lobes could be due to pulmonary edema or pneumonia. Pain much better controlled since switching from hydrocodone to oxycodone. No complaints today.   REVIEW OF SYSTEMS:  Constitutional: No fevers No chills  Neck:No stiffness  Respiratory: No shortness of breath  Cardiovascular: No chest pain No palpitations  Gastrointestinal: No abdominal pain    Genitourinary: No Dysuria  Neurological: No headache, no confusion      PHYSICAL EXAM:  BP (!) 110/59   Pulse 86   Temp 98 °F (36.7 °C) (Temporal)   Resp 20   Ht 5' 11\" (1.803 m)   SpO2 95%   BMI 27.12 kg/m²     Constitutional: he appears well-developed and well-nourished. Eyes  conjunctiva normal.  Pupils react to light  Ear, nose, throat -hearing intact to finger rub No scars, masses, or lesions over external nose or ears, no atrophy of tongue  Neck-symmetric, no masses noted, no jugular vein distension  Respiration- chest wall appears symmetric, good expansion,   normal effort without use of accessory muscles  Musculoskeletal  no significant wasting of muscles noted, no bony deformities, gait no gross ataxia  Extremities-no clubbing, cyanosis or edema  Skin  warm, dry, and intact. No rash, erythema, or pallor. Psychiatric  mood, affect, and behavior appear normal.      Neurology  NEUROLOGICAL EXAM:      Mental status   Awake, alert, fluent oriented x 3 appropriate affect  Attention and concentration appear appropriate  Speech normal without dysarthria  No clear issues with language       Cranial Nerves   CN II- Visual fields grossly unremarkable  CN III, IV,VI-EOMI, No nystagmus, conjugate eye movements, no ptosis  CN VII-no facial assymetry  CN VIII-Hearing intact   CN IX and X- Palate elevates in midline  CN XI-good shoulder shrug  CN XII-Tongue midline with no fasciculations or fibrillations          Motor function  Antigravity x 4  With right below knee amputation        Cerebellar F-N intact     Tremor None present     Gait                  Not tested           Nursing/pcp notes, imaging,labs and vitals reviewed.      PT,OT and/or speech notes reviewed    Lab Results   Component Value Date    WBC 7.0 12/07/2017    HGB 7.6 (L) 12/07/2017    HCT 24.5 (L) 12/07/2017    MCV 89.1 12/07/2017     12/07/2017     Lab Results   Component Value Date     12/07/2017    K 4.0 12/07/2017     12/07/2017    CO2 28 12/07/2017    BUN 14 12/07/2017    CREATININE 0.9 12/07/2017    GLUCOSE 109 12/07/2017    CALCIUM 8.1 (L) 12/07/2017    PROT 4.6 (L) 12/04/2017    LABALBU 2.5 (L) 12/04/2017    BILITOT 0.3 12/04/2017    ALKPHOS 89 12/04/2017    AST 25 12/04/2017    ALT 13 12/04/2017    LABGLOM >60 12/07/2017     Lab Results   Component Value Date    INR 1.70 (H) 12/08/2017    INR 1.48 (H) 12/07/2017    INR 1.31 (H) 12/06/2017     Lab Results   Component Value Date    CRP 2.96 (H) 11/13/2017       BED MOBILITY  Rolling: Supervision  Supine to Sit: Supervision  Sit to Supine: Supervision  TRANSFERS  Sit to Stand: Contact guard assistance  Bed to Chair: Contact guard assistance     WHEELCHAIR PROPULSION  Level of Assistance: Supervision  Distance: 150  Description/ Details: ABLE OT PERFROM 2 TURNS  AMBULATION  Device: Parallel Bars  Assistance: Minimal assistance  Distance: 5  Quality of Gait: DECREASED STRENGTH RLE WITH UNSTABLE STANCE FOLLOWING SWING PHASE LLE        RECORD REVIEW: Previous medical records, medications were reviewed at today's visit    IMPRESSION:   1. Right below-knee amputation with continued severe painpain control/PT/OT  2. Diabetes mellitus on sliding scalemonitor. Stable  3. GIbowel regimen  4. History of mechanical heart valve on chronic anticoagulation. Currently on full dose Lovenox and Coumadin  5. HyperlipidemiaLipitor. Stable  6. History of goutallopurinol. Stable  7. BPHFlomax. Stable  8. Chronic kidney disease stage III. Monitor.  Stable      Staff next week

## 2017-12-09 ENCOUNTER — APPOINTMENT (OUTPATIENT)
Dept: GENERAL RADIOLOGY | Age: 67
DRG: 560 | End: 2017-12-09
Attending: PSYCHIATRY & NEUROLOGY
Payer: MEDICARE

## 2017-12-09 LAB
GLUCOSE BLD-MCNC: 136 MG/DL (ref 70–99)
GLUCOSE BLD-MCNC: 140 MG/DL (ref 70–99)
GLUCOSE BLD-MCNC: 253 MG/DL (ref 70–99)
GLUCOSE BLD-MCNC: 89 MG/DL (ref 70–99)
HCT VFR BLD CALC: 24.5 % (ref 42–52)
HEMOGLOBIN: 7.5 G/DL (ref 14–18)
INR BLD: 1.97 (ref 0.88–1.18)
MCH RBC QN AUTO: 27.6 PG (ref 27–31)
MCHC RBC AUTO-ENTMCNC: 30.6 G/DL (ref 33–37)
MCV RBC AUTO: 90.1 FL (ref 80–94)
PDW BLD-RTO: 18.4 % (ref 11.5–14.5)
PERFORMED ON: ABNORMAL
PERFORMED ON: NORMAL
PLATELET # BLD: 234 K/UL (ref 130–400)
PMV BLD AUTO: 10 FL (ref 9.4–12.4)
PROTHROMBIN TIME: 22.5 SEC (ref 12–14.6)
RBC # BLD: 2.72 M/UL (ref 4.7–6.1)
URINE CULTURE, ROUTINE: NORMAL
WBC # BLD: 11 K/UL (ref 4.8–10.8)

## 2017-12-09 PROCEDURE — 6370000000 HC RX 637 (ALT 250 FOR IP): Performed by: FAMILY MEDICINE

## 2017-12-09 PROCEDURE — 1180000000 HC REHAB R&B

## 2017-12-09 PROCEDURE — 6370000000 HC RX 637 (ALT 250 FOR IP): Performed by: PSYCHIATRY & NEUROLOGY

## 2017-12-09 PROCEDURE — 82948 REAGENT STRIP/BLOOD GLUCOSE: CPT

## 2017-12-09 PROCEDURE — 99232 SBSQ HOSP IP/OBS MODERATE 35: CPT | Performed by: PSYCHIATRY & NEUROLOGY

## 2017-12-09 PROCEDURE — 2700000000 HC OXYGEN THERAPY PER DAY

## 2017-12-09 PROCEDURE — 6360000002 HC RX W HCPCS: Performed by: PSYCHIATRY & NEUROLOGY

## 2017-12-09 PROCEDURE — 85027 COMPLETE CBC AUTOMATED: CPT

## 2017-12-09 PROCEDURE — 36415 COLL VENOUS BLD VENIPUNCTURE: CPT

## 2017-12-09 PROCEDURE — 97530 THERAPEUTIC ACTIVITIES: CPT

## 2017-12-09 PROCEDURE — 85610 PROTHROMBIN TIME: CPT

## 2017-12-09 PROCEDURE — 73502 X-RAY EXAM HIP UNI 2-3 VIEWS: CPT

## 2017-12-09 RX ORDER — GABAPENTIN 600 MG/1
600 TABLET ORAL EVERY EVENING
Status: DISCONTINUED | OUTPATIENT
Start: 2017-12-09 | End: 2017-12-10

## 2017-12-09 RX ORDER — GABAPENTIN 300 MG/1
300 CAPSULE ORAL
Status: DISCONTINUED | OUTPATIENT
Start: 2017-12-09 | End: 2017-12-14

## 2017-12-09 RX ORDER — BUMETANIDE 1 MG/1
1 TABLET ORAL DAILY
Status: DISCONTINUED | OUTPATIENT
Start: 2017-12-09 | End: 2017-12-10

## 2017-12-09 RX ORDER — GABAPENTIN 600 MG/1
300 TABLET ORAL 3 TIMES DAILY
Status: DISCONTINUED | OUTPATIENT
Start: 2017-12-09 | End: 2017-12-09 | Stop reason: DRUGHIGH

## 2017-12-09 RX ADMIN — LATANOPROST 1 DROP: 50 SOLUTION OPHTHALMIC at 21:05

## 2017-12-09 RX ADMIN — ALLOPURINOL 100 MG: 100 TABLET ORAL at 21:02

## 2017-12-09 RX ADMIN — TRAZODONE HYDROCHLORIDE 100 MG: 50 TABLET, FILM COATED ORAL at 21:03

## 2017-12-09 RX ADMIN — METOPROLOL SUCCINATE 25 MG: 50 TABLET, FILM COATED, EXTENDED RELEASE ORAL at 09:38

## 2017-12-09 RX ADMIN — OXYCODONE HYDROCHLORIDE 15 MG: 5 TABLET ORAL at 21:04

## 2017-12-09 RX ADMIN — GABAPENTIN 300 MG: 300 CAPSULE ORAL at 12:23

## 2017-12-09 RX ADMIN — GABAPENTIN 600 MG: 600 TABLET, FILM COATED ORAL at 18:14

## 2017-12-09 RX ADMIN — INSULIN GLARGINE 47 UNITS: 100 INJECTION, SOLUTION SUBCUTANEOUS at 21:05

## 2017-12-09 RX ADMIN — ASPIRIN 81 MG CHEWABLE TABLET 81 MG: 81 TABLET CHEWABLE at 09:37

## 2017-12-09 RX ADMIN — INSULIN LISPRO 5 UNITS: 100 INJECTION, SOLUTION INTRAVENOUS; SUBCUTANEOUS at 21:08

## 2017-12-09 RX ADMIN — WARFARIN SODIUM 5 MG: 5 TABLET ORAL at 18:14

## 2017-12-09 RX ADMIN — LEVOTHYROXINE SODIUM 75 MCG: 50 TABLET ORAL at 05:42

## 2017-12-09 RX ADMIN — GUAIFENESIN 1200 MG: 600 TABLET, EXTENDED RELEASE ORAL at 21:02

## 2017-12-09 RX ADMIN — OXYCODONE HYDROCHLORIDE 15 MG: 5 TABLET ORAL at 00:55

## 2017-12-09 RX ADMIN — TIOTROPIUM BROMIDE 18 MCG: 18 CAPSULE ORAL; RESPIRATORY (INHALATION) at 09:39

## 2017-12-09 RX ADMIN — GUAIFENESIN 1200 MG: 600 TABLET, EXTENDED RELEASE ORAL at 09:37

## 2017-12-09 RX ADMIN — LACTULOSE 20 G: 20 SOLUTION ORAL at 09:37

## 2017-12-09 RX ADMIN — ATORVASTATIN CALCIUM 40 MG: 40 TABLET, FILM COATED ORAL at 21:03

## 2017-12-09 RX ADMIN — GABAPENTIN 300 MG: 300 CAPSULE ORAL at 09:38

## 2017-12-09 RX ADMIN — OXYCODONE HYDROCHLORIDE 15 MG: 5 TABLET ORAL at 06:09

## 2017-12-09 RX ADMIN — TAMSULOSIN HYDROCHLORIDE 0.4 MG: 0.4 CAPSULE ORAL at 09:38

## 2017-12-09 RX ADMIN — TRAMADOL HYDROCHLORIDE 50 MG: 50 TABLET, COATED ORAL at 02:31

## 2017-12-09 RX ADMIN — ALLOPURINOL 100 MG: 100 TABLET ORAL at 09:38

## 2017-12-09 RX ADMIN — BUMETANIDE 1 MG: 1 TABLET ORAL at 18:13

## 2017-12-09 RX ADMIN — TRAMADOL HYDROCHLORIDE 50 MG: 50 TABLET, COATED ORAL at 09:45

## 2017-12-09 RX ADMIN — ENOXAPARIN SODIUM 80 MG: 80 INJECTION SUBCUTANEOUS at 09:37

## 2017-12-09 RX ADMIN — FLUOXETINE HYDROCHLORIDE 20 MG: 20 CAPSULE ORAL at 21:03

## 2017-12-09 RX ADMIN — PANTOPRAZOLE SODIUM 40 MG: 40 TABLET, DELAYED RELEASE ORAL at 05:42

## 2017-12-09 ASSESSMENT — PAIN DESCRIPTION - DESCRIPTORS
DESCRIPTORS: ACHING;BURNING;CONSTANT;THROBBING
DESCRIPTORS: BURNING;ACHING;CONSTANT

## 2017-12-09 ASSESSMENT — PAIN SCALES - GENERAL
PAINLEVEL_OUTOF10: 0
PAINLEVEL_OUTOF10: 6
PAINLEVEL_OUTOF10: 9
PAINLEVEL_OUTOF10: 10
PAINLEVEL_OUTOF10: 8
PAINLEVEL_OUTOF10: 7
PAINLEVEL_OUTOF10: 8
PAINLEVEL_OUTOF10: 7
PAINLEVEL_OUTOF10: 0
PAINLEVEL_OUTOF10: 6
PAINLEVEL_OUTOF10: 6

## 2017-12-09 ASSESSMENT — PAIN DESCRIPTION - FREQUENCY
FREQUENCY: CONTINUOUS
FREQUENCY: CONTINUOUS

## 2017-12-09 ASSESSMENT — PAIN DESCRIPTION - PAIN TYPE
TYPE: PHANTOM PAIN;ACUTE PAIN
TYPE: PHANTOM PAIN

## 2017-12-09 ASSESSMENT — PAIN DESCRIPTION - ORIENTATION
ORIENTATION: LEFT;LOWER
ORIENTATION: RIGHT;LOWER

## 2017-12-09 ASSESSMENT — PAIN DESCRIPTION - LOCATION
LOCATION: LEG
LOCATION: LEG;HIP

## 2017-12-09 ASSESSMENT — PAIN DESCRIPTION - PROGRESSION
CLINICAL_PROGRESSION: NOT CHANGED
CLINICAL_PROGRESSION: NOT CHANGED

## 2017-12-09 NOTE — PROGRESS NOTES
Patient:   Melissa Marcus  MR#:    [id number]   Room:    [facility]/826-01   YOB: 1950  Date of Progress Note: 12/9/2017  Time of Note                           9:29 AM  Consulting Physician:   Onur La M.D. Attending Physician:  Onur La MD     CHIEF COMPLAINT:  Generalized weakness with right lower extremity pain status post right BKA         Subjective: This 69 y.o. male  with DM type II, who has had a wound on his right leg that started 4 months ago. It had been non healing. On 11/27/17 he had increased swelling,redness and odor around the wound. He contacted wound care center and was made a direct admit to Dr. Phoebe Weldon. He was found to have cellulitus of a right lower extremity open wound with achilles tendon visualized. He was placed on IV Vancomycin and meropenem. He was taken to OR on 11/28/17 by Dr. Phoebe Weldon for evaluation of his wound. He was found to have a massive infection with necrotic muscle and tendon lower leg. The decison was made for an open below the knee amputation. The wound was left open with planned staged revision in 48-72 hours. He tolerated the procedure well. He returned to surgery on 12/1/17 for revsion on Rt BKA. He tolerated the procedure well. He has had post op anemia and has required transfusions. Will need continued monitoring of his H&H. He had complaints of diarrhea on 12/3/17, C-Diff was negative. CT of abdomen done showed possible colitis. He was started on IV Flagy and diarrhea improved. He has c/o of some dyspnea, repeat chest x-ray done 12/5/17 showed worsening airspace opacities in the lower lobes could be due to pulmonary edema or pneumonia.   Did not sleep well last night  REVIEW OF SYSTEMS:  Constitutional: No fevers No chills  Neck:No stiffness  Respiratory: No shortness of breath  Cardiovascular: No chest pain No palpitations  Gastrointestinal: No abdominal pain    Genitourinary: No Dysuria  Neurological: No headache, no confusion      PHYSICAL EXAM:  /60   Pulse 62   Temp 98 °F (36.7 °C) (Temporal)   Resp 18   Ht 5' 11\" (1.803 m)   SpO2 95%   BMI 27.12 kg/m²     Constitutional: he appears well-developed and well-nourished. Eyes  conjunctiva normal.  Pupils react to light  Ear, nose, throat -hearing intact to finger rub No scars, masses, or lesions over external nose or ears, no atrophy of tongue  Neck-symmetric, no masses noted, no jugular vein distension  Respiration- chest wall appears symmetric, good expansion,   normal effort without use of accessory muscles  Musculoskeletal  no significant wasting of muscles noted, no bony deformities, gait no gross ataxia  Extremities-no clubbing, cyanosis or edema  Skin  warm, dry, and intact. No rash, erythema, or pallor. Psychiatric  mood, affect, and behavior appear normal.      Neurology  NEUROLOGICAL EXAM:      Mental status   Awake, alert, fluent oriented x 3 appropriate affect  Attention and concentration appear appropriate  Speech normal without dysarthria  No clear issues with language       Cranial Nerves   CN II- Visual fields grossly unremarkable  CN III, IV,VI-EOMI, No nystagmus, conjugate eye movements, no ptosis  CN VII-no facial assymetry  CN VIII-Hearing intact      Motor function  Antigravity x 4  With right below knee amputation        Cerebellar F-N intact     Tremor None present     Gait                  Not tested           Nursing/pcp notes, imaging,labs and vitals reviewed.      PT,OT and/or speech notes reviewed    Lab Results   Component Value Date    WBC 7.0 12/07/2017    HGB 7.6 (L) 12/07/2017    HCT 24.5 (L) 12/07/2017    MCV 89.1 12/07/2017     12/07/2017     Lab Results   Component Value Date     12/07/2017    K 4.0 12/07/2017     12/07/2017    CO2 28 12/07/2017    BUN 14 12/07/2017    CREATININE 0.9 12/07/2017    GLUCOSE 109 12/07/2017    CALCIUM 8.1 (L) 12/07/2017    PROT 4.6 (L) 12/04/2017    LABALBU 2.5 (L) 12/04/2017    BILITOT 0.3 12/04/2017

## 2017-12-10 LAB
GLUCOSE BLD-MCNC: 205 MG/DL (ref 70–99)
GLUCOSE BLD-MCNC: 238 MG/DL (ref 70–99)
GLUCOSE BLD-MCNC: 266 MG/DL (ref 70–99)
GLUCOSE BLD-MCNC: 90 MG/DL (ref 70–99)
HCT VFR BLD CALC: 23.8 % (ref 42–52)
HEMOGLOBIN: 7.2 G/DL (ref 14–18)
INR BLD: 2.01 (ref 0.88–1.18)
MCH RBC QN AUTO: 27.6 PG (ref 27–31)
MCHC RBC AUTO-ENTMCNC: 30.3 G/DL (ref 33–37)
MCV RBC AUTO: 91.2 FL (ref 80–94)
PDW BLD-RTO: 18.5 % (ref 11.5–14.5)
PERFORMED ON: ABNORMAL
PERFORMED ON: NORMAL
PLATELET # BLD: 212 K/UL (ref 130–400)
PMV BLD AUTO: 9.8 FL (ref 9.4–12.4)
PROTHROMBIN TIME: 22.9 SEC (ref 12–14.6)
RBC # BLD: 2.61 M/UL (ref 4.7–6.1)
WBC # BLD: 10.4 K/UL (ref 4.8–10.8)

## 2017-12-10 PROCEDURE — 6370000000 HC RX 637 (ALT 250 FOR IP): Performed by: FAMILY MEDICINE

## 2017-12-10 PROCEDURE — 36415 COLL VENOUS BLD VENIPUNCTURE: CPT

## 2017-12-10 PROCEDURE — 82948 REAGENT STRIP/BLOOD GLUCOSE: CPT

## 2017-12-10 PROCEDURE — 85027 COMPLETE CBC AUTOMATED: CPT

## 2017-12-10 PROCEDURE — 85610 PROTHROMBIN TIME: CPT

## 2017-12-10 PROCEDURE — 6370000000 HC RX 637 (ALT 250 FOR IP): Performed by: PSYCHIATRY & NEUROLOGY

## 2017-12-10 PROCEDURE — 99232 SBSQ HOSP IP/OBS MODERATE 35: CPT | Performed by: PSYCHIATRY & NEUROLOGY

## 2017-12-10 PROCEDURE — 2700000000 HC OXYGEN THERAPY PER DAY

## 2017-12-10 PROCEDURE — 1180000000 HC REHAB R&B

## 2017-12-10 RX ORDER — GABAPENTIN 400 MG/1
400 CAPSULE ORAL EVERY EVENING
Status: DISCONTINUED | OUTPATIENT
Start: 2017-12-10 | End: 2017-12-12

## 2017-12-10 RX ORDER — BUMETANIDE 1 MG/1
2 TABLET ORAL DAILY
Status: DISCONTINUED | OUTPATIENT
Start: 2017-12-11 | End: 2017-12-18 | Stop reason: HOSPADM

## 2017-12-10 RX ADMIN — WARFARIN SODIUM 7.5 MG: 2.5 TABLET ORAL at 17:52

## 2017-12-10 RX ADMIN — INSULIN LISPRO 9 UNITS: 100 INJECTION, SOLUTION INTRAVENOUS; SUBCUTANEOUS at 17:53

## 2017-12-10 RX ADMIN — TAMSULOSIN HYDROCHLORIDE 0.4 MG: 0.4 CAPSULE ORAL at 08:45

## 2017-12-10 RX ADMIN — GUAIFENESIN 1200 MG: 600 TABLET, EXTENDED RELEASE ORAL at 08:44

## 2017-12-10 RX ADMIN — GUAIFENESIN 1200 MG: 600 TABLET, EXTENDED RELEASE ORAL at 20:31

## 2017-12-10 RX ADMIN — OXYCODONE HYDROCHLORIDE 15 MG: 5 TABLET ORAL at 20:32

## 2017-12-10 RX ADMIN — METOPROLOL SUCCINATE 25 MG: 50 TABLET, FILM COATED, EXTENDED RELEASE ORAL at 08:44

## 2017-12-10 RX ADMIN — ASPIRIN 81 MG CHEWABLE TABLET 81 MG: 81 TABLET CHEWABLE at 08:44

## 2017-12-10 RX ADMIN — TIOTROPIUM BROMIDE 18 MCG: 18 CAPSULE ORAL; RESPIRATORY (INHALATION) at 08:43

## 2017-12-10 RX ADMIN — OXYCODONE HYDROCHLORIDE 15 MG: 5 TABLET ORAL at 02:20

## 2017-12-10 RX ADMIN — TRAMADOL HYDROCHLORIDE 50 MG: 50 TABLET, COATED ORAL at 20:42

## 2017-12-10 RX ADMIN — ATORVASTATIN CALCIUM 40 MG: 40 TABLET, FILM COATED ORAL at 20:33

## 2017-12-10 RX ADMIN — INSULIN LISPRO 6 UNITS: 100 INJECTION, SOLUTION INTRAVENOUS; SUBCUTANEOUS at 12:29

## 2017-12-10 RX ADMIN — FLUOXETINE HYDROCHLORIDE 20 MG: 20 CAPSULE ORAL at 20:33

## 2017-12-10 RX ADMIN — ALLOPURINOL 100 MG: 100 TABLET ORAL at 20:33

## 2017-12-10 RX ADMIN — BUMETANIDE 1 MG: 1 TABLET ORAL at 08:44

## 2017-12-10 RX ADMIN — LATANOPROST 1 DROP: 50 SOLUTION OPHTHALMIC at 20:39

## 2017-12-10 RX ADMIN — TRAZODONE HYDROCHLORIDE 100 MG: 50 TABLET, FILM COATED ORAL at 20:33

## 2017-12-10 RX ADMIN — OXYCODONE HYDROCHLORIDE 15 MG: 5 TABLET ORAL at 12:07

## 2017-12-10 RX ADMIN — INSULIN GLARGINE 47 UNITS: 100 INJECTION, SOLUTION SUBCUTANEOUS at 20:33

## 2017-12-10 RX ADMIN — PANTOPRAZOLE SODIUM 40 MG: 40 TABLET, DELAYED RELEASE ORAL at 05:47

## 2017-12-10 RX ADMIN — LEVOTHYROXINE SODIUM 75 MCG: 50 TABLET ORAL at 05:47

## 2017-12-10 RX ADMIN — GABAPENTIN 300 MG: 300 CAPSULE ORAL at 12:13

## 2017-12-10 RX ADMIN — GABAPENTIN 300 MG: 300 CAPSULE ORAL at 08:45

## 2017-12-10 RX ADMIN — GABAPENTIN 400 MG: 400 CAPSULE ORAL at 17:52

## 2017-12-10 RX ADMIN — ALLOPURINOL 100 MG: 100 TABLET ORAL at 08:44

## 2017-12-10 ASSESSMENT — PAIN SCALES - GENERAL
PAINLEVEL_OUTOF10: 8
PAINLEVEL_OUTOF10: 2
PAINLEVEL_OUTOF10: 0
PAINLEVEL_OUTOF10: 4

## 2017-12-10 NOTE — PROGRESS NOTES
Dr. Kylie Wagner notified that x-ray of right hip was negative. Very little change in H &H from Thursday to today. Patient also bleeding slightly from Lovenox puncture sites and skin tears to arms. Order received to discontinued Lovenox.   Okay to consult Hospitalist in AM.

## 2017-12-10 NOTE — CONSULTS
mitral valve annuloplasty repair w/30 mm Physio and tricuspid ring annuloplasty repair w/34 mm MC3 ring    CARDIAC CATHETERIZATION  12/11/14  [facility]    EF60%    CARDIAC CATHETERIZATION  12/16/14  [facility]    right heart cath EF 60%    CORONARY ANGIOPLASTY WITH STENT PLACEMENT  12/18/14  [facility]    to RCA    LEG AMPUTATION BELOW KNEE Right 11/28/2017     RIGHT OPEN BELOW KNEE AMPUTATION performed by Kathryn Montgomery MD at [facility] OR       Allergies:  Prazosin    Medications Current:   Current Facility-Administered Medications   Medication Dose Route Frequency Provider Last Rate Last Dose    gabapentin (NEURONTIN) capsule 400 mg  400 mg Oral QPM Emile Mcburney, MD        warfarin (COUMADIN) tablet 7.5 mg  7.5 mg Oral Daily Emile Mcburney, MD        gabapentin (NEURONTIN) capsule 300 mg  300 mg Oral 2 times per day Emile Mcburney, MD   300 mg at 12/10/17 1213    bumetanide (BUMEX) tablet 1 mg  1 mg Oral Daily Emile Mcburney, MD   1 mg at 12/10/17 0844    traMADol (ULTRAM) tablet 50 mg  50 mg Oral Q6H PRN Emile Mcburney, MD   50 mg at 12/09/17 0945    oxyCODONE (ROXICODONE) immediate release tablet 15 mg  15 mg Oral Q4H PRN Emile Mcburney, MD   15 mg at 12/10/17 1207    acetaminophen (TYLENOL) tablet 650 mg  650 mg Oral Q4H PRN MD Danay Rolon [facility]) injection 4 mg  4 mg Intravenous Q6H PRN Alvaro Tee MD        glucagon (rDNA) injection 1 mg  1 mg Intramuscular PRN Alvaro Tee MD        glucose (GLUTOSE) 40 % oral gel 15 g  15 g Oral PRN Alvaro Tee MD        insulin lispro (HUMALOG) injection vial 0-18 Units  0-18 Units Subcutaneous TID  Alvaro Tee MD   6 Units at 12/10/17 1229    insulin lispro (HUMALOG) injection vial 0-9 Units  0-9 Units Subcutaneous Nightly Alvaro Tee MD   5 Units at 12/09/17 2108    magnesium hydroxide (MILK OF MAGNESIA) 400 MG/5ML suspension 30 mL  30 mL Oral Daily PRN MD Danay Rolonetron [facility]) injection 4 mg  4 mg Intravenous Q6H PRN Vick Loo MD        allopurinol (ZYLOPRIM) tablet 100 mg  100 mg Oral BID Vick Loo MD   100 mg at 12/10/17 0844    aspirin chewable tablet 81 mg  81 mg Oral Daily Vick Loo MD   81 mg at 12/10/17 0844    atorvastatin (LIPITOR) tablet 40 mg  40 mg Oral Nightly Vick Loo MD   40 mg at 12/09/17 2103    calcium carbonate (TUMS) chewable tablet 500 mg  500 mg Oral Q4H PRN Vick Loo MD        FLUoxetine (PROZAC) capsule 20 mg  20 mg Oral Nightly Vick Loo MD   20 mg at 12/09/17 2103    guaiFENesin [facility]) extended release tablet 1,200 mg  1,200 mg Oral BID Vick Loo MD   1,200 mg at 12/10/17 0844    insulin glargine (LANTUS) injection vial 47 Units  47 Units Subcutaneous Nightly Vick Loo MD   47 Units at 12/09/17 2105    lactulose (CHRONULAC) 10 GM/15ML solution 20 g  20 g Oral Daily Vick Loo MD   20 g at 12/09/17 0937    latanoprost (XALATAN) 0.005 % ophthalmic solution 1 drop  1 drop Both Eyes Nightly Vick Loo MD   1 drop at 12/09/17 2105    levothyroxine (SYNTHROID) tablet 75 mcg  75 mcg Oral Daily Vick Loo MD   75 mcg at 12/10/17 0547    metoprolol succinate (TOPROL XL) extended release tablet 25 mg  25 mg Oral Daily Vick Loo MD   25 mg at 12/10/17 0844    pantoprazole (PROTONIX) tablet 40 mg  40 mg Oral QAM AC Vick Loo MD   40 mg at 12/10/17 0547    tamsulosin (FLOMAX) capsule 0.4 mg  0.4 mg Oral Daily Vick Loo MD   0.4 mg at 12/10/17 0845    tiotropium [facility]) inhalation capsule 18 mcg  18 mcg Inhalation Daily Vick Loo MD   18 mcg at 12/10/17 2463    traZODone (DESYREL) tablet 100 mg  100 mg Oral Nightly Vick Loo MD   100 mg at 12/09/17 2103    [START ON 12/4/2018] warfarin (COUMADIN) daily dosing (placeholder)   Other RX Placeholder Vick Loo MD        acetaminophen (TYLENOL) tablet 650 mg  650 mg Oral Q4H PRN Sherif Hodge MD 650 mg at 12/08/17 0405    magnesium hydroxide (MILK OF MAGNESIA) 400 MG/5ML suspension 30 mL  30 mL Oral Daily PRN Leslie Quick MD        bisacodyl (DULCOLAX) suppository 10 mg  10 mg Rectal Daily PRN Leslie Quick MD           Social History:   reports that he has never smoked. He has never used smokeless tobacco. He reports that he does not drink alcohol or use drugs. Family History:      Problem Relation Age of Onset    Parkinsonism Mother     Heart Disease Father     COPD Father     COPD Sister     Heart Disease Brother        REVIEW OF SYSTEMS:  General: Denies any fever or chills. Denies any unexplained weight loss or gain. Denies any change in activity or endurance. HEENT: Denies any headaches or visual changes. Respiratory: Denies any cough or hoarseness. Cardiac: Denies any chest pain or pressure. Denies any palpitations. Denies any presyncope or syncope. Mild orthopnea. Denies any lower extremity edema. GI: Denies any abdominal pain. Denies any nausea or vomiting. Denies any change in bowel habits. Denies any recent history of GI tract blood loss. : Denies any hematuria, frequency, hesitancy, or dysuria. Musculoskeletal: Denies any pain or swelling in his joints. Bleeding at surgical stump site today. Neurological: Denies any paraesthesias. Denies any history of seizure or stroke symptoms. Psychological: Denies any problems with anxiety or depression. All other systems are negative except where stated above. PHYSICAL EXAM:  BP (!) 129/54   Pulse 91   Temp 98.1 °F (36.7 °C) (Temporal)   Resp 18   Ht 5' 11\" (1.803 m)   Wt 210 lb 9.6 oz (95.5 kg)   SpO2 94%   BMI 29.37 kg/m²   General appearance: Demonstrates a well-developed, well-nourished male who is alert and oriented in no acute distress. NECK: Supple. NO JVD. No carotids bruits auscultated. Chest: Clear to auscultation bilaterally without wheezes or rhonchi.   Cardiac: Normal heart tones with regular rate and rhythm, S1, S2 normal. No murmurs, gallops, or rubs auscultated. Abdomen: Soft, non-tender; non-distended normal bowel sounds no masses, no organomegaly. Extremities: No clubbing or cyanosis. Right BKA. LLE with 2+ edema. Skin: Skin color, texture, turgor normal. No rashes or lesions  Neurologic: Grossly intact. LABS AND DIAGNOSTICS:      CBC with Differential:   Lab Results   Component Value Date    WBC 10.4 12/10/2017    RBC 2.61 12/10/2017    HGB 7.2 12/10/2017    HCT 23.8 12/10/2017     12/10/2017    MCV 91.2 12/10/2017    LYMPHOPCT 9.7 12/07/2017     BMP:   Lab Results   Component Value Date     12/07/2017    K 4.0 12/07/2017     12/07/2017    CO2 28 12/07/2017    BUN 14 12/07/2017    CREATININE 0.9 12/07/2017    CALCIUM 8.1 12/07/2017    LABGLOM >60 12/07/2017    GLUCOSE 109 12/07/2017       ASSESSMENT:    1. S/P Right BKA - Followed by Vascular Surgery  2.         DM, Type 2  3. Essential HTN  4.         CKD, Stage 3  5. PMHx Mechanical Mitral Valve Replacement, Anticoagulated on Coumadin  6. Acquired Hypothyroidism  7. Mixed Hyperlipidemia  8. Panlobular Emphysema  9. Glaucoma     PLAN:    1. Recheck CXR in AM  2. BMP in AM (already ordered)  3. Fluid Restriction 1800 ml  4. Strict I&O  5.  Daily Weight  6. 2D Echo in [address]

## 2017-12-10 NOTE — PROGRESS NOTES
Dr. Bre Luna here to assess wound. After I removed dressing wound appeared to have stopped bleeding. Dr. Bre Luna instructed me to put Quik Clot on incision, fluffs, roll gauze, and secure with tape and ace wrap. Abisai tector also reapplied. Message left for Cristhian Crook RN to check on Monday.

## 2017-12-10 NOTE — PROGRESS NOTES
Dr. Bre Luna taking call for Dr. Cullen Preciado.  Dr. Bre Luna notified regarding patient bleeding bright red blood to Right BKA. Dr. Bre Luna instructed me to remove dressing and he would be up to check wound.

## 2017-12-10 NOTE — PROGRESS NOTES
Axel Adair M.D. Daily Progress Note    Pt Name: Darryl Saldaña [place] Record Number: [id number]  Date of Birth 1950   Today's Date: 12/10/2017    Chief Complaint:  No chief complaint on file. SUBJECTIVE:     Patient was seen and examined. Called for oozing through dressing right BKA stump  On lovenox and coumadin. Held lovenox today and INR 2.0 today. OBJECTIVE:     Patient Vitals for the past 24 hrs:   BP Temp Temp src Pulse Resp SpO2 Height Weight   12/10/17 0843 (!) 118/38 - - 76 - - - -   12/10/17 0305 (!) 115/48 98 °F (36.7 °C) Temporal 77 20 92 % - 210 lb 9.6 oz (95.5 kg)   12/09/17 1554 134/63 97.9 °F (36.6 °C) Temporal 89 18 95 % 5' 11\" (1.803 m) 209 lb 4.8 oz (94.9 kg)         Intake/Output Summary (Last 24 hours) at 12/10/17 1247  Last data filed at 12/10/17 0958   Gross per 24 hour   Intake             1200 ml   Output              350 ml   Net              850 ml       In: 960 [P.O.:960]  Out: 350 [Urine:350]    I/O last 3 completed shifts: In: 1080 [P.O.:1080]  Out: 0 [Urine:650]       Date 12/10/17 0000 - 12/10/17 2359   Shift 1854-8985 2001-1493 9441-4210 24 Hour Total   I  N  T  A  K  E   P.O.  (mL/kg/hr) 240  (0.3) 480  720    Shift Total  (mL/kg) 240  (2.5) 480  (5)  720  (7.5)   O  U  T  P  U  T   Urine  (mL/kg/hr) 350  (0.5)   350    Shift Total  (mL/kg) 350  (3.7)   350  (3.7)   Weight (kg) 95.5 95.5 95.5 95.5       Wt Readings from Last 3 Encounters:   12/10/17 210 lb 9.6 oz (95.5 kg)   12/06/17 194 lb 7.1 oz (88.2 kg)   11/22/17 184 lb (83.5 kg)        Body mass index is 29.37 kg/m². Diet: DIET CARB CONTROL; Carb Control: 4 carbs/meal (approximate 1800 kcals/day);  No Added Salt (3-4 GM)        MEDS:     Scheduled Meds:   gabapentin  400 mg Oral QPM    warfarin  7.5 mg Oral Daily    gabapentin  300 mg Oral 2 times per day    bumetanide  1 mg Oral Daily    insulin lispro  0-18 Units Subcutaneous TID WC    insulin lispro  0-9 Units Subcutaneous Nightly    allopurinol  100 mg Oral BID    aspirin  81 mg Oral Daily    atorvastatin  40 mg Oral Nightly    FLUoxetine  20 mg Oral Nightly    guaiFENesin  1,200 mg Oral BID    insulin glargine  47 Units Subcutaneous Nightly    lactulose  20 g Oral Daily    latanoprost  1 drop Both Eyes Nightly    levothyroxine  75 mcg Oral Daily    metoprolol succinate  25 mg Oral Daily    pantoprazole  40 mg Oral QAM AC    tamsulosin  0.4 mg Oral Daily    tiotropium  18 mcg Inhalation Daily    traZODone  100 mg Oral Nightly    [START ON 12/4/2018] warfarin (COUMADIN) daily dosing (placeholder)   Other RX Placeholder     Continuous Infusions:   PRN Meds:  traMADol 50 mg Q6H PRN   oxyCODONE 15 mg Q4H PRN   acetaminophen 650 mg Q4H PRN   ondansetron 4 mg Q6H PRN   glucagon (rDNA) 1 mg PRN   glucose 15 g PRN   magnesium hydroxide 30 mL Daily PRN   ondansetron 4 mg Q6H PRN   calcium carbonate 500 mg Q4H PRN   acetaminophen 650 mg Q4H PRN   magnesium hydroxide 30 mL Daily PRN   bisacodyl 10 mg Daily PRN         PHYSICAL EXAM:     CONSTITUTIONAL: Alert and oriented times 3, no acute distress and cooperative to examination. WOUND/INCISION:  C/D/I, some edema right BKA, but no oozing now from wound      LABS:       CBC: Recent Labs      12/09/17   1447  12/10/17   0409   WBC  11.0*  10.4   RBC  2.72*  2.61*   HGB  7.5*  7.2*   HCT  24.5*  23.8*   MCV  90.1  91.2   MCH  27.6  27.6   MCHC  30.6*  30.3*   RDW  18.4*  18.5*   PLT  234  212   MPV  10.0  9.8      Last 3 CMP:   No results for input(s): NA, K, CL, CO2, BUN, CREATININE, GLUCOSE, CALCIUM, PROT, LABALBU, BILITOT, ALKPHOS, AST, ALT in the last 72 hours. Troponin: No results for input(s): TROPONINI in the last 72 hours.   Calcium:   Lab Results   Component Value Date    CALCIUM 8.1 12/07/2017    CALCIUM 7.7 12/04/2017    CALCIUM 7.4 12/03/2017      Ionized Calcium: No results found for: IONCA   Lipids: No results for input(s): CHOL, HDL in the last 72

## 2017-12-11 ENCOUNTER — APPOINTMENT (OUTPATIENT)
Dept: GENERAL RADIOLOGY | Age: 67
DRG: 560 | End: 2017-12-11
Attending: PSYCHIATRY & NEUROLOGY
Payer: MEDICARE

## 2017-12-11 LAB
ABO/RH: NORMAL
ANION GAP SERPL CALCULATED.3IONS-SCNC: 9 MMOL/L (ref 7–19)
ANTIBODY SCREEN: NORMAL
BASOPHILS ABSOLUTE: 0 K/UL (ref 0–0.2)
BASOPHILS RELATIVE PERCENT: 0.4 % (ref 0–1)
BLOOD BANK DISPENSE STATUS: NORMAL
BLOOD BANK DISPENSE STATUS: NORMAL
BLOOD BANK PRODUCT CODE: NORMAL
BLOOD BANK PRODUCT CODE: NORMAL
BPU ID: NORMAL
BPU ID: NORMAL
BUN BLDV-MCNC: 15 MG/DL (ref 8–23)
CALCIUM SERPL-MCNC: 7.8 MG/DL (ref 8.8–10.2)
CHLORIDE BLD-SCNC: 97 MMOL/L (ref 98–111)
CO2: 28 MMOL/L (ref 22–29)
CREAT SERPL-MCNC: 0.9 MG/DL (ref 0.5–1.2)
DESCRIPTION BLOOD BANK: NORMAL
DESCRIPTION BLOOD BANK: NORMAL
EOSINOPHILS ABSOLUTE: 0.1 K/UL (ref 0–0.6)
EOSINOPHILS RELATIVE PERCENT: 0.6 % (ref 0–5)
GFR NON-AFRICAN AMERICAN: >60
GLUCOSE BLD-MCNC: 165 MG/DL (ref 74–109)
GLUCOSE BLD-MCNC: 168 MG/DL (ref 70–99)
GLUCOSE BLD-MCNC: 187 MG/DL (ref 70–99)
GLUCOSE BLD-MCNC: 197 MG/DL (ref 70–99)
GLUCOSE BLD-MCNC: 220 MG/DL (ref 70–99)
HCT VFR BLD CALC: 21.4 % (ref 42–52)
HEMOGLOBIN: 6.7 G/DL (ref 14–18)
INR BLD: 2.2 (ref 0.88–1.18)
LV EF: 51 %
LVEF MODALITY: NORMAL
LYMPHOCYTES ABSOLUTE: 0.6 K/UL (ref 1.1–4.5)
LYMPHOCYTES RELATIVE PERCENT: 5.4 % (ref 20–40)
MCH RBC QN AUTO: 27.7 PG (ref 27–31)
MCHC RBC AUTO-ENTMCNC: 31.3 G/DL (ref 33–37)
MCV RBC AUTO: 88.4 FL (ref 80–94)
MONOCYTES ABSOLUTE: 0.8 K/UL (ref 0–0.9)
MONOCYTES RELATIVE PERCENT: 7.8 % (ref 0–10)
NEUTROPHILS ABSOLUTE: 9.2 K/UL (ref 1.5–7.5)
NEUTROPHILS RELATIVE PERCENT: 85.2 % (ref 50–65)
PDW BLD-RTO: 18.4 % (ref 11.5–14.5)
PERFORMED ON: ABNORMAL
PLATELET # BLD: 193 K/UL (ref 130–400)
PMV BLD AUTO: 9.9 FL (ref 9.4–12.4)
POTASSIUM SERPL-SCNC: 4.1 MMOL/L (ref 3.5–5)
PROTHROMBIN TIME: 24.6 SEC (ref 12–14.6)
RBC # BLD: 2.42 M/UL (ref 4.7–6.1)
SODIUM BLD-SCNC: 134 MMOL/L (ref 136–145)
WBC # BLD: 10.8 K/UL (ref 4.8–10.8)

## 2017-12-11 PROCEDURE — 85610 PROTHROMBIN TIME: CPT

## 2017-12-11 PROCEDURE — C1751 CATH, INF, PER/CENT/MIDLINE: HCPCS

## 2017-12-11 PROCEDURE — 71020 XR CHEST STANDARD TWO VW: CPT

## 2017-12-11 PROCEDURE — 85025 COMPLETE CBC W/AUTO DIFF WBC: CPT

## 2017-12-11 PROCEDURE — 86901 BLOOD TYPING SEROLOGIC RH(D): CPT

## 2017-12-11 PROCEDURE — 80048 BASIC METABOLIC PNL TOTAL CA: CPT

## 2017-12-11 PROCEDURE — 1180000000 HC REHAB R&B

## 2017-12-11 PROCEDURE — 6370000000 HC RX 637 (ALT 250 FOR IP): Performed by: NURSE PRACTITIONER

## 2017-12-11 PROCEDURE — P9040 RBC LEUKOREDUCED IRRADIATED: HCPCS

## 2017-12-11 PROCEDURE — 97535 SELF CARE MNGMENT TRAINING: CPT

## 2017-12-11 PROCEDURE — 86850 RBC ANTIBODY SCREEN: CPT

## 2017-12-11 PROCEDURE — 97530 THERAPEUTIC ACTIVITIES: CPT

## 2017-12-11 PROCEDURE — 76937 US GUIDE VASCULAR ACCESS: CPT

## 2017-12-11 PROCEDURE — 97110 THERAPEUTIC EXERCISES: CPT

## 2017-12-11 PROCEDURE — 2580000003 HC RX 258: Performed by: PSYCHIATRY & NEUROLOGY

## 2017-12-11 PROCEDURE — 6370000000 HC RX 637 (ALT 250 FOR IP): Performed by: PSYCHIATRY & NEUROLOGY

## 2017-12-11 PROCEDURE — 6370000000 HC RX 637 (ALT 250 FOR IP): Performed by: FAMILY MEDICINE

## 2017-12-11 PROCEDURE — 02HV33Z INSERTION OF INFUSION DEVICE INTO SUPERIOR VENA CAVA, PERCUTANEOUS APPROACH: ICD-10-PCS | Performed by: PSYCHIATRY & NEUROLOGY

## 2017-12-11 PROCEDURE — 36569 INSJ PICC 5 YR+ W/O IMAGING: CPT

## 2017-12-11 PROCEDURE — P9016 RBC LEUKOCYTES REDUCED: HCPCS

## 2017-12-11 PROCEDURE — 93306 TTE W/DOPPLER COMPLETE: CPT

## 2017-12-11 PROCEDURE — 2700000000 HC OXYGEN THERAPY PER DAY

## 2017-12-11 PROCEDURE — 86900 BLOOD TYPING SEROLOGIC ABO: CPT

## 2017-12-11 PROCEDURE — 36415 COLL VENOUS BLD VENIPUNCTURE: CPT

## 2017-12-11 PROCEDURE — 82948 REAGENT STRIP/BLOOD GLUCOSE: CPT

## 2017-12-11 PROCEDURE — 36430 TRANSFUSION BLD/BLD COMPNT: CPT

## 2017-12-11 RX ORDER — SODIUM CHLORIDE 0.9 % (FLUSH) 0.9 %
10 SYRINGE (ML) INJECTION PRN
Status: DISCONTINUED | OUTPATIENT
Start: 2017-12-11 | End: 2017-12-18 | Stop reason: HOSPADM

## 2017-12-11 RX ORDER — LIDOCAINE HYDROCHLORIDE 10 MG/ML
5 INJECTION, SOLUTION INFILTRATION; PERINEURAL ONCE
Status: DISCONTINUED | OUTPATIENT
Start: 2017-12-11 | End: 2017-12-18 | Stop reason: HOSPADM

## 2017-12-11 RX ORDER — SODIUM CHLORIDE 0.9 % (FLUSH) 0.9 %
10 SYRINGE (ML) INJECTION EVERY 12 HOURS SCHEDULED
Status: DISCONTINUED | OUTPATIENT
Start: 2017-12-11 | End: 2017-12-18 | Stop reason: HOSPADM

## 2017-12-11 RX ORDER — SODIUM CHLORIDE 0.9 % (FLUSH) 0.9 %
10 SYRINGE (ML) INJECTION EVERY 12 HOURS SCHEDULED
Status: DISCONTINUED | OUTPATIENT
Start: 2017-12-11 | End: 2017-12-11 | Stop reason: SDUPTHER

## 2017-12-11 RX ORDER — LIDOCAINE HYDROCHLORIDE 10 MG/ML
5 INJECTION, SOLUTION INFILTRATION; PERINEURAL ONCE
Status: DISCONTINUED | OUTPATIENT
Start: 2017-12-11 | End: 2017-12-11 | Stop reason: SDUPTHER

## 2017-12-11 RX ORDER — 0.9 % SODIUM CHLORIDE 0.9 %
250 INTRAVENOUS SOLUTION INTRAVENOUS ONCE
Status: COMPLETED | OUTPATIENT
Start: 2017-12-11 | End: 2017-12-12

## 2017-12-11 RX ORDER — SODIUM CHLORIDE 0.9 % (FLUSH) 0.9 %
10 SYRINGE (ML) INJECTION PRN
Status: DISCONTINUED | OUTPATIENT
Start: 2017-12-11 | End: 2017-12-11 | Stop reason: SDUPTHER

## 2017-12-11 RX ADMIN — OXYCODONE HYDROCHLORIDE 15 MG: 5 TABLET ORAL at 00:23

## 2017-12-11 RX ADMIN — ALLOPURINOL 100 MG: 100 TABLET ORAL at 10:06

## 2017-12-11 RX ADMIN — GABAPENTIN 300 MG: 300 CAPSULE ORAL at 10:05

## 2017-12-11 RX ADMIN — INSULIN LISPRO 3 UNITS: 100 INJECTION, SOLUTION INTRAVENOUS; SUBCUTANEOUS at 17:32

## 2017-12-11 RX ADMIN — OXYCODONE HYDROCHLORIDE 15 MG: 5 TABLET ORAL at 04:35

## 2017-12-11 RX ADMIN — BUMETANIDE 2 MG: 1 TABLET ORAL at 10:07

## 2017-12-11 RX ADMIN — GUAIFENESIN 1200 MG: 600 TABLET, EXTENDED RELEASE ORAL at 21:35

## 2017-12-11 RX ADMIN — Medication 10 ML: at 16:10

## 2017-12-11 RX ADMIN — WARFARIN SODIUM 7.5 MG: 2.5 TABLET ORAL at 17:31

## 2017-12-11 RX ADMIN — PANTOPRAZOLE SODIUM 40 MG: 40 TABLET, DELAYED RELEASE ORAL at 05:17

## 2017-12-11 RX ADMIN — OXYCODONE HYDROCHLORIDE 15 MG: 5 TABLET ORAL at 17:39

## 2017-12-11 RX ADMIN — INSULIN LISPRO 3 UNITS: 100 INJECTION, SOLUTION INTRAVENOUS; SUBCUTANEOUS at 07:50

## 2017-12-11 RX ADMIN — TIOTROPIUM BROMIDE 18 MCG: 18 CAPSULE ORAL; RESPIRATORY (INHALATION) at 10:07

## 2017-12-11 RX ADMIN — FLUOXETINE HYDROCHLORIDE 20 MG: 20 CAPSULE ORAL at 21:34

## 2017-12-11 RX ADMIN — GABAPENTIN 400 MG: 400 CAPSULE ORAL at 17:32

## 2017-12-11 RX ADMIN — INSULIN LISPRO 6 UNITS: 100 INJECTION, SOLUTION INTRAVENOUS; SUBCUTANEOUS at 12:33

## 2017-12-11 RX ADMIN — METOPROLOL SUCCINATE 25 MG: 50 TABLET, FILM COATED, EXTENDED RELEASE ORAL at 10:05

## 2017-12-11 RX ADMIN — INSULIN GLARGINE 47 UNITS: 100 INJECTION, SOLUTION SUBCUTANEOUS at 21:40

## 2017-12-11 RX ADMIN — TAMSULOSIN HYDROCHLORIDE 0.4 MG: 0.4 CAPSULE ORAL at 10:05

## 2017-12-11 RX ADMIN — LATANOPROST 1 DROP: 50 SOLUTION OPHTHALMIC at 22:03

## 2017-12-11 RX ADMIN — GABAPENTIN 300 MG: 300 CAPSULE ORAL at 12:32

## 2017-12-11 RX ADMIN — GUAIFENESIN 1200 MG: 600 TABLET, EXTENDED RELEASE ORAL at 10:07

## 2017-12-11 RX ADMIN — ATORVASTATIN CALCIUM 40 MG: 40 TABLET, FILM COATED ORAL at 21:35

## 2017-12-11 RX ADMIN — LEVOTHYROXINE SODIUM 75 MCG: 50 TABLET ORAL at 05:17

## 2017-12-11 RX ADMIN — SODIUM CHLORIDE 250 ML: 9 INJECTION, SOLUTION INTRAVENOUS at 16:10

## 2017-12-11 RX ADMIN — ALLOPURINOL 100 MG: 100 TABLET ORAL at 21:35

## 2017-12-11 RX ADMIN — ASPIRIN 81 MG CHEWABLE TABLET 81 MG: 81 TABLET CHEWABLE at 10:07

## 2017-12-11 RX ADMIN — Medication 10 ML: at 22:03

## 2017-12-11 ASSESSMENT — PAIN DESCRIPTION - PAIN TYPE: TYPE: PHANTOM PAIN

## 2017-12-11 ASSESSMENT — PAIN DESCRIPTION - ORIENTATION: ORIENTATION: LEFT

## 2017-12-11 ASSESSMENT — PAIN SCALES - GENERAL
PAINLEVEL_OUTOF10: 8
PAINLEVEL_OUTOF10: 10
PAINLEVEL_OUTOF10: 8
PAINLEVEL_OUTOF10: 1
PAINLEVEL_OUTOF10: 7
PAINLEVEL_OUTOF10: 8

## 2017-12-11 ASSESSMENT — PAIN DESCRIPTION - LOCATION: LOCATION: HIP;LEG

## 2017-12-11 ASSESSMENT — PAIN DESCRIPTION - PROGRESSION: CLINICAL_PROGRESSION: GRADUALLY IMPROVING

## 2017-12-11 ASSESSMENT — PAIN DESCRIPTION - FREQUENCY: FREQUENCY: CONTINUOUS

## 2017-12-11 ASSESSMENT — PAIN DESCRIPTION - DESCRIPTORS: DESCRIPTORS: ACHING;CONSTANT;BURNING

## 2017-12-11 NOTE — PROGRESS NOTES
Power Injectable Midline trimmed to 15 cm and placed in the left basilic vessel using ultrasound. Catheter tip distal to axilla per measurements with excellent blood return. Line ready to use.

## 2017-12-11 NOTE — PATIENT CARE CONFERENCE
[facility] ACUTE INPATIENT REHABILITATION  TEAM CONFERENCE NOTE    Date: 2017  Patient Name: Keeley Denton        MRN: [id number]    : 1950  (69 y.o.)  Gender: male      Diagnosis: S/P RIGHT BELOW KNEE AMPUTATION      PHYSICAL THERAPY  STRENGTH  Strength RLE  Strength RLE: Exception  Comment: HIP 3/5 GROSSLY, KNEE NOT TESTED  Strength LLE  Strength LLE: Exception  Comment: 4/5 HIP, 4+/5 KNEE, 4/5 PLANTAR, 3+/5 DORSI  ROM  AROM RLE (degrees)  RLE AROM:  (KNEE NOT TESTED WFL HIP)  AROM LLE (degrees)  LLE AROM : WFL  BED MOBILITY  Rolling: Stand by assistance  Supine to Sit: Stand by assistance  Sit to Supine: Stand by assistance  TRANSFERS  Sit to Stand: Contact guard assistance  Bed to Chair: Contact guard assistance     WHEELCHAIR PROPULSION  Level of Assistance: Independent  Distance: 150'  Description/ Details: O2 at 2L  AMBULATION  Device: [facility]:  Moderate assistance, Minimal assistance  Distance: 5  Quality of Gait: DECREASED STRENGTH RLE WITH UNSTABLE STANCE FOLLOWING SWING PHASE LLE  STAIRS      N/T        FIM SCORES  Bed, Chair, Wheel Chair: 4 - Requires steadying assistance only <25% assist  and/or requires assist with one leg only  Walk: 1 - Total Assistance Walks/operates wheelchair < 50 feet OR requires two or more people OR patient performs < 25% of locomotion effort  Wheel Chair: 2 - Maximal Assistance Requires up to Maximal Assistance AND requires assistance of one person to walk/operate wheelchair between  feet  Stairs: 0 - Activity Does not Occur ( 0 only for the admission assessment)  GOALS:  Short term goals  Time Frame for Short term goals: 1 WEEK  Short term goal 1: TRANSFER FIM TO 5  Short term goal 2: AMB FIM TO 1  Short term goal 3: W/C FIM TO 5  Short term goal 5: MIN A HEP    Long term goals  Time Frame for Long term goals : 2 WEEKS  Long term goal 1: TRANSFER FIM TO 6  Long term goal 2: AMB FIM TO 5E  Long term goal 3: W/C FIM TO 6  Long term supervision/cues  Problem Solvin - Patient able to solve simple/routine tasks  Memory: 4 - Patient remembers 75-90%+ of the time    UE Functioning:  WFLs    Pain Assessment:  Pain Level: 7  Pain Location: Leg    STGs:  Short term goals  Time Frame for Short term goals: 1 week   Short term goal 1: MET  Short term goal 2: Supervision with toilet transfer. Short term goal 3: Supervision with toileting. Short term goal 4: Pt will complete one handed static standing task for 2 minutes with CGA. Short term goal 5: Pt will complete light meal prep at w/c level with supervision. LTGs:  Long term goals  Time Frame for Long term goals : 2 weeks   Long term goal 1: Modified independent with overall dressing. Long term goal 2: Modified independent with overall toileting. Long term goal 3: Modified independent with bathing hygiene. Long term goal 4: Independent with HEP. Long term goal 5: Pt verbalize DME needs. Assessment:  Decreased functional mobility ; Decreased ADL status; Decreased strength; Decreased cognition; Decreased balance; Decreased vision/visual deficit; Decreased high-level IADLs    NUTRITION  Current Wt: Weight: 205 lb 14.4 oz (93.4 kg) / Body mass index is 28.72 kg/m². Admission Wt: Admission Body Weight:  (none recorded)  Oral Diet Orders: Carb Control 4 Carbs/Meal, No Added Salt (3-4gm), Fluid Restriction   Oral Nutrition Supplement (ONS) Orders: None  Please see nutrition note for details. NURSING    FIMS:  Bladder  Level of Assistance: Bladder Level of Assistance: 3- Moderate Assistance (Subject equal 50% or more)  Frequency of Accidents: Bladder Frequency of Accidents: 5 - One accident    Bowel  Level of Assistance: Bowel Level of Assistance: 3- Moderate Assistance (Subject equal 50% or more)  Frequency of Accidents:  Bowel Frequency of Accidents: 6 - No accidents: uses device    Wounds/Incisions/Ulcers: Incision healing well     Cristino Scale Score: 16    Pain: pt does not

## 2017-12-12 LAB
GLUCOSE BLD-MCNC: 130 MG/DL (ref 70–99)
GLUCOSE BLD-MCNC: 145 MG/DL (ref 70–99)
GLUCOSE BLD-MCNC: 169 MG/DL (ref 70–99)
GLUCOSE BLD-MCNC: 197 MG/DL (ref 70–99)
HCT VFR BLD CALC: 26.4 % (ref 42–52)
HEMOGLOBIN: 8.1 G/DL (ref 14–18)
INR BLD: 2.52 (ref 0.88–1.18)
MCH RBC QN AUTO: 27.6 PG (ref 27–31)
MCHC RBC AUTO-ENTMCNC: 30.7 G/DL (ref 33–37)
MCV RBC AUTO: 90.1 FL (ref 80–94)
PDW BLD-RTO: 17.7 % (ref 11.5–14.5)
PERFORMED ON: ABNORMAL
PLATELET # BLD: 187 K/UL (ref 130–400)
PMV BLD AUTO: 9.3 FL (ref 9.4–12.4)
PROTHROMBIN TIME: 27.4 SEC (ref 12–14.6)
RBC # BLD: 2.93 M/UL (ref 4.7–6.1)
WBC # BLD: 8.2 K/UL (ref 4.8–10.8)

## 2017-12-12 PROCEDURE — 2580000003 HC RX 258: Performed by: PSYCHIATRY & NEUROLOGY

## 2017-12-12 PROCEDURE — 97110 THERAPEUTIC EXERCISES: CPT

## 2017-12-12 PROCEDURE — 6370000000 HC RX 637 (ALT 250 FOR IP): Performed by: SURGERY

## 2017-12-12 PROCEDURE — 2700000000 HC OXYGEN THERAPY PER DAY

## 2017-12-12 PROCEDURE — 6370000000 HC RX 637 (ALT 250 FOR IP): Performed by: FAMILY MEDICINE

## 2017-12-12 PROCEDURE — 6370000000 HC RX 637 (ALT 250 FOR IP): Performed by: NURSE PRACTITIONER

## 2017-12-12 PROCEDURE — 97530 THERAPEUTIC ACTIVITIES: CPT

## 2017-12-12 PROCEDURE — 1180000000 HC REHAB R&B

## 2017-12-12 PROCEDURE — 85610 PROTHROMBIN TIME: CPT

## 2017-12-12 PROCEDURE — 85027 COMPLETE CBC AUTOMATED: CPT

## 2017-12-12 PROCEDURE — 6370000000 HC RX 637 (ALT 250 FOR IP): Performed by: PSYCHIATRY & NEUROLOGY

## 2017-12-12 PROCEDURE — 97535 SELF CARE MNGMENT TRAINING: CPT

## 2017-12-12 PROCEDURE — 99232 SBSQ HOSP IP/OBS MODERATE 35: CPT | Performed by: PSYCHIATRY & NEUROLOGY

## 2017-12-12 PROCEDURE — 82948 REAGENT STRIP/BLOOD GLUCOSE: CPT

## 2017-12-12 RX ORDER — CEPHALEXIN 500 MG/1
500 CAPSULE ORAL EVERY 8 HOURS SCHEDULED
Status: DISCONTINUED | OUTPATIENT
Start: 2017-12-12 | End: 2017-12-18 | Stop reason: HOSPADM

## 2017-12-12 RX ORDER — GABAPENTIN 300 MG/1
600 CAPSULE ORAL EVERY EVENING
Status: DISCONTINUED | OUTPATIENT
Start: 2017-12-12 | End: 2017-12-18 | Stop reason: HOSPADM

## 2017-12-12 RX ADMIN — TAMSULOSIN HYDROCHLORIDE 0.4 MG: 0.4 CAPSULE ORAL at 07:38

## 2017-12-12 RX ADMIN — CEPHALEXIN 500 MG: 500 CAPSULE ORAL at 20:35

## 2017-12-12 RX ADMIN — ASPIRIN 81 MG CHEWABLE TABLET 81 MG: 81 TABLET CHEWABLE at 07:37

## 2017-12-12 RX ADMIN — ATORVASTATIN CALCIUM 40 MG: 40 TABLET, FILM COATED ORAL at 19:50

## 2017-12-12 RX ADMIN — TIOTROPIUM BROMIDE 18 MCG: 18 CAPSULE ORAL; RESPIRATORY (INHALATION) at 07:35

## 2017-12-12 RX ADMIN — INSULIN LISPRO 3 UNITS: 100 INJECTION, SOLUTION INTRAVENOUS; SUBCUTANEOUS at 18:03

## 2017-12-12 RX ADMIN — CEPHALEXIN 500 MG: 500 CAPSULE ORAL at 15:38

## 2017-12-12 RX ADMIN — TRAMADOL HYDROCHLORIDE 50 MG: 50 TABLET, COATED ORAL at 21:18

## 2017-12-12 RX ADMIN — OXYCODONE HYDROCHLORIDE 15 MG: 5 TABLET ORAL at 03:27

## 2017-12-12 RX ADMIN — GUAIFENESIN 1200 MG: 600 TABLET, EXTENDED RELEASE ORAL at 07:36

## 2017-12-12 RX ADMIN — WARFARIN SODIUM 7.5 MG: 2.5 TABLET ORAL at 18:02

## 2017-12-12 RX ADMIN — BUMETANIDE 2 MG: 1 TABLET ORAL at 07:36

## 2017-12-12 RX ADMIN — Medication 10 ML: at 07:42

## 2017-12-12 RX ADMIN — LATANOPROST 1 DROP: 50 SOLUTION OPHTHALMIC at 19:51

## 2017-12-12 RX ADMIN — OXYCODONE HYDROCHLORIDE 15 MG: 5 TABLET ORAL at 19:50

## 2017-12-12 RX ADMIN — GABAPENTIN 600 MG: 300 CAPSULE ORAL at 18:02

## 2017-12-12 RX ADMIN — PANTOPRAZOLE SODIUM 40 MG: 40 TABLET, DELAYED RELEASE ORAL at 05:16

## 2017-12-12 RX ADMIN — Medication 10 ML: at 20:40

## 2017-12-12 RX ADMIN — GABAPENTIN 300 MG: 300 CAPSULE ORAL at 07:37

## 2017-12-12 RX ADMIN — METOPROLOL SUCCINATE 25 MG: 50 TABLET, FILM COATED, EXTENDED RELEASE ORAL at 07:38

## 2017-12-12 RX ADMIN — GABAPENTIN 300 MG: 300 CAPSULE ORAL at 11:51

## 2017-12-12 RX ADMIN — TRAZODONE HYDROCHLORIDE 100 MG: 50 TABLET, FILM COATED ORAL at 19:50

## 2017-12-12 RX ADMIN — INSULIN LISPRO 3 UNITS: 100 INJECTION, SOLUTION INTRAVENOUS; SUBCUTANEOUS at 11:52

## 2017-12-12 RX ADMIN — LEVOTHYROXINE SODIUM 75 MCG: 50 TABLET ORAL at 05:16

## 2017-12-12 RX ADMIN — FLUOXETINE HYDROCHLORIDE 20 MG: 20 CAPSULE ORAL at 19:50

## 2017-12-12 RX ADMIN — ALLOPURINOL 100 MG: 100 TABLET ORAL at 19:49

## 2017-12-12 RX ADMIN — GUAIFENESIN 1200 MG: 600 TABLET, EXTENDED RELEASE ORAL at 19:49

## 2017-12-12 RX ADMIN — OXYCODONE HYDROCHLORIDE 15 MG: 5 TABLET ORAL at 11:51

## 2017-12-12 RX ADMIN — INSULIN GLARGINE 47 UNITS: 100 INJECTION, SOLUTION SUBCUTANEOUS at 19:51

## 2017-12-12 RX ADMIN — OXYCODONE HYDROCHLORIDE 15 MG: 5 TABLET ORAL at 07:38

## 2017-12-12 RX ADMIN — INSULIN LISPRO 2 UNITS: 100 INJECTION, SOLUTION INTRAVENOUS; SUBCUTANEOUS at 20:34

## 2017-12-12 RX ADMIN — ALLOPURINOL 100 MG: 100 TABLET ORAL at 07:38

## 2017-12-12 ASSESSMENT — PAIN SCALES - GENERAL
PAINLEVEL_OUTOF10: 4
PAINLEVEL_OUTOF10: 2
PAINLEVEL_OUTOF10: 8
PAINLEVEL_OUTOF10: 8
PAINLEVEL_OUTOF10: 6
PAINLEVEL_OUTOF10: 7
PAINLEVEL_OUTOF10: 8
PAINLEVEL_OUTOF10: 6

## 2017-12-12 ASSESSMENT — PAIN DESCRIPTION - DESCRIPTORS
DESCRIPTORS: ACHING
DESCRIPTORS: ACHING

## 2017-12-12 ASSESSMENT — PAIN DESCRIPTION - LOCATION
LOCATION: LEG
LOCATION: LEG

## 2017-12-12 ASSESSMENT — PAIN DESCRIPTION - PAIN TYPE
TYPE: PHANTOM PAIN
TYPE: SURGICAL PAIN

## 2017-12-12 ASSESSMENT — PAIN DESCRIPTION - PROGRESSION: CLINICAL_PROGRESSION: NOT CHANGED

## 2017-12-12 ASSESSMENT — PAIN DESCRIPTION - FREQUENCY
FREQUENCY: CONTINUOUS
FREQUENCY: CONTINUOUS

## 2017-12-12 ASSESSMENT — PAIN DESCRIPTION - ORIENTATION
ORIENTATION: RIGHT
ORIENTATION: RIGHT

## 2017-12-12 ASSESSMENT — PAIN DESCRIPTION - ONSET: ONSET: ON-GOING

## 2017-12-12 NOTE — PROGRESS NOTES
Nutrition Assessment    Type and Reason for Visit: Reassess    Malnutrition Assessment:  · Malnutrition Status: No malnutrition    Nutrition Diagnosis:   · Problem: Increased nutrient needs  · Etiology: Increased demand for energy/nutrients due to    Signs and symptoms: Presence of wounds    Nutrition Assessment:  · Subjective Assessment: Appetite is good. Nutrition Risk Level   Risk Level: Low    Nutrition Intervention  Food and/or Delivery: Continue current diet  Nutrition Education/Counseling/Coordination of Care:  Continued Inpatient Monitoring    Patient assessed for nutrition risk. Deemed to be at low risk at this time. Will continue to follow patient.       Electronically signed by Javier Guerrero MS, RD, LD on 12/12/17 at 3:53 PM    Contact Number: [phone]

## 2017-12-12 NOTE — PROGRESS NOTES
Patient:   Arcola Snellen  MR#:    [id number]   Room:    0826/826-01   YOB: 1950  Date of Progress Note: 12/12/2017  Time of Note                           7:09 AM  Consulting Physician:   Tyrone Mejia M.D. Attending Physician:  Tyrone Mejia MD     CHIEF COMPLAINT:  Generalized weakness with right lower extremity pain status post right BKA         Subjective: This 69 y.o. male  with DM type II, who has had a wound on his right leg that started 4 months ago. It had been non healing. On 11/27/17 he had increased swelling,redness and odor around the wound. He contacted wound care center and was made a direct admit to Dr. Silvina Ruggiero. He was found to have cellulitus of a right lower extremity open wound with achilles tendon visualized. He was placed on IV Vancomycin and meropenem. He was taken to OR on 11/28/17 by Dr. Silvina Ruggiero for evaluation of his wound. He was found to have a massive infection with necrotic muscle and tendon lower leg. The decison was made for an open below the knee amputation. The wound was left open with planned staged revision in 48-72 hours. He tolerated the procedure well. He returned to surgery on 12/1/17 for revsion on Rt BKA. He tolerated the procedure well. He has had post op anemia and has required transfusions. Will need continued monitoring of his H&H. He had complaints of diarrhea on 12/3/17, C-Diff was negative. CT of abdomen done showed possible colitis. He was started on IV Flagy and diarrhea improved. He has c/o of some dyspnea, repeat chest x-ray done 12/5/17 showed worsening airspace opacities in the lower lobes could be due to pulmonary edema or pneumonia. Did not sleep well.   Had transfusion yesterday  REVIEW OF SYSTEMS:  Constitutional: No fevers No chills  Neck:No stiffness  Respiratory: No shortness of breath  Cardiovascular: No chest pain No palpitations  Gastrointestinal: No abdominal pain    Genitourinary: No Dysuria  Neurological: No headache, no confusion      PHYSICAL EXAM:  /60   Pulse 76   Temp 98 °F (36.7 °C) (Temporal)   Resp 18   Ht 5' 11\" (1.803 m)   Wt 208 lb (94.3 kg)   SpO2 91%   BMI 29.01 kg/m²     Constitutional: he appears well-developed and well-nourished. Eyes  conjunctiva normal.  Pupils react to light  Ear, nose, throat -hearing intact to finger rub No scars, masses, or lesions over external nose or ears, no atrophy of tongue  Neck-symmetric, no masses noted, no jugular vein distension  Respiration- chest wall appears symmetric, good expansion,   normal effort without use of accessory muscles  Musculoskeletal  no significant wasting of muscles noted, no bony deformities, gait no gross ataxia  Extremities-no clubbing, cyanosis or edema  Skin  warm, dry, and intact. No rash, erythema, or pallor. Psychiatric  mood, affect, and behavior appear normal.      Neurology  NEUROLOGICAL EXAM:      Mental status   Awake, alert, fluent oriented x 3 appropriate affect  Attention and concentration appear appropriate  Speech normal without dysarthria  No clear issues with language       Cranial Nerves   CN II- Visual fields grossly unremarkable  CN III, IV,VI-EOMI, No nystagmus, conjugate eye movements, no ptosis  CN VII-no facial assymetry  CN VIII-Hearing intact      Motor function  Antigravity x 4  With right below knee amputation        Cerebellar F-N intact     Tremor None present     Gait                  Not tested           Nursing/pcp notes, imaging,labs and vitals reviewed.      PT,OT and/or speech notes reviewed    Lab Results   Component Value Date    WBC 10.8 12/11/2017    HGB 6.7 (L) 12/11/2017    HCT 21.4 (L) 12/11/2017    MCV 88.4 12/11/2017     12/11/2017     Lab Results   Component Value Date     (L) 12/11/2017    K 4.1 12/11/2017    CL 97 (L) 12/11/2017    CO2 28 12/11/2017    BUN 15 12/11/2017    CREATININE 0.9 12/11/2017    GLUCOSE 165 (H) 12/11/2017    CALCIUM 7.8 (L) 12/11/2017    PROT 4.6 (L)

## 2017-12-12 NOTE — PROGRESS NOTES
Natalie Baker has been placed on a medical hold by their doctor due to recieiving packed RBC due to decreased hemoglobin . Therapy will resume when deemed medically appropriate.       Electronically signed by Radha Whitney PT on 12/12/2017 at 8:12 AM

## 2017-12-13 LAB
GLUCOSE BLD-MCNC: 145 MG/DL (ref 70–99)
GLUCOSE BLD-MCNC: 185 MG/DL (ref 70–99)
GLUCOSE BLD-MCNC: 223 MG/DL (ref 70–99)
GLUCOSE BLD-MCNC: 228 MG/DL (ref 70–99)
INR BLD: 2.68 (ref 0.88–1.18)
PERFORMED ON: ABNORMAL
PROTHROMBIN TIME: 28.8 SEC (ref 12–14.6)

## 2017-12-13 PROCEDURE — 6370000000 HC RX 637 (ALT 250 FOR IP): Performed by: SURGERY

## 2017-12-13 PROCEDURE — 6370000000 HC RX 637 (ALT 250 FOR IP): Performed by: PSYCHIATRY & NEUROLOGY

## 2017-12-13 PROCEDURE — 97110 THERAPEUTIC EXERCISES: CPT

## 2017-12-13 PROCEDURE — 82948 REAGENT STRIP/BLOOD GLUCOSE: CPT

## 2017-12-13 PROCEDURE — 97530 THERAPEUTIC ACTIVITIES: CPT

## 2017-12-13 PROCEDURE — 6370000000 HC RX 637 (ALT 250 FOR IP): Performed by: FAMILY MEDICINE

## 2017-12-13 PROCEDURE — 6370000000 HC RX 637 (ALT 250 FOR IP): Performed by: NURSE PRACTITIONER

## 2017-12-13 PROCEDURE — 2700000000 HC OXYGEN THERAPY PER DAY

## 2017-12-13 PROCEDURE — 99233 SBSQ HOSP IP/OBS HIGH 50: CPT | Performed by: PSYCHIATRY & NEUROLOGY

## 2017-12-13 PROCEDURE — 85610 PROTHROMBIN TIME: CPT

## 2017-12-13 PROCEDURE — 2580000003 HC RX 258: Performed by: PSYCHIATRY & NEUROLOGY

## 2017-12-13 PROCEDURE — 97535 SELF CARE MNGMENT TRAINING: CPT

## 2017-12-13 PROCEDURE — 1180000000 HC REHAB R&B

## 2017-12-13 RX ORDER — OXYCODONE AND ACETAMINOPHEN 10; 325 MG/1; MG/1
1 TABLET ORAL EVERY 4 HOURS PRN
Status: DISCONTINUED | OUTPATIENT
Start: 2017-12-13 | End: 2017-12-18 | Stop reason: HOSPADM

## 2017-12-13 RX ADMIN — GUAIFENESIN 1200 MG: 600 TABLET, EXTENDED RELEASE ORAL at 08:03

## 2017-12-13 RX ADMIN — CEPHALEXIN 500 MG: 500 CAPSULE ORAL at 05:01

## 2017-12-13 RX ADMIN — LEVOTHYROXINE SODIUM 75 MCG: 50 TABLET ORAL at 05:05

## 2017-12-13 RX ADMIN — WARFARIN SODIUM 7.5 MG: 2.5 TABLET ORAL at 17:50

## 2017-12-13 RX ADMIN — ALLOPURINOL 100 MG: 100 TABLET ORAL at 21:14

## 2017-12-13 RX ADMIN — INSULIN GLARGINE 47 UNITS: 100 INJECTION, SOLUTION SUBCUTANEOUS at 21:13

## 2017-12-13 RX ADMIN — CEPHALEXIN 500 MG: 500 CAPSULE ORAL at 14:01

## 2017-12-13 RX ADMIN — Medication 10 ML: at 12:27

## 2017-12-13 RX ADMIN — BUMETANIDE 2 MG: 1 TABLET ORAL at 08:03

## 2017-12-13 RX ADMIN — OXYCODONE HYDROCHLORIDE 15 MG: 5 TABLET ORAL at 00:45

## 2017-12-13 RX ADMIN — TIOTROPIUM BROMIDE 18 MCG: 18 CAPSULE ORAL; RESPIRATORY (INHALATION) at 08:04

## 2017-12-13 RX ADMIN — ALLOPURINOL 100 MG: 100 TABLET ORAL at 08:03

## 2017-12-13 RX ADMIN — TRAZODONE HYDROCHLORIDE 100 MG: 50 TABLET, FILM COATED ORAL at 21:14

## 2017-12-13 RX ADMIN — LATANOPROST 1 DROP: 50 SOLUTION OPHTHALMIC at 21:14

## 2017-12-13 RX ADMIN — GABAPENTIN 300 MG: 300 CAPSULE ORAL at 12:32

## 2017-12-13 RX ADMIN — CEPHALEXIN 500 MG: 500 CAPSULE ORAL at 22:14

## 2017-12-13 RX ADMIN — GABAPENTIN 300 MG: 300 CAPSULE ORAL at 08:04

## 2017-12-13 RX ADMIN — FLUOXETINE HYDROCHLORIDE 20 MG: 20 CAPSULE ORAL at 21:14

## 2017-12-13 RX ADMIN — PANTOPRAZOLE SODIUM 40 MG: 40 TABLET, DELAYED RELEASE ORAL at 05:05

## 2017-12-13 RX ADMIN — OXYCODONE HYDROCHLORIDE AND ACETAMINOPHEN 1 TABLET: 10; 325 TABLET ORAL at 09:59

## 2017-12-13 RX ADMIN — METOPROLOL SUCCINATE 25 MG: 50 TABLET, FILM COATED, EXTENDED RELEASE ORAL at 08:05

## 2017-12-13 RX ADMIN — GABAPENTIN 600 MG: 300 CAPSULE ORAL at 17:49

## 2017-12-13 RX ADMIN — GUAIFENESIN 1200 MG: 600 TABLET, EXTENDED RELEASE ORAL at 21:14

## 2017-12-13 RX ADMIN — INSULIN LISPRO 3 UNITS: 100 INJECTION, SOLUTION INTRAVENOUS; SUBCUTANEOUS at 08:06

## 2017-12-13 RX ADMIN — ATORVASTATIN CALCIUM 40 MG: 40 TABLET, FILM COATED ORAL at 21:14

## 2017-12-13 RX ADMIN — OXYCODONE HYDROCHLORIDE 15 MG: 5 TABLET ORAL at 05:01

## 2017-12-13 RX ADMIN — TRAMADOL HYDROCHLORIDE 50 MG: 50 TABLET, COATED ORAL at 02:46

## 2017-12-13 RX ADMIN — INSULIN LISPRO 6 UNITS: 100 INJECTION, SOLUTION INTRAVENOUS; SUBCUTANEOUS at 12:27

## 2017-12-13 RX ADMIN — OXYCODONE HYDROCHLORIDE AND ACETAMINOPHEN 1 TABLET: 10; 325 TABLET ORAL at 22:14

## 2017-12-13 RX ADMIN — ASPIRIN 81 MG CHEWABLE TABLET 81 MG: 81 TABLET CHEWABLE at 08:03

## 2017-12-13 RX ADMIN — TAMSULOSIN HYDROCHLORIDE 0.4 MG: 0.4 CAPSULE ORAL at 08:03

## 2017-12-13 RX ADMIN — OXYCODONE HYDROCHLORIDE AND ACETAMINOPHEN 1 TABLET: 10; 325 TABLET ORAL at 18:12

## 2017-12-13 ASSESSMENT — PAIN DESCRIPTION - LOCATION
LOCATION: KNEE
LOCATION: FOOT
LOCATION: LEG
LOCATION: LEG

## 2017-12-13 ASSESSMENT — PAIN SCALES - GENERAL
PAINLEVEL_OUTOF10: 8
PAINLEVEL_OUTOF10: 6
PAINLEVEL_OUTOF10: 7
PAINLEVEL_OUTOF10: 7
PAINLEVEL_OUTOF10: 0
PAINLEVEL_OUTOF10: 7
PAINLEVEL_OUTOF10: 7
PAINLEVEL_OUTOF10: 3
PAINLEVEL_OUTOF10: 6
PAINLEVEL_OUTOF10: 8
PAINLEVEL_OUTOF10: 4
PAINLEVEL_OUTOF10: 10

## 2017-12-13 ASSESSMENT — PAIN DESCRIPTION - ORIENTATION
ORIENTATION: RIGHT

## 2017-12-13 ASSESSMENT — PAIN DESCRIPTION - PROGRESSION
CLINICAL_PROGRESSION: NOT CHANGED

## 2017-12-13 ASSESSMENT — PAIN DESCRIPTION - PAIN TYPE
TYPE: SURGICAL PAIN
TYPE: PHANTOM PAIN

## 2017-12-13 ASSESSMENT — PAIN DESCRIPTION - FREQUENCY: FREQUENCY: CONTINUOUS

## 2017-12-13 ASSESSMENT — PAIN DESCRIPTION - DESCRIPTORS
DESCRIPTORS: ACHING
DESCRIPTORS: ACHING;CONSTANT

## 2017-12-13 NOTE — PROGRESS NOTES
4.6 (L) 12/04/2017    LABALBU 2.5 (L) 12/04/2017    BILITOT 0.3 12/04/2017    ALKPHOS 89 12/04/2017    AST 25 12/04/2017    ALT 13 12/04/2017    LABGLOM >60 12/11/2017     Lab Results   Component Value Date    INR 2.68 (H) 12/13/2017    INR 2.52 (H) 12/12/2017    INR 2.20 (H) 12/11/2017     Lab Results   Component Value Date    CRP 2.96 (H) 11/13/2017       BED MOBILITY  Rolling: Supervision  Supine to Sit: Supervision  Sit to Supine: Supervision  TRANSFERS  Sit to Stand: Contact guard assistance  Bed to Chair: Contact guard assistance     WHEELCHAIR PROPULSION  Level of Assistance: Supervision  Distance: 150  Description/ Details: ABLE OT PERFROM 2 TURNS  AMBULATION  Device: Parallel Bars  Assistance: Minimal assistance  Distance: 5  Quality of Gait: DECREASED STRENGTH RLE WITH UNSTABLE STANCE FOLLOWING SWING PHASE LLE        RECORD REVIEW: Previous medical records, medications were reviewed at today's visit    IMPRESSION:   1. Right below-knee amputation with continued severe painpain control/PT/OT. Has bruising over right hip. No hematoma noted. 2. Diabetes mellitus on sliding scalemonitor. Stable  3. GIbowel regimen  4. History of mechanical heart valve on chronic anticoagulation. Currently on therapeutic dose  Coumadin. (says inr 2-3 pta)  6. History of goutallopurinol. Stable  7. BPHFlomax. Stable  8. Chronic kidney disease stage III. Monitor. Stable  9.  Acute blood loss anemia-  S/p 2 units PRBC 12/11, for hgb <7   Adjust medications    Change Roxicodone to Percocet as [facility] likely will not cover    Team conference with PT/OT/speech/nursing/Care coordinator to review in depth patients care and discharge planning     ft      ELOS December 18th

## 2017-12-13 NOTE — PROGRESS NOTES
12/13/17 Vascular    Patient is s/p right BKA on 12/1/17    PE: up in chair with steven tech on. AAO x3. O2 on per NC. In no acute distress. Fol tech and dressing removed. The stump is somewhat indurated and mildly erythematous. Sutures are intact and there is some bloody drainage noted on the old dressing. There is some old dry bloody drainage noted along the incision line. He reports that he just took a pain pill and that his pain is about a 4-5 on the pain scale. Plan: Continue wound care, pain control, PT and antibiotic therapy. He needs o keep his stump elevated as much as possible to alleviate swelling. I have discussed this with the patient. He verbalized understanding.

## 2017-12-13 NOTE — PROGRESS NOTES
[facility]  Durable Medical Equipment  Medical Necessity Note      Date: 2017  Patient Name: Evelio Anderson        MRN: [id number]    Account Number: [de-identified]  : 1950  (69 y.o.)  Gender: male      Diagnosis: R BKA     Evelio Anderson requires a right elevating swing away leg rest to support his residual limb while seated in his wheelchair to safely propel himself into the bathroom to complete bathing, toileting, dressing and grooming tasks. He needs this device to change locations within the home. Evelio Anderson is able to safely maneuver a standard wheelchair in the home and has been trained in the use of swing away leg rests. 17 1100   Balance   Standing Balance Contact guard assistance   Standing Balance   Activity Toileting task   Sit to stand Contact guard assistance   Stand to sit Contact guard assistance   Comment To/from vertical GB. Functional Mobility   Functional - Mobility Device Wheelchair   Activity To/from bathroom; Other   Assist Level Supervision   Functional Mobility Comments Pt. has been trained and demonstrated ability to use swing away leg rests and consistantly use brakes on w/c.

## 2017-12-13 NOTE — PROGRESS NOTES
Occupational Therapy  Durable Medical Equipment   Physician Order     Patient Name Ellie Caldwell      Patient Phone:  [phone] [facility])  [phone] (Mobile)    Patient Address:   [address] [place] [address]    Patient Height Height: 5' 11\" (180.3 cm)  Patient Weight 205 lb 14.4 oz (93.4 kg)   1950     1. [facility] PART A AND B     F/O Payor/Plan Precert #   MEDICARE/MEDICARE PART A AND B    Subscriber Subscriber #   Zita Oneill [id number]   Address Phone   ROSENDO Mills [address] [place] [address] Rebeka Acre [facility], 1930[facility] [phone]   2. [facility]     F/O Payor/Plan Precert #   [address] #   Zita Oneill [id number]   Address Phone   [address]  5966 [facility] 40, [address]      **DME NEEDS:  RIGHT ELEVATING SWING AWAY LEG REST.     DIAGNOSIS:  S/P BKA (below knee amputation), right ([facility] ICD-10-CM: Z89.511  ICD-9-CM: V49.75       ____________________ _____________________ ________________   Physician Signature      Physician Name (print)   Physician NPI          Date

## 2017-12-14 LAB
ANION GAP SERPL CALCULATED.3IONS-SCNC: 9 MMOL/L (ref 7–19)
BASOPHILS ABSOLUTE: 0 K/UL (ref 0–0.2)
BASOPHILS RELATIVE PERCENT: 0.6 % (ref 0–1)
BUN BLDV-MCNC: 19 MG/DL (ref 8–23)
CALCIUM SERPL-MCNC: 7.6 MG/DL (ref 8.8–10.2)
CHLORIDE BLD-SCNC: 100 MMOL/L (ref 98–111)
CO2: 29 MMOL/L (ref 22–29)
CREAT SERPL-MCNC: 0.9 MG/DL (ref 0.5–1.2)
EOSINOPHILS ABSOLUTE: 0.3 K/UL (ref 0–0.6)
EOSINOPHILS RELATIVE PERCENT: 4.6 % (ref 0–5)
GFR NON-AFRICAN AMERICAN: >60
GLUCOSE BLD-MCNC: 142 MG/DL (ref 70–99)
GLUCOSE BLD-MCNC: 147 MG/DL (ref 70–99)
GLUCOSE BLD-MCNC: 160 MG/DL (ref 74–109)
GLUCOSE BLD-MCNC: 174 MG/DL (ref 70–99)
GLUCOSE BLD-MCNC: 285 MG/DL (ref 70–99)
HCT VFR BLD CALC: 26.3 % (ref 42–52)
HEMOGLOBIN: 8 G/DL (ref 14–18)
INR BLD: 2.62 (ref 0.88–1.18)
LYMPHOCYTES ABSOLUTE: 0.4 K/UL (ref 1.1–4.5)
LYMPHOCYTES RELATIVE PERCENT: 7 % (ref 20–40)
MCH RBC QN AUTO: 27.5 PG (ref 27–31)
MCHC RBC AUTO-ENTMCNC: 30.4 G/DL (ref 33–37)
MCV RBC AUTO: 90.4 FL (ref 80–94)
MONOCYTES ABSOLUTE: 0.4 K/UL (ref 0–0.9)
MONOCYTES RELATIVE PERCENT: 6.3 % (ref 0–10)
NEUTROPHILS ABSOLUTE: 5.1 K/UL (ref 1.5–7.5)
NEUTROPHILS RELATIVE PERCENT: 81.2 % (ref 50–65)
PDW BLD-RTO: 17.3 % (ref 11.5–14.5)
PERFORMED ON: ABNORMAL
PLATELET # BLD: 192 K/UL (ref 130–400)
PMV BLD AUTO: 9.2 FL (ref 9.4–12.4)
POTASSIUM SERPL-SCNC: 3.8 MMOL/L (ref 3.5–5)
PROTHROMBIN TIME: 28.3 SEC (ref 12–14.6)
RBC # BLD: 2.91 M/UL (ref 4.7–6.1)
SODIUM BLD-SCNC: 138 MMOL/L (ref 136–145)
WBC # BLD: 6.3 K/UL (ref 4.8–10.8)

## 2017-12-14 PROCEDURE — 80048 BASIC METABOLIC PNL TOTAL CA: CPT

## 2017-12-14 PROCEDURE — 99233 SBSQ HOSP IP/OBS HIGH 50: CPT | Performed by: HOSPITALIST

## 2017-12-14 PROCEDURE — 6370000000 HC RX 637 (ALT 250 FOR IP): Performed by: FAMILY MEDICINE

## 2017-12-14 PROCEDURE — 2580000003 HC RX 258: Performed by: PSYCHIATRY & NEUROLOGY

## 2017-12-14 PROCEDURE — 99232 SBSQ HOSP IP/OBS MODERATE 35: CPT | Performed by: PSYCHIATRY & NEUROLOGY

## 2017-12-14 PROCEDURE — 97535 SELF CARE MNGMENT TRAINING: CPT

## 2017-12-14 PROCEDURE — 85025 COMPLETE CBC W/AUTO DIFF WBC: CPT

## 2017-12-14 PROCEDURE — 85610 PROTHROMBIN TIME: CPT

## 2017-12-14 PROCEDURE — 1180000000 HC REHAB R&B

## 2017-12-14 PROCEDURE — 6370000000 HC RX 637 (ALT 250 FOR IP): Performed by: PHYSICIAN ASSISTANT

## 2017-12-14 PROCEDURE — 6370000000 HC RX 637 (ALT 250 FOR IP): Performed by: NURSE PRACTITIONER

## 2017-12-14 PROCEDURE — 97110 THERAPEUTIC EXERCISES: CPT

## 2017-12-14 PROCEDURE — 97530 THERAPEUTIC ACTIVITIES: CPT

## 2017-12-14 PROCEDURE — 2700000000 HC OXYGEN THERAPY PER DAY

## 2017-12-14 PROCEDURE — 6370000000 HC RX 637 (ALT 250 FOR IP): Performed by: PSYCHIATRY & NEUROLOGY

## 2017-12-14 PROCEDURE — 82948 REAGENT STRIP/BLOOD GLUCOSE: CPT

## 2017-12-14 PROCEDURE — 6370000000 HC RX 637 (ALT 250 FOR IP): Performed by: SURGERY

## 2017-12-14 RX ORDER — SPIRONOLACTONE 25 MG/1
50 TABLET ORAL DAILY
Status: DISCONTINUED | OUTPATIENT
Start: 2017-12-14 | End: 2017-12-18 | Stop reason: HOSPADM

## 2017-12-14 RX ORDER — GABAPENTIN 400 MG/1
400 CAPSULE ORAL
Status: DISCONTINUED | OUTPATIENT
Start: 2017-12-14 | End: 2017-12-18 | Stop reason: HOSPADM

## 2017-12-14 RX ADMIN — ASPIRIN 81 MG CHEWABLE TABLET 81 MG: 81 TABLET CHEWABLE at 08:00

## 2017-12-14 RX ADMIN — CEPHALEXIN 500 MG: 500 CAPSULE ORAL at 15:08

## 2017-12-14 RX ADMIN — METOPROLOL SUCCINATE 25 MG: 50 TABLET, FILM COATED, EXTENDED RELEASE ORAL at 08:01

## 2017-12-14 RX ADMIN — TAMSULOSIN HYDROCHLORIDE 0.4 MG: 0.4 CAPSULE ORAL at 08:00

## 2017-12-14 RX ADMIN — Medication 10 ML: at 08:08

## 2017-12-14 RX ADMIN — TIOTROPIUM BROMIDE 18 MCG: 18 CAPSULE ORAL; RESPIRATORY (INHALATION) at 07:59

## 2017-12-14 RX ADMIN — WARFARIN SODIUM 7.5 MG: 2.5 TABLET ORAL at 17:24

## 2017-12-14 RX ADMIN — ALLOPURINOL 100 MG: 100 TABLET ORAL at 08:00

## 2017-12-14 RX ADMIN — OXYCODONE HYDROCHLORIDE AND ACETAMINOPHEN 1 TABLET: 10; 325 TABLET ORAL at 09:19

## 2017-12-14 RX ADMIN — INSULIN GLARGINE 47 UNITS: 100 INJECTION, SOLUTION SUBCUTANEOUS at 21:32

## 2017-12-14 RX ADMIN — OXYCODONE HYDROCHLORIDE AND ACETAMINOPHEN 1 TABLET: 10; 325 TABLET ORAL at 21:04

## 2017-12-14 RX ADMIN — TRAZODONE HYDROCHLORIDE 100 MG: 50 TABLET, FILM COATED ORAL at 20:58

## 2017-12-14 RX ADMIN — GABAPENTIN 400 MG: 400 CAPSULE ORAL at 09:19

## 2017-12-14 RX ADMIN — BUMETANIDE 2 MG: 1 TABLET ORAL at 07:59

## 2017-12-14 RX ADMIN — GABAPENTIN 600 MG: 300 CAPSULE ORAL at 17:23

## 2017-12-14 RX ADMIN — GUAIFENESIN 1200 MG: 600 TABLET, EXTENDED RELEASE ORAL at 08:00

## 2017-12-14 RX ADMIN — GABAPENTIN 400 MG: 400 CAPSULE ORAL at 12:13

## 2017-12-14 RX ADMIN — SPIRONOLACTONE 50 MG: 25 TABLET, FILM COATED ORAL at 21:19

## 2017-12-14 RX ADMIN — INSULIN LISPRO 3 UNITS: 100 INJECTION, SOLUTION INTRAVENOUS; SUBCUTANEOUS at 08:08

## 2017-12-14 RX ADMIN — OXYCODONE HYDROCHLORIDE AND ACETAMINOPHEN 1 TABLET: 10; 325 TABLET ORAL at 14:11

## 2017-12-14 RX ADMIN — GUAIFENESIN 1200 MG: 600 TABLET, EXTENDED RELEASE ORAL at 20:58

## 2017-12-14 RX ADMIN — LATANOPROST 1 DROP: 50 SOLUTION OPHTHALMIC at 20:58

## 2017-12-14 RX ADMIN — OXYCODONE HYDROCHLORIDE AND ACETAMINOPHEN 1 TABLET: 10; 325 TABLET ORAL at 04:20

## 2017-12-14 RX ADMIN — ATORVASTATIN CALCIUM 40 MG: 40 TABLET, FILM COATED ORAL at 20:58

## 2017-12-14 RX ADMIN — CEPHALEXIN 500 MG: 500 CAPSULE ORAL at 04:20

## 2017-12-14 RX ADMIN — INSULIN LISPRO 5 UNITS: 100 INJECTION, SOLUTION INTRAVENOUS; SUBCUTANEOUS at 21:15

## 2017-12-14 RX ADMIN — INSULIN LISPRO 3 UNITS: 100 INJECTION, SOLUTION INTRAVENOUS; SUBCUTANEOUS at 12:14

## 2017-12-14 RX ADMIN — ALLOPURINOL 100 MG: 100 TABLET ORAL at 20:58

## 2017-12-14 RX ADMIN — FLUOXETINE HYDROCHLORIDE 20 MG: 20 CAPSULE ORAL at 20:58

## 2017-12-14 RX ADMIN — CEPHALEXIN 500 MG: 500 CAPSULE ORAL at 21:04

## 2017-12-14 ASSESSMENT — PAIN DESCRIPTION - ONSET: ONSET: ON-GOING

## 2017-12-14 ASSESSMENT — PAIN DESCRIPTION - ORIENTATION
ORIENTATION: LOWER;RIGHT
ORIENTATION: RIGHT

## 2017-12-14 ASSESSMENT — PAIN DESCRIPTION - LOCATION
LOCATION: LEG

## 2017-12-14 ASSESSMENT — PAIN SCALES - GENERAL
PAINLEVEL_OUTOF10: 8
PAINLEVEL_OUTOF10: 4
PAINLEVEL_OUTOF10: 6
PAINLEVEL_OUTOF10: 6
PAINLEVEL_OUTOF10: 0
PAINLEVEL_OUTOF10: 6
PAINLEVEL_OUTOF10: 8
PAINLEVEL_OUTOF10: 9
PAINLEVEL_OUTOF10: 3
PAINLEVEL_OUTOF10: 6

## 2017-12-14 ASSESSMENT — PAIN DESCRIPTION - PROGRESSION
CLINICAL_PROGRESSION: NOT CHANGED

## 2017-12-14 ASSESSMENT — PAIN DESCRIPTION - DESCRIPTORS
DESCRIPTORS: ACHING
DESCRIPTORS: ACHING
DESCRIPTORS: SHOOTING
DESCRIPTORS: ACHING;CONSTANT

## 2017-12-14 ASSESSMENT — PAIN DESCRIPTION - PAIN TYPE
TYPE: SURGICAL PAIN
TYPE: ACUTE PAIN;SURGICAL PAIN
TYPE: SURGICAL PAIN
TYPE: SURGICAL PAIN

## 2017-12-14 ASSESSMENT — PAIN DESCRIPTION - FREQUENCY
FREQUENCY: CONTINUOUS

## 2017-12-14 NOTE — PROGRESS NOTES
EXAM:  BP (!) 145/68   Pulse 90   Temp 98.4 °F (36.9 °C) (Temporal)   Resp 16   Ht 5' 11\" (1.803 m)   Wt 205 lb 6.4 oz (93.2 kg)   SpO2 91%   BMI 28.65 kg/m²     Constitutional: he appears well-developed and well-nourished. Eyes  conjunctiva normal.  Pupils react to light  Ear, nose, throat -hearing intact to finger rub No scars, masses, or lesions over external nose or ears, no atrophy of tongue  Neck-symmetric, no masses noted, no jugular vein distension  Respiration- chest wall appears symmetric, good expansion,   normal effort without use of accessory muscles  Musculoskeletal  no significant wasting of muscles noted, no bony deformities, gait no gross ataxia  Extremities-no clubbing, cyanosis or edema  Skin  warm, dry, and intact. No rash, erythema, or pallor. Psychiatric  mood, affect, and behavior appear normal.      Neurology  NEUROLOGICAL EXAM:      Mental status   Awake, alert, fluent oriented x 3 appropriate affect  Attention and concentration appear appropriate  Speech normal without dysarthria  No clear issues with language       Cranial Nerves   CN II- Visual fields grossly unremarkable  CN III, IV,VI-EOMI, No nystagmus, conjugate eye movements, no ptosis  CN VII-no facial assymetry  CN VIII-Hearing intact      Motor function  Antigravity x 4  With right below knee amputation        Cerebellar F-N intact     Tremor None present     Gait                  Not tested           Nursing/pcp notes, imaging,labs and vitals reviewed.      PT,OT and/or speech notes reviewed    Lab Results   Component Value Date    WBC 6.3 12/14/2017    HGB 8.0 (L) 12/14/2017    HCT 26.3 (L) 12/14/2017    MCV 90.4 12/14/2017     12/14/2017     Lab Results   Component Value Date     12/14/2017    K 3.8 12/14/2017     12/14/2017    CO2 29 12/14/2017    BUN 19 12/14/2017    CREATININE 0.9 12/14/2017    GLUCOSE 160 (H) 12/14/2017    CALCIUM 7.6 (L) 12/14/2017    PROT 4.6 (L) 12/04/2017    LABALBU 2.5 (L) 12/04/2017    BILITOT 0.3 12/04/2017    ALKPHOS 89 12/04/2017    AST 25 12/04/2017    ALT 13 12/04/2017    LABGLOM >60 12/14/2017     Lab Results   Component Value Date    INR 2.62 (H) 12/14/2017    INR 2.68 (H) 12/13/2017    INR 2.52 (H) 12/12/2017     Lab Results   Component Value Date    CRP 2.96 (H) 11/13/2017       BED MOBILITY  Rolling: Supervision  Supine to Sit: Supervision  Sit to Supine: Supervision  TRANSFERS  Sit to Stand: Contact guard assistance  Bed to Chair: Contact guard assistance     WHEELCHAIR PROPULSION  Level of Assistance: Supervision  Distance: 150  Description/ Details: ABLE OT PERFROM 2 TURNS  AMBULATION  Device: Parallel Bars  Assistance: Minimal assistance  Distance: 5  Quality of Gait: DECREASED STRENGTH RLE WITH UNSTABLE STANCE FOLLOWING SWING PHASE LLE        RECORD REVIEW: Previous medical records, medications were reviewed at today's visit    IMPRESSION:   1. Right below-knee amputation with continued severe painpain control/PT/OT. Has bruising over right hip. No hematoma noted. 2. Diabetes mellitus on sliding scalemonitor. Stable  3. GIbowel regimen  4. History of mechanical heart valve on chronic anticoagulation. Currently on therapeutic dose  Coumadin. (says inr 2-3 pta)  6. History of goutallopurinol. Stable  7. BPHFlomax. Stable  8. Chronic kidney disease stage III. Monitor. Stable  9. Acute blood loss anemia-  S/p 2 units PRBC 12/11, for hgb <7.   Now stable  neurontin increased   dc 12/18 normal for race

## 2017-12-14 NOTE — PROGRESS NOTES
Wound Care  Asked to see patient in PT as RBKA dressing bunched up under the steven tech and uncomfortable for patient. Dressing was off, noted incision line approximated with dark red dried draiange, noted mid incision line moist red. RBKA with erythema, tender to touch. Cleaned with SNS and pat dry. Fluff gauze to incision line, secured with kerlix and 6 inch ACE wrap. Flotech reapplied. Notified Oc Munoz RN Vascular, she will be following up with patient today after therapy. Stated patient was started on Keflex on Monday.

## 2017-12-15 LAB
GLUCOSE BLD-MCNC: 174 MG/DL (ref 70–99)
GLUCOSE BLD-MCNC: 189 MG/DL (ref 70–99)
GLUCOSE BLD-MCNC: 191 MG/DL (ref 70–99)
GLUCOSE BLD-MCNC: 237 MG/DL (ref 70–99)
INR BLD: 2.84 (ref 0.88–1.18)
PERFORMED ON: ABNORMAL
PROTHROMBIN TIME: 30.2 SEC (ref 12–14.6)

## 2017-12-15 PROCEDURE — 6370000000 HC RX 637 (ALT 250 FOR IP): Performed by: NURSE PRACTITIONER

## 2017-12-15 PROCEDURE — 82948 REAGENT STRIP/BLOOD GLUCOSE: CPT

## 2017-12-15 PROCEDURE — 6370000000 HC RX 637 (ALT 250 FOR IP): Performed by: PHYSICIAN ASSISTANT

## 2017-12-15 PROCEDURE — 97535 SELF CARE MNGMENT TRAINING: CPT

## 2017-12-15 PROCEDURE — 6370000000 HC RX 637 (ALT 250 FOR IP): Performed by: SURGERY

## 2017-12-15 PROCEDURE — 1180000000 HC REHAB R&B

## 2017-12-15 PROCEDURE — 85610 PROTHROMBIN TIME: CPT

## 2017-12-15 PROCEDURE — 2700000000 HC OXYGEN THERAPY PER DAY

## 2017-12-15 PROCEDURE — 99232 SBSQ HOSP IP/OBS MODERATE 35: CPT | Performed by: PSYCHIATRY & NEUROLOGY

## 2017-12-15 PROCEDURE — 6370000000 HC RX 637 (ALT 250 FOR IP): Performed by: PSYCHIATRY & NEUROLOGY

## 2017-12-15 PROCEDURE — 97110 THERAPEUTIC EXERCISES: CPT

## 2017-12-15 PROCEDURE — 97530 THERAPEUTIC ACTIVITIES: CPT

## 2017-12-15 PROCEDURE — 2580000003 HC RX 258: Performed by: PSYCHIATRY & NEUROLOGY

## 2017-12-15 PROCEDURE — 6370000000 HC RX 637 (ALT 250 FOR IP): Performed by: FAMILY MEDICINE

## 2017-12-15 RX ADMIN — ALLOPURINOL 100 MG: 100 TABLET ORAL at 08:50

## 2017-12-15 RX ADMIN — SPIRONOLACTONE 50 MG: 25 TABLET, FILM COATED ORAL at 08:49

## 2017-12-15 RX ADMIN — CEPHALEXIN 500 MG: 500 CAPSULE ORAL at 05:19

## 2017-12-15 RX ADMIN — GABAPENTIN 600 MG: 300 CAPSULE ORAL at 17:58

## 2017-12-15 RX ADMIN — LEVOTHYROXINE SODIUM 75 MCG: 50 TABLET ORAL at 05:18

## 2017-12-15 RX ADMIN — Medication 10 ML: at 21:00

## 2017-12-15 RX ADMIN — LATANOPROST 1 DROP: 50 SOLUTION OPHTHALMIC at 20:32

## 2017-12-15 RX ADMIN — ASPIRIN 81 MG CHEWABLE TABLET 81 MG: 81 TABLET CHEWABLE at 08:50

## 2017-12-15 RX ADMIN — OXYCODONE HYDROCHLORIDE AND ACETAMINOPHEN 1 TABLET: 10; 325 TABLET ORAL at 01:25

## 2017-12-15 RX ADMIN — INSULIN LISPRO 6 UNITS: 100 INJECTION, SOLUTION INTRAVENOUS; SUBCUTANEOUS at 12:11

## 2017-12-15 RX ADMIN — OXYCODONE HYDROCHLORIDE AND ACETAMINOPHEN 1 TABLET: 10; 325 TABLET ORAL at 05:19

## 2017-12-15 RX ADMIN — TAMSULOSIN HYDROCHLORIDE 0.4 MG: 0.4 CAPSULE ORAL at 08:50

## 2017-12-15 RX ADMIN — OXYCODONE HYDROCHLORIDE AND ACETAMINOPHEN 1 TABLET: 10; 325 TABLET ORAL at 16:17

## 2017-12-15 RX ADMIN — TIOTROPIUM BROMIDE 18 MCG: 18 CAPSULE ORAL; RESPIRATORY (INHALATION) at 08:50

## 2017-12-15 RX ADMIN — CEPHALEXIN 500 MG: 500 CAPSULE ORAL at 21:30

## 2017-12-15 RX ADMIN — ATORVASTATIN CALCIUM 40 MG: 40 TABLET, FILM COATED ORAL at 20:31

## 2017-12-15 RX ADMIN — FLUOXETINE HYDROCHLORIDE 20 MG: 20 CAPSULE ORAL at 20:31

## 2017-12-15 RX ADMIN — WARFARIN SODIUM 7.5 MG: 2.5 TABLET ORAL at 17:58

## 2017-12-15 RX ADMIN — GUAIFENESIN 1200 MG: 600 TABLET, EXTENDED RELEASE ORAL at 20:31

## 2017-12-15 RX ADMIN — ALLOPURINOL 100 MG: 100 TABLET ORAL at 10:00

## 2017-12-15 RX ADMIN — INSULIN LISPRO 3 UNITS: 100 INJECTION, SOLUTION INTRAVENOUS; SUBCUTANEOUS at 17:56

## 2017-12-15 RX ADMIN — GUAIFENESIN 1200 MG: 600 TABLET, EXTENDED RELEASE ORAL at 08:50

## 2017-12-15 RX ADMIN — OXYCODONE HYDROCHLORIDE AND ACETAMINOPHEN 1 TABLET: 10; 325 TABLET ORAL at 21:47

## 2017-12-15 RX ADMIN — OXYCODONE HYDROCHLORIDE AND ACETAMINOPHEN 1 TABLET: 10; 325 TABLET ORAL at 10:59

## 2017-12-15 RX ADMIN — PANTOPRAZOLE SODIUM 40 MG: 40 TABLET, DELAYED RELEASE ORAL at 05:19

## 2017-12-15 RX ADMIN — INSULIN LISPRO 3 UNITS: 100 INJECTION, SOLUTION INTRAVENOUS; SUBCUTANEOUS at 07:52

## 2017-12-15 RX ADMIN — GABAPENTIN 400 MG: 400 CAPSULE ORAL at 08:49

## 2017-12-15 RX ADMIN — GABAPENTIN 400 MG: 400 CAPSULE ORAL at 12:10

## 2017-12-15 RX ADMIN — Medication 10 ML: at 08:50

## 2017-12-15 RX ADMIN — METOPROLOL SUCCINATE 25 MG: 50 TABLET, FILM COATED, EXTENDED RELEASE ORAL at 08:50

## 2017-12-15 RX ADMIN — BUMETANIDE 2 MG: 1 TABLET ORAL at 08:50

## 2017-12-15 RX ADMIN — CEPHALEXIN 500 MG: 500 CAPSULE ORAL at 13:06

## 2017-12-15 ASSESSMENT — PAIN SCALES - GENERAL
PAINLEVEL_OUTOF10: 6
PAINLEVEL_OUTOF10: 7
PAINLEVEL_OUTOF10: 9
PAINLEVEL_OUTOF10: 0
PAINLEVEL_OUTOF10: 2
PAINLEVEL_OUTOF10: 5
PAINLEVEL_OUTOF10: 7
PAINLEVEL_OUTOF10: 4
PAINLEVEL_OUTOF10: 4
PAINLEVEL_OUTOF10: 6
PAINLEVEL_OUTOF10: 4
PAINLEVEL_OUTOF10: 8
PAINLEVEL_OUTOF10: 6
PAINLEVEL_OUTOF10: 7

## 2017-12-15 ASSESSMENT — PAIN DESCRIPTION - PROGRESSION
CLINICAL_PROGRESSION: NOT CHANGED
CLINICAL_PROGRESSION: NOT CHANGED

## 2017-12-15 ASSESSMENT — PAIN DESCRIPTION - PAIN TYPE
TYPE: ACUTE PAIN
TYPE: SURGICAL PAIN
TYPE: ACUTE PAIN;SURGICAL PAIN

## 2017-12-15 ASSESSMENT — PAIN DESCRIPTION - LOCATION
LOCATION: LEG
LOCATION: LEG

## 2017-12-15 ASSESSMENT — PAIN DESCRIPTION - ORIENTATION
ORIENTATION: RIGHT;LOWER
ORIENTATION: RIGHT

## 2017-12-15 ASSESSMENT — PAIN DESCRIPTION - FREQUENCY
FREQUENCY: CONTINUOUS
FREQUENCY: CONTINUOUS

## 2017-12-15 ASSESSMENT — PAIN DESCRIPTION - DESCRIPTORS
DESCRIPTORS: ACHING;BURNING
DESCRIPTORS: ACHING;BURNING

## 2017-12-15 ASSESSMENT — PAIN DESCRIPTION - ONSET: ONSET: ON-GOING

## 2017-12-15 NOTE — PROGRESS NOTES
840 ml   Output              600 ml   Net              240 ml     General appearance: alert, appears stated age, cooperative and no distress  Head: Normocephalic, without obvious abnormality, atraumatic  Eyes: conjunctivae/corneas clear. PERRL, EOM's intact. Ears: normal external ears and nose, throat without exudate  Neck: no adenopathy, no carotid bruit, no JVD, supple, symmetrical, trachea midline and thyroid not enlarged, symmetric, no tenderness/mass/nodules  Lungs: Diminished bilateral bases otherwise clear to auscultation bilaterally,no rales or wheezes   Heart: Normal prosthetic heart tones  S1, S2 normal, no murmur  Abdomen:soft, non-tender; moderately distended, normal bowel sounds no masses, no organomegaly  Extremities:s/p right BKA, [employer]is[employer] on, LLE trace edema, no tenderness to palpation  Skin: Abisai-tech on I did not remove to inspect incision, several ecchymotic areas lower abdomen otherwise warm and dry  Lymphatic: No palpable lymph node enlargment  Neurologic: Alert and oriented X 3, normal strength and tone. Normal symmetric reflexes.  Mental status: Alert, oriented, thought content appropriate  Cranial nerves:II-XII Grossly intact Sensory: normal Motor:grossly normal  Psychiatric: Alert and oriented, thought content appropriate, normal insight, mood appropriate    Medications:       gabapentin  400 mg Oral 2 times per day    gabapentin  600 mg Oral QPM    cephALEXin  500 mg Oral 3 times per day    lidocaine 1 % injection  5 mL Intradermal Once    sodium chloride flush  10 mL Intravenous 2 times per day    warfarin  7.5 mg Oral Daily    bumetanide  2 mg Oral Daily    insulin lispro  0-18 Units Subcutaneous TID WC    insulin lispro  0-9 Units Subcutaneous Nightly    allopurinol  100 mg Oral BID    aspirin  81 mg Oral Daily    atorvastatin  40 mg Oral Nightly    FLUoxetine  20 mg Oral Nightly    guaiFENesin  1,200 mg Oral BID    insulin glargine  47 Units Subcutaneous Nightly    lactulose  20 g Oral Daily    latanoprost  1 drop Both Eyes Nightly    levothyroxine  75 mcg Oral Daily    metoprolol succinate  25 mg Oral Daily    pantoprazole  40 mg Oral QAM AC    tamsulosin  0.4 mg Oral Daily    tiotropium  18 mcg Inhalation Daily    traZODone  100 mg Oral Nightly    [START ON 12/4/2018] warfarin (COUMADIN) daily dosing (placeholder)   Other RX Placeholder     oxyCODONE-acetaminophen, sodium chloride flush, traMADol, acetaminophen, glucagon (rDNA), glucose, calcium carbonate, acetaminophen, magnesium hydroxide, bisacodyl  DIET CARB CONTROL; Carb Control: 4 carbs/meal (approximate 1800 kcals/day); No Added Salt (3-4 GM); Daily Fluid Restriction: 1800 ml     Lab and other Data:     Recent Labs      12/12/17   1910  12/14/17   0250   WBC  8.2  6.3   HGB  8.1*  8.0*   PLT  187  192     Recent Labs      12/14/17   0250   NA  138   K  3.8   CL  100   CO2  29   BUN  19   CREATININE  0.9   GLUCOSE  160*     INR:   Recent Labs      12/12/17   0605  12/13/17   0300  12/14/17   0250   INR  2.52*  2.68*  2.62*       RAD:   CXR 12/11/2017  Findings suggest pulmonary vascular congestion with  pulmonary edema and trace pleural fluid bilaterally. Echo: 12/11/2017   Normal functioning bioprosthetic valve in the aortic position.   Trivial aortic regurgitation is noted.   Normal left ventricular size and function.   Mild concentric left ventricular hypertrophy.     Urine culture collected 12/06/2017 No growth     Assessment/Plan   Principal Problem:    S/P BKA (below knee amputation), right (HCC)-noted, mgmt per Dr. Alessandro Carlisle, keep elevated as much as possible  Active Problems:    History of heart valve replacement with mechanical valve-noted, anticoagulation continued, INR therapeutic    Type 2 diabetes mellitus with foot ulcer, with long-term current use of insulin (HCC)-SSI, fair control    Essential hypertension-stable, hypotension has resolved    Mixed hyperlipidemia-statin    Glaucoma-noted    Stage 3 chronic kidney disease-stable    Acute blood loss as cause of postoperative anemia-stable    Cirrhosis of liver-noted    Will resume spironolactone, monitor I/O's, keep lower extremity elevated. Antibiotic: Keflex    DVT Prophylaxis: Coumadin    GI prophylaxis:  Protonix     Malia Navarro PA-C     I have independently interviewed and examined the patient. I have discussed key elements of the care plan with the PA or APRN & agree with the findings and care plan as documented above. Jan Wade MD    CC: Doing better  S: Stump swelling and pain from R BKA improving, no CP, SOA or other complaint, no increase in abdomen, no TIA sx. O:Vitals: /64   Pulse 81   Temp 98.6 °F (37 °C) (Temporal)   Resp 16   Ht 5' 11\" (1.803 m)   Wt 205 lb 6.4 oz (93.2 kg)   SpO2 95%   BMI 28.65 kg/m²   24HR INTAKE/OUTPUT:    Intake/Output Summary (Last 24 hours) at 12/14/17 8740  Last data filed at 12/14/17 1837   Gross per 24 hour   Intake             1080 ml   Output              600 ml   Net              480 ml     General appearance: alert and cooperative with exam  HEENT: atraumatic, eyes with clear conjunctiva and normal lids, pupils and irises normal, external ears and nose are normal, lips normal.  Neck without masses, lympadenopathy, bruit, thyroid normal  Lungs: no increased work of breathing, clear to auscultation bilaterally without rales, rhonchi or wheezes  Heart: regular rate and rhythm, S1, S2 normal, no murmur, normal crisp prosthetic valve sounds, no rub or gallop  Abdomen: soft, non-tender; bowel sounds normal; no masses,  no organomegaly  Extremities: 3 extremities normal, atraumatic, no cyanosis or edema (R BKA)  Neurologic: No focal neurologic deficits, normal sensation, alert and oriented, affect and mood appropriate. Skin: no rashes, nodules.   A/P: Improving clinically, aldactone added back for the ascites, INR appropriate,

## 2017-12-15 NOTE — PROGRESS NOTES
BILITOT 0.3 12/04/2017    ALKPHOS 89 12/04/2017    AST 25 12/04/2017    ALT 13 12/04/2017    LABGLOM >60 12/14/2017     Lab Results   Component Value Date    INR 2.84 (H) 12/15/2017    INR 2.62 (H) 12/14/2017    INR 2.68 (H) 12/13/2017     Lab Results   Component Value Date    CRP 2.96 (H) 11/13/2017       BED MOBILITY  Rolling: Supervision  Supine to Sit: Supervision  Sit to Supine: Supervision  TRANSFERS  Sit to Stand: Contact guard assistance  Bed to Chair: Contact guard assistance     WHEELCHAIR PROPULSION  Level of Assistance: Supervision  Distance: 150  Description/ Details: ABLE OT PERFROM 2 TURNS  AMBULATION  Device: Parallel Bars  Assistance: Minimal assistance  Distance: 5  Quality of Gait: DECREASED STRENGTH RLE WITH UNSTABLE STANCE FOLLOWING SWING PHASE LLE        RECORD REVIEW: Previous medical records, medications were reviewed at today's visit    IMPRESSION:   1. Right below-knee amputation with continued severe painpain control/PT/OT. Has bruising over right hip. No hematoma noted. 2. Diabetes mellitus on sliding scalemonitor. Stable  3. GIbowel regimen  4. History of mechanical heart valve on chronic anticoagulation. Currently on therapeutic dose  Coumadin. (says inr 2-3 pta)  6. History of goutallopurinol. Stable  7. BPHFlomax. Stable  8. Chronic kidney disease stage III. Monitor. Stable  9. Acute blood loss anemia-  S/p 2 units PRBC 12/11, for hgb <7.   Now stable  Aldactone resumed, per IM   dc 12/18

## 2017-12-15 NOTE — PLAN OF CARE
Problem: Falls - Risk of  Goal: Absence of falls  Outcome: Ongoing      Problem: Risk for Impaired Skin Integrity  Goal: Tissue integrity - skin and mucous membranes  Structural intactness and normal physiological function of skin and  mucous membranes.    Outcome: Ongoing      Problem: Pain:  Goal: Pain level will decrease  Pain level will decrease   Outcome: Ongoing      Problem: Infection - Risk of, Central Venous Catheter-Associated Bloodstream Infection  Goal: Absence of central venous catheter-associated bloodstream infection  Outcome: Ongoing

## 2017-12-16 LAB
GLUCOSE BLD-MCNC: 155 MG/DL (ref 70–99)
GLUCOSE BLD-MCNC: 156 MG/DL (ref 70–99)
GLUCOSE BLD-MCNC: 230 MG/DL (ref 70–99)
GLUCOSE BLD-MCNC: 314 MG/DL (ref 70–99)
INR BLD: 3.11 (ref 0.88–1.18)
PERFORMED ON: ABNORMAL
PROTHROMBIN TIME: 32.5 SEC (ref 12–14.6)

## 2017-12-16 PROCEDURE — 82948 REAGENT STRIP/BLOOD GLUCOSE: CPT

## 2017-12-16 PROCEDURE — 6370000000 HC RX 637 (ALT 250 FOR IP): Performed by: NURSE PRACTITIONER

## 2017-12-16 PROCEDURE — 6370000000 HC RX 637 (ALT 250 FOR IP): Performed by: PSYCHIATRY & NEUROLOGY

## 2017-12-16 PROCEDURE — 97530 THERAPEUTIC ACTIVITIES: CPT

## 2017-12-16 PROCEDURE — 1180000000 HC REHAB R&B

## 2017-12-16 PROCEDURE — 6370000000 HC RX 637 (ALT 250 FOR IP): Performed by: FAMILY MEDICINE

## 2017-12-16 PROCEDURE — 99232 SBSQ HOSP IP/OBS MODERATE 35: CPT | Performed by: PSYCHIATRY & NEUROLOGY

## 2017-12-16 PROCEDURE — 97535 SELF CARE MNGMENT TRAINING: CPT

## 2017-12-16 PROCEDURE — 6370000000 HC RX 637 (ALT 250 FOR IP): Performed by: SURGERY

## 2017-12-16 PROCEDURE — 85610 PROTHROMBIN TIME: CPT

## 2017-12-16 PROCEDURE — 2700000000 HC OXYGEN THERAPY PER DAY

## 2017-12-16 PROCEDURE — 6370000000 HC RX 637 (ALT 250 FOR IP): Performed by: PHYSICIAN ASSISTANT

## 2017-12-16 PROCEDURE — 99233 SBSQ HOSP IP/OBS HIGH 50: CPT | Performed by: HOSPITALIST

## 2017-12-16 PROCEDURE — 2580000003 HC RX 258: Performed by: PSYCHIATRY & NEUROLOGY

## 2017-12-16 RX ORDER — METOLAZONE 2.5 MG/1
2.5 TABLET ORAL DAILY
Status: DISCONTINUED | OUTPATIENT
Start: 2017-12-16 | End: 2017-12-18 | Stop reason: HOSPADM

## 2017-12-16 RX ADMIN — METOPROLOL SUCCINATE 25 MG: 50 TABLET, FILM COATED, EXTENDED RELEASE ORAL at 08:00

## 2017-12-16 RX ADMIN — INSULIN LISPRO 3 UNITS: 100 INJECTION, SOLUTION INTRAVENOUS; SUBCUTANEOUS at 17:48

## 2017-12-16 RX ADMIN — GABAPENTIN 400 MG: 400 CAPSULE ORAL at 12:26

## 2017-12-16 RX ADMIN — LATANOPROST 1 DROP: 50 SOLUTION OPHTHALMIC at 21:06

## 2017-12-16 RX ADMIN — ASPIRIN 81 MG CHEWABLE TABLET 81 MG: 81 TABLET CHEWABLE at 08:00

## 2017-12-16 RX ADMIN — TRAMADOL HYDROCHLORIDE 50 MG: 50 TABLET, COATED ORAL at 11:25

## 2017-12-16 RX ADMIN — OXYCODONE HYDROCHLORIDE AND ACETAMINOPHEN 1 TABLET: 10; 325 TABLET ORAL at 03:16

## 2017-12-16 RX ADMIN — OXYCODONE HYDROCHLORIDE AND ACETAMINOPHEN 1 TABLET: 10; 325 TABLET ORAL at 17:47

## 2017-12-16 RX ADMIN — INSULIN GLARGINE 47 UNITS: 100 INJECTION, SOLUTION SUBCUTANEOUS at 21:09

## 2017-12-16 RX ADMIN — TIOTROPIUM BROMIDE 18 MCG: 18 CAPSULE ORAL; RESPIRATORY (INHALATION) at 07:58

## 2017-12-16 RX ADMIN — GUAIFENESIN 1200 MG: 600 TABLET, EXTENDED RELEASE ORAL at 08:01

## 2017-12-16 RX ADMIN — CEPHALEXIN 500 MG: 500 CAPSULE ORAL at 14:19

## 2017-12-16 RX ADMIN — TRAZODONE HYDROCHLORIDE 100 MG: 50 TABLET, FILM COATED ORAL at 00:00

## 2017-12-16 RX ADMIN — GUAIFENESIN 1200 MG: 600 TABLET, EXTENDED RELEASE ORAL at 21:04

## 2017-12-16 RX ADMIN — ALLOPURINOL 100 MG: 100 TABLET ORAL at 08:00

## 2017-12-16 RX ADMIN — LEVOTHYROXINE SODIUM 75 MCG: 50 TABLET ORAL at 05:42

## 2017-12-16 RX ADMIN — Medication 10 ML: at 08:09

## 2017-12-16 RX ADMIN — OXYCODONE HYDROCHLORIDE AND ACETAMINOPHEN 1 TABLET: 10; 325 TABLET ORAL at 08:00

## 2017-12-16 RX ADMIN — ALLOPURINOL 100 MG: 100 TABLET ORAL at 21:06

## 2017-12-16 RX ADMIN — TAMSULOSIN HYDROCHLORIDE 0.4 MG: 0.4 CAPSULE ORAL at 08:01

## 2017-12-16 RX ADMIN — PANTOPRAZOLE SODIUM 40 MG: 40 TABLET, DELAYED RELEASE ORAL at 05:42

## 2017-12-16 RX ADMIN — METOLAZONE 2.5 MG: 2.5 TABLET ORAL at 21:12

## 2017-12-16 RX ADMIN — CEPHALEXIN 500 MG: 500 CAPSULE ORAL at 21:05

## 2017-12-16 RX ADMIN — Medication 10 ML: at 22:02

## 2017-12-16 RX ADMIN — TRAZODONE HYDROCHLORIDE 100 MG: 50 TABLET, FILM COATED ORAL at 21:05

## 2017-12-16 RX ADMIN — TRAMADOL HYDROCHLORIDE 50 MG: 50 TABLET, COATED ORAL at 19:24

## 2017-12-16 RX ADMIN — INSULIN LISPRO 3 UNITS: 100 INJECTION, SOLUTION INTRAVENOUS; SUBCUTANEOUS at 08:02

## 2017-12-16 RX ADMIN — ATORVASTATIN CALCIUM 40 MG: 40 TABLET, FILM COATED ORAL at 21:05

## 2017-12-16 RX ADMIN — SPIRONOLACTONE 50 MG: 25 TABLET, FILM COATED ORAL at 07:59

## 2017-12-16 RX ADMIN — WARFARIN SODIUM 7.5 MG: 2.5 TABLET ORAL at 17:42

## 2017-12-16 RX ADMIN — GABAPENTIN 400 MG: 400 CAPSULE ORAL at 08:00

## 2017-12-16 RX ADMIN — FLUOXETINE HYDROCHLORIDE 20 MG: 20 CAPSULE ORAL at 21:05

## 2017-12-16 RX ADMIN — INSULIN LISPRO 6 UNITS: 100 INJECTION, SOLUTION INTRAVENOUS; SUBCUTANEOUS at 12:26

## 2017-12-16 RX ADMIN — BUMETANIDE 2 MG: 1 TABLET ORAL at 07:59

## 2017-12-16 RX ADMIN — CEPHALEXIN 500 MG: 500 CAPSULE ORAL at 05:42

## 2017-12-16 RX ADMIN — GABAPENTIN 600 MG: 300 CAPSULE ORAL at 17:42

## 2017-12-16 ASSESSMENT — PAIN SCALES - GENERAL
PAINLEVEL_OUTOF10: 5
PAINLEVEL_OUTOF10: 0
PAINLEVEL_OUTOF10: 8
PAINLEVEL_OUTOF10: 5
PAINLEVEL_OUTOF10: 5
PAINLEVEL_OUTOF10: 6
PAINLEVEL_OUTOF10: 0

## 2017-12-16 NOTE — PROGRESS NOTES
[address] [facility]      Patient:    Choco Doyle  YOB: 1950  Date of Service:  12/16/2017  MRN:    [id number]    Room:   [address] [place] [address]   PCP:    Carolee Nation NP  Advance Directive:  Full Code  Admit Date:   12/6/2017       Hospital Day:  10    CHIEF COMPLAINT  S/P RBKA with revision 12/1/17    SUBJECTIVE:  I am feeling much better. My abdomen is not quit as tight and my left leg is not as swollen. REVIEW OF SYSTEMS:    14 point ROS (-) unless otherwise indicated above. OBJECTIVE:     VITALS:  BP (!) 140/70   Pulse 70   Temp 98 °F (36.7 °C) (Temporal)   Resp 17   Ht 5' 11\" (1.803 m)   Wt 209 lb 0.1 oz (94.8 kg)   SpO2 96%   BMI 29.15 kg/m²     24HR INTAKE/OUTPUT:      Intake/Output Summary (Last 24 hours) at 12/16/17 1810  Last data filed at 12/16/17 1406   Gross per 24 hour   Intake             1080 ml   Output                0 ml   Net             1080 ml       BOWEL MOVEMENT:   12/15/17    PHYSICAL EXAM:  Constitutional:  Well-developed and well-nourished. Appears stated age. No distress. HENT:    Head: Normocephalic and atraumatic.    Mouth/Throat: Oropharynx is clear and moist. No oropharyngeal exudate. Eyes: Conjunctivae normal and EOMI. PERRAL. No scleral icterus. Neck: Normal ROM. Neck supple. No JVD, tracheal deviation, or thyromegaly present. Cardiovascular: Normal rate, rhythm, and heart sounds with click from prosthetic valve. No gallop, friction rub or murmur noted. Pulmonary/Chest: Effort & breath sounds normal. No stridor, respiratory distress, wheezes or rales noted. Abdominal: Soft. Bowel sounds present in all 4 quads with abdomen is taunt from edema. No mass, tenderness, guarding or rebound tenderness noted. Musculoskeletal: Normal ROM with RBAK with +1 pitting edema no tenderness. Neurological: Alert and oriented to person, place, and time. No cranial nerve deficit. Skin: Skin is warm and dry. No rash, diaphoresis, erythema, or pallor noted. appearance: alert and cooperative with exam  HEENT: atraumatic, eyes with clear conjunctiva and normal lids, pupils and irises normal, external ears and nose are normal, lips normal.  Neck without masses, lympadenopathy, bruit, thyroid normal  Lungs: no increased work of breathing, clear to auscultation bilaterally without rales, rhonchi or wheezes  Heart: regular rate and rhythm, S1, S2 normal, no murmur, click, rub or gallop  Abdomen: soft, non-tender; bowel sounds normal; no masses,  no organomegaly  Extremities: extremities normal, atraumatic, no cyanosis or edema  Neurologic: No focal neurologic deficits, normal sensation, alert and oriented, affect and mood appropriate. Skin: no rashes, nodules. A/P: Elevation, compression, diuresis, monitor BMP, continue supportive care.

## 2017-12-16 NOTE — PROGRESS NOTES
129/65   Pulse 70   Temp 98.2 °F (36.8 °C) (Temporal)   Resp 18   Ht 5' 11\" (1.803 m)   Wt 205 lb 14.4 oz (93.4 kg)   SpO2 93%   BMI 28.72 kg/m²     Constitutional: he appears well-developed and well-nourished. Eyes  conjunctiva normal.  Pupils react to light  Ear, nose, throat -hearing intact to finger rub No scars, masses, or lesions over external nose or ears, no atrophy of tongue  Neck-symmetric, no masses noted, no jugular vein distension  Respiration- chest wall appears symmetric, good expansion,   normal effort without use of accessory muscles  Musculoskeletal  no significant wasting of muscles noted, no bony deformities, gait no gross ataxia  Extremities-no clubbing, cyanosis. Mild edema left ankle. Skin  warm, dry, and intact. No rash, erythema, or pallor. Psychiatric  mood, affect, and behavior appear normal.      Neurology  NEUROLOGICAL EXAM:      Mental status   Awake, alert, fluent oriented x 3 appropriate affect  Attention and concentration appear appropriate  Speech normal without dysarthria  No clear issues with language       Cranial Nerves   CN II- Visual fields grossly unremarkable  CN III, IV,VI-EOMI, No nystagmus, conjugate eye movements, no ptosis  CN VII-no facial assymetry  CN VIII-Hearing intact      Motor function  Antigravity x 4  With right below knee amputation        Cerebellar F-N intact     Tremor None present     Gait                  Not tested           Nursing/pcp notes, imaging,labs and vitals reviewed.      PT,OT and/or speech notes reviewed    Lab Results   Component Value Date    WBC 6.3 12/14/2017    HGB 8.0 (L) 12/14/2017    HCT 26.3 (L) 12/14/2017    MCV 90.4 12/14/2017     12/14/2017     Lab Results   Component Value Date     12/14/2017    K 3.8 12/14/2017     12/14/2017    CO2 29 12/14/2017    BUN 19 12/14/2017    CREATININE 0.9 12/14/2017    GLUCOSE 160 (H) 12/14/2017    CALCIUM 7.6 (L) 12/14/2017    PROT 4.6 (L) 12/04/2017    LABALBU 2.5 (L) 12/04/2017    BILITOT 0.3 12/04/2017    ALKPHOS 89 12/04/2017    AST 25 12/04/2017    ALT 13 12/04/2017    LABGLOM >60 12/14/2017     Lab Results   Component Value Date    INR 3.11 (H) 12/16/2017    INR 2.84 (H) 12/15/2017    INR 2.62 (H) 12/14/2017     Lab Results   Component Value Date    CRP 2.96 (H) 11/13/2017       BED MOBILITY  Rolling: Supervision  Supine to Sit: Supervision  Sit to Supine: Supervision  TRANSFERS  Sit to Stand: Contact guard assistance  Bed to Chair: Contact guard assistance     WHEELCHAIR PROPULSION  Level of Assistance: Supervision  Distance: 150  Description/ Details: ABLE OT PERFROM 2 TURNS  AMBULATION  Device: Parallel Bars  Assistance: Minimal assistance  Distance: 5  Quality of Gait: DECREASED STRENGTH RLE WITH UNSTABLE STANCE FOLLOWING SWING PHASE LLE        RECORD REVIEW: Previous medical records, medications were reviewed at today's visit    IMPRESSION:   1. Right below-knee amputation with continued severe painpain control/PT/OT. Has bruising over right hip. No hematoma noted. 2. Diabetes mellitus on sliding scalemonitor. Stable  3. GIbowel regimen  4. History of mechanical heart valve on chronic anticoagulation. Currently on therapeutic dose  Coumadin. (says inr 2-3 pta)  6. History of goutallopurinol. Stable  7. BPHFlomax. Stable  8. Chronic kidney disease stage III. Monitor. Stable  9. Acute blood loss anemia-  S/p 2 units PRBC 12/11, for hgb <7. Now stable  Aldactone resumed, per IM.   Defer management of edema to IM.   dc 12/18, monday

## 2017-12-17 LAB
ANION GAP SERPL CALCULATED.3IONS-SCNC: 8 MMOL/L (ref 7–19)
BUN BLDV-MCNC: 15 MG/DL (ref 8–23)
CALCIUM SERPL-MCNC: 8.1 MG/DL (ref 8.8–10.2)
CHLORIDE BLD-SCNC: 96 MMOL/L (ref 98–111)
CO2: 35 MMOL/L (ref 22–29)
CREAT SERPL-MCNC: 0.8 MG/DL (ref 0.5–1.2)
GFR NON-AFRICAN AMERICAN: >60
GLUCOSE BLD-MCNC: 125 MG/DL (ref 70–99)
GLUCOSE BLD-MCNC: 143 MG/DL (ref 70–99)
GLUCOSE BLD-MCNC: 160 MG/DL (ref 74–109)
GLUCOSE BLD-MCNC: 225 MG/DL (ref 70–99)
HCT VFR BLD CALC: 26.6 % (ref 42–52)
HEMOGLOBIN: 8 G/DL (ref 14–18)
INR BLD: 3.4 (ref 0.88–1.18)
MCH RBC QN AUTO: 27.4 PG (ref 27–31)
MCHC RBC AUTO-ENTMCNC: 30.1 G/DL (ref 33–37)
MCV RBC AUTO: 91.1 FL (ref 80–94)
PDW BLD-RTO: 16.4 % (ref 11.5–14.5)
PERFORMED ON: ABNORMAL
PLATELET # BLD: 212 K/UL (ref 130–400)
PMV BLD AUTO: 10 FL (ref 9.4–12.4)
POTASSIUM SERPL-SCNC: 4.1 MMOL/L (ref 3.5–5)
PROTHROMBIN TIME: 34.9 SEC (ref 12–14.6)
RBC # BLD: 2.92 M/UL (ref 4.7–6.1)
SODIUM BLD-SCNC: 139 MMOL/L (ref 136–145)
WBC # BLD: 4.7 K/UL (ref 4.8–10.8)

## 2017-12-17 PROCEDURE — 6370000000 HC RX 637 (ALT 250 FOR IP): Performed by: PHYSICIAN ASSISTANT

## 2017-12-17 PROCEDURE — 6370000000 HC RX 637 (ALT 250 FOR IP): Performed by: SURGERY

## 2017-12-17 PROCEDURE — 2580000003 HC RX 258: Performed by: PSYCHIATRY & NEUROLOGY

## 2017-12-17 PROCEDURE — 85610 PROTHROMBIN TIME: CPT

## 2017-12-17 PROCEDURE — 85027 COMPLETE CBC AUTOMATED: CPT

## 2017-12-17 PROCEDURE — 80048 BASIC METABOLIC PNL TOTAL CA: CPT

## 2017-12-17 PROCEDURE — 2700000000 HC OXYGEN THERAPY PER DAY

## 2017-12-17 PROCEDURE — 82948 REAGENT STRIP/BLOOD GLUCOSE: CPT

## 2017-12-17 PROCEDURE — 1180000000 HC REHAB R&B

## 2017-12-17 PROCEDURE — 6370000000 HC RX 637 (ALT 250 FOR IP): Performed by: NURSE PRACTITIONER

## 2017-12-17 PROCEDURE — 6370000000 HC RX 637 (ALT 250 FOR IP): Performed by: PSYCHIATRY & NEUROLOGY

## 2017-12-17 PROCEDURE — 6370000000 HC RX 637 (ALT 250 FOR IP): Performed by: FAMILY MEDICINE

## 2017-12-17 RX ADMIN — OXYCODONE HYDROCHLORIDE AND ACETAMINOPHEN 1 TABLET: 10; 325 TABLET ORAL at 10:24

## 2017-12-17 RX ADMIN — PANTOPRAZOLE SODIUM 40 MG: 40 TABLET, DELAYED RELEASE ORAL at 06:01

## 2017-12-17 RX ADMIN — ALLOPURINOL 100 MG: 100 TABLET ORAL at 08:23

## 2017-12-17 RX ADMIN — METOLAZONE 2.5 MG: 2.5 TABLET ORAL at 08:24

## 2017-12-17 RX ADMIN — WARFARIN SODIUM 7.5 MG: 2.5 TABLET ORAL at 17:30

## 2017-12-17 RX ADMIN — FLUOXETINE HYDROCHLORIDE 20 MG: 20 CAPSULE ORAL at 22:17

## 2017-12-17 RX ADMIN — SPIRONOLACTONE 50 MG: 25 TABLET, FILM COATED ORAL at 08:24

## 2017-12-17 RX ADMIN — GUAIFENESIN 1200 MG: 600 TABLET, EXTENDED RELEASE ORAL at 22:18

## 2017-12-17 RX ADMIN — Medication 10 ML: at 08:32

## 2017-12-17 RX ADMIN — TAMSULOSIN HYDROCHLORIDE 0.4 MG: 0.4 CAPSULE ORAL at 08:23

## 2017-12-17 RX ADMIN — LEVOTHYROXINE SODIUM 75 MCG: 50 TABLET ORAL at 06:01

## 2017-12-17 RX ADMIN — GABAPENTIN 400 MG: 400 CAPSULE ORAL at 08:23

## 2017-12-17 RX ADMIN — ALLOPURINOL 100 MG: 100 TABLET ORAL at 22:18

## 2017-12-17 RX ADMIN — TIOTROPIUM BROMIDE 18 MCG: 18 CAPSULE ORAL; RESPIRATORY (INHALATION) at 08:22

## 2017-12-17 RX ADMIN — GABAPENTIN 600 MG: 300 CAPSULE ORAL at 17:30

## 2017-12-17 RX ADMIN — CEPHALEXIN 500 MG: 500 CAPSULE ORAL at 12:36

## 2017-12-17 RX ADMIN — ATORVASTATIN CALCIUM 40 MG: 40 TABLET, FILM COATED ORAL at 22:19

## 2017-12-17 RX ADMIN — OXYCODONE HYDROCHLORIDE AND ACETAMINOPHEN 1 TABLET: 10; 325 TABLET ORAL at 02:37

## 2017-12-17 RX ADMIN — GABAPENTIN 400 MG: 400 CAPSULE ORAL at 12:34

## 2017-12-17 RX ADMIN — OXYCODONE HYDROCHLORIDE AND ACETAMINOPHEN 1 TABLET: 10; 325 TABLET ORAL at 22:23

## 2017-12-17 RX ADMIN — CEPHALEXIN 500 MG: 500 CAPSULE ORAL at 22:18

## 2017-12-17 RX ADMIN — ASPIRIN 81 MG CHEWABLE TABLET 81 MG: 81 TABLET CHEWABLE at 08:23

## 2017-12-17 RX ADMIN — TRAZODONE HYDROCHLORIDE 100 MG: 50 TABLET, FILM COATED ORAL at 22:17

## 2017-12-17 RX ADMIN — CEPHALEXIN 500 MG: 500 CAPSULE ORAL at 06:01

## 2017-12-17 RX ADMIN — GUAIFENESIN 1200 MG: 600 TABLET, EXTENDED RELEASE ORAL at 08:23

## 2017-12-17 RX ADMIN — LATANOPROST 1 DROP: 50 SOLUTION OPHTHALMIC at 22:19

## 2017-12-17 RX ADMIN — METOPROLOL SUCCINATE 25 MG: 50 TABLET, FILM COATED, EXTENDED RELEASE ORAL at 08:24

## 2017-12-17 RX ADMIN — BUMETANIDE 2 MG: 1 TABLET ORAL at 08:23

## 2017-12-17 ASSESSMENT — PAIN SCALES - GENERAL
PAINLEVEL_OUTOF10: 0
PAINLEVEL_OUTOF10: 5
PAINLEVEL_OUTOF10: 1
PAINLEVEL_OUTOF10: 7
PAINLEVEL_OUTOF10: 5

## 2017-12-18 VITALS
RESPIRATION RATE: 16 BRPM | WEIGHT: 204.4 LBS | HEIGHT: 71 IN | HEART RATE: 84 BPM | BODY MASS INDEX: 28.61 KG/M2 | TEMPERATURE: 98.3 F | OXYGEN SATURATION: 95 % | SYSTOLIC BLOOD PRESSURE: 117 MMHG | DIASTOLIC BLOOD PRESSURE: 54 MMHG

## 2017-12-18 LAB
ANION GAP SERPL CALCULATED.3IONS-SCNC: 7 MMOL/L (ref 7–19)
BASOPHILS ABSOLUTE: 0.1 K/UL (ref 0–0.2)
BASOPHILS RELATIVE PERCENT: 1.3 % (ref 0–1)
BUN BLDV-MCNC: 13 MG/DL (ref 8–23)
CALCIUM SERPL-MCNC: 8.1 MG/DL (ref 8.8–10.2)
CHLORIDE BLD-SCNC: 94 MMOL/L (ref 98–111)
CO2: 38 MMOL/L (ref 22–29)
CREAT SERPL-MCNC: 0.8 MG/DL (ref 0.5–1.2)
EOSINOPHILS ABSOLUTE: 0.3 K/UL (ref 0–0.6)
EOSINOPHILS RELATIVE PERCENT: 6.8 % (ref 0–5)
GFR NON-AFRICAN AMERICAN: >60
GLUCOSE BLD-MCNC: 251 MG/DL (ref 74–109)
GLUCOSE BLD-MCNC: 259 MG/DL (ref 70–99)
GLUCOSE BLD-MCNC: 267 MG/DL (ref 70–99)
HCT VFR BLD CALC: 26.9 % (ref 42–52)
HEMOGLOBIN: 8.1 G/DL (ref 14–18)
INR BLD: 3.27 (ref 0.88–1.18)
LYMPHOCYTES ABSOLUTE: 0.5 K/UL (ref 1.1–4.5)
LYMPHOCYTES RELATIVE PERCENT: 13 % (ref 20–40)
MCH RBC QN AUTO: 27.4 PG (ref 27–31)
MCHC RBC AUTO-ENTMCNC: 30.1 G/DL (ref 33–37)
MCV RBC AUTO: 90.9 FL (ref 80–94)
MONOCYTES ABSOLUTE: 0.5 K/UL (ref 0–0.9)
MONOCYTES RELATIVE PERCENT: 12.8 % (ref 0–10)
NEUTROPHILS ABSOLUTE: 2.6 K/UL (ref 1.5–7.5)
NEUTROPHILS RELATIVE PERCENT: 65.8 % (ref 50–65)
PDW BLD-RTO: 16.1 % (ref 11.5–14.5)
PERFORMED ON: ABNORMAL
PERFORMED ON: ABNORMAL
PLATELET # BLD: 214 K/UL (ref 130–400)
PMV BLD AUTO: 9.7 FL (ref 9.4–12.4)
POTASSIUM SERPL-SCNC: 4 MMOL/L (ref 3.5–5)
PROTHROMBIN TIME: 33.8 SEC (ref 12–14.6)
RBC # BLD: 2.96 M/UL (ref 4.7–6.1)
SODIUM BLD-SCNC: 139 MMOL/L (ref 136–145)
WBC # BLD: 4 K/UL (ref 4.8–10.8)

## 2017-12-18 PROCEDURE — 6370000000 HC RX 637 (ALT 250 FOR IP): Performed by: PSYCHIATRY & NEUROLOGY

## 2017-12-18 PROCEDURE — 6370000000 HC RX 637 (ALT 250 FOR IP): Performed by: PHYSICIAN ASSISTANT

## 2017-12-18 PROCEDURE — 85610 PROTHROMBIN TIME: CPT

## 2017-12-18 PROCEDURE — 6370000000 HC RX 637 (ALT 250 FOR IP): Performed by: NURSE PRACTITIONER

## 2017-12-18 PROCEDURE — 85025 COMPLETE CBC W/AUTO DIFF WBC: CPT

## 2017-12-18 PROCEDURE — 80048 BASIC METABOLIC PNL TOTAL CA: CPT

## 2017-12-18 PROCEDURE — 99221 1ST HOSP IP/OBS SF/LOW 40: CPT | Performed by: NURSE PRACTITIONER

## 2017-12-18 PROCEDURE — 2700000000 HC OXYGEN THERAPY PER DAY

## 2017-12-18 PROCEDURE — 6370000000 HC RX 637 (ALT 250 FOR IP): Performed by: SURGERY

## 2017-12-18 PROCEDURE — 2580000003 HC RX 258: Performed by: PSYCHIATRY & NEUROLOGY

## 2017-12-18 PROCEDURE — 82948 REAGENT STRIP/BLOOD GLUCOSE: CPT

## 2017-12-18 PROCEDURE — 99239 HOSP IP/OBS DSCHRG MGMT >30: CPT | Performed by: PSYCHIATRY & NEUROLOGY

## 2017-12-18 PROCEDURE — 6370000000 HC RX 637 (ALT 250 FOR IP): Performed by: FAMILY MEDICINE

## 2017-12-18 RX ORDER — OXYCODONE AND ACETAMINOPHEN 10; 325 MG/1; MG/1
1 TABLET ORAL EVERY 4 HOURS PRN
Qty: 120 TABLET | Refills: 0 | Status: SHIPPED | OUTPATIENT
Start: 2017-12-18 | End: 2017-12-25

## 2017-12-18 RX ORDER — BUMETANIDE 2 MG/1
2 TABLET ORAL DAILY
Qty: 30 TABLET | Refills: 3 | Status: SHIPPED | OUTPATIENT
Start: 2017-12-18 | End: 2019-06-06 | Stop reason: SDUPTHER

## 2017-12-18 RX ORDER — SPIRONOLACTONE 50 MG/1
50 TABLET, FILM COATED ORAL DAILY
Qty: 30 TABLET | Refills: 3 | Status: SHIPPED | OUTPATIENT
Start: 2017-12-18

## 2017-12-18 RX ORDER — WARFARIN SODIUM 7.5 MG/1
TABLET ORAL
Qty: 30 TABLET | Refills: 0 | Status: SHIPPED | OUTPATIENT
Start: 2017-12-18

## 2017-12-18 RX ORDER — GABAPENTIN 400 MG/1
400 CAPSULE ORAL 2 TIMES DAILY
Qty: 90 CAPSULE | Refills: 3 | Status: SHIPPED | OUTPATIENT
Start: 2017-12-18 | End: 2018-02-28 | Stop reason: DRUGHIGH

## 2017-12-18 RX ORDER — CEPHALEXIN 500 MG/1
500 CAPSULE ORAL EVERY 8 HOURS SCHEDULED
Qty: 9 CAPSULE | Refills: 0 | Status: SHIPPED | OUTPATIENT
Start: 2017-12-18 | End: 2017-12-27 | Stop reason: ALTCHOICE

## 2017-12-18 RX ORDER — GABAPENTIN 300 MG/1
600 CAPSULE ORAL EVERY EVENING
Qty: 90 CAPSULE | Refills: 3 | Status: SHIPPED | OUTPATIENT
Start: 2017-12-18 | End: 2019-06-06 | Stop reason: DRUGHIGH

## 2017-12-18 RX ORDER — TRAMADOL HYDROCHLORIDE 50 MG/1
50 TABLET ORAL EVERY 6 HOURS PRN
Qty: 40 TABLET | Refills: 0 | Status: SHIPPED | OUTPATIENT
Start: 2017-12-18 | End: 2017-12-28

## 2017-12-18 RX ORDER — METOLAZONE 2.5 MG/1
2.5 TABLET ORAL DAILY
Qty: 30 TABLET | Refills: 3 | Status: ON HOLD | OUTPATIENT
Start: 2017-12-18 | End: 2019-04-12

## 2017-12-18 RX ADMIN — OXYCODONE HYDROCHLORIDE AND ACETAMINOPHEN 1 TABLET: 10; 325 TABLET ORAL at 07:43

## 2017-12-18 RX ADMIN — GABAPENTIN 400 MG: 400 CAPSULE ORAL at 12:10

## 2017-12-18 RX ADMIN — CEPHALEXIN 500 MG: 500 CAPSULE ORAL at 06:03

## 2017-12-18 RX ADMIN — LEVOTHYROXINE SODIUM 75 MCG: 50 TABLET ORAL at 06:03

## 2017-12-18 RX ADMIN — ASPIRIN 81 MG CHEWABLE TABLET 81 MG: 81 TABLET CHEWABLE at 07:42

## 2017-12-18 RX ADMIN — TAMSULOSIN HYDROCHLORIDE 0.4 MG: 0.4 CAPSULE ORAL at 07:43

## 2017-12-18 RX ADMIN — OXYCODONE HYDROCHLORIDE AND ACETAMINOPHEN 1 TABLET: 10; 325 TABLET ORAL at 03:25

## 2017-12-18 RX ADMIN — BUMETANIDE 2 MG: 1 TABLET ORAL at 07:42

## 2017-12-18 RX ADMIN — TIOTROPIUM BROMIDE 18 MCG: 18 CAPSULE ORAL; RESPIRATORY (INHALATION) at 07:42

## 2017-12-18 RX ADMIN — GUAIFENESIN 1200 MG: 600 TABLET, EXTENDED RELEASE ORAL at 07:43

## 2017-12-18 RX ADMIN — METOLAZONE 2.5 MG: 2.5 TABLET ORAL at 07:43

## 2017-12-18 RX ADMIN — METOPROLOL SUCCINATE 25 MG: 50 TABLET, FILM COATED, EXTENDED RELEASE ORAL at 07:42

## 2017-12-18 RX ADMIN — CEPHALEXIN 500 MG: 500 CAPSULE ORAL at 14:05

## 2017-12-18 RX ADMIN — ALLOPURINOL 100 MG: 100 TABLET ORAL at 07:43

## 2017-12-18 RX ADMIN — Medication 10 ML: at 07:48

## 2017-12-18 RX ADMIN — GABAPENTIN 400 MG: 400 CAPSULE ORAL at 07:44

## 2017-12-18 RX ADMIN — PANTOPRAZOLE SODIUM 40 MG: 40 TABLET, DELAYED RELEASE ORAL at 06:03

## 2017-12-18 RX ADMIN — SPIRONOLACTONE 50 MG: 25 TABLET, FILM COATED ORAL at 07:43

## 2017-12-18 ASSESSMENT — PAIN SCALES - GENERAL
PAINLEVEL_OUTOF10: 6
PAINLEVEL_OUTOF10: 6
PAINLEVEL_OUTOF10: 0
PAINLEVEL_OUTOF10: 0

## 2017-12-18 NOTE — PLAN OF CARE
Problem: Falls - Risk of  Goal: Absence of falls  Outcome: Ongoing  Bed alarm set, call light within reach, side rails up times two    Problem: Risk for Impaired Skin Integrity  Goal: Tissue integrity - skin and mucous membranes  Structural intactness and normal physiological function of skin and  mucous membranes. Outcome: Ongoing  Turn and reposition every 2 hours    Problem: Pain:  Goal: Pain level will decrease  Pain level will decrease   Outcome: Ongoing  Patient encouraged to ask for pain med as needed to control acute pain  Goal: Control of acute pain  Control of acute pain   Outcome: Ongoing  Patient encouraged to ask for pain med as needed to control acute pain    Problem: Infection - Risk of, Central Venous Catheter-Associated Bloodstream Infection  Goal: Absence of central venous catheter-associated bloodstream infection  Outcome: Ongoing  No complications or infection noted on this shift. Vital signs stable.

## 2017-12-18 NOTE — PATIENT CARE CONFERENCE
[facility] ACUTE INPATIENT REHABILITATION  TEAM CONFERENCE NOTE    Date: 2017  Patient Name: Tita You        MRN: [id number]    : 1950  (69 y.o.)  Gender: male      Diagnosis: S/P RIGHT BELOW KNEE AMPUTATION      PHYSICAL THERAPY      SPEECH THERAPY      OCCUPATIONAL THERAPY    OT FIM PROGRESS   ADMIT SCORE CURRENT SCORE GOAL   EATING Eatin - Patient feeds self Eatin - Patient feeds self GOAL: Eatin   GROOMING Groomin - Requires setup/cues to do all tasks Groomin - Patient independent with all grooming tasks GOAL: Groomin   BATHING Bathin - Able to bathe all 10 areas with setup/sup/cues (Sponge bath in bed, kaela and buttocks hygiene in supine ) Bathin - Able to bathe all 10 areas with setup/sup/cues GOAL: Bathin   UPPER EXTREMITY DRESSING Dressing-Upper: 5 - Requires setup/supervision/cues and/or requires assist with presthesis/brace only Dressing-Upper: 7 - Patient independently dresses upper body GOAL: Dressing-Upper: 7   LOWER EXTREMITY DRESSING Dressing-Lower: 3 - Requires assist with 2-3 parts of dressing Dressing-Lower: 4 - Requires assist with buttons/zippers/shoelaces and/or assist with shoes only GOAL: Dressing-Lower: 6   TOILETING Toileting: 3 - Able to perform 2 tasks Toiletin - Requires steadying assistance only GOAL: Toiletin   TOILET TRANSFER Toilet Transfer: 4 - Requires steadying assistance only < 25% assist (Stand pivot to standard toilet ) Toilet Transfer: 5 - Requires setup/supervision/cues GOAL: Toilet: 6   TUB/SHOWER TRANSFER Primary Mode: Shower     Shower Transfer: 0 - Activity does not occur   Primary Mode: Shower     Shower Transfer: 0 - Activity does not occur     Primary Mode: Shower  GOAL: Tub, Shower: 6         Cognition:  Comprehension: 4 - Patient understands basic needs 75-90%+ of the time  Expression: 5 - Expresses basic ideas/needs only (hungry/hot/pain)  Social Interaction: 5 - Patient is appropriate with supervision/cues  Problem Solvin - Patient able to solve simple/routine tasks  Memory: 4 - Patient remembers 75-90%+ of the time    UE Functioning:  ***    Pain Assessment:  Pain Level: 2  Pain Location: Leg    STGs:  Short term goals  Time Frame for Short term goals: 1 week   Short term goal 1: MET  Short term goal 2: MET  Short term goal 3: MET  Short term goal 4: MET  Short term goal 5: MET    LTGs:  Long term goals  Time Frame for Long term goals : 2 weeks   Long term goal 1: Modified independent with overall dressing. Long term goal 2: Modified independent with overall toileting. Long term goal 3: Modified independent with bathing hygiene. Long term goal 4: MET  Long term goal 5: MET    Assessment:  ***    ELOS:  ***            NUTRITION  Current Wt: Weight: 204 lb 6.4 oz (92.7 kg) / Body mass index is 28.51 kg/m². Admission Wt: Admission Body Weight:  (none recorded)  Oral Diet Orders: Carb Control 4 Carbs/Meal, No Added Salt (3-4gm), Fluid Restriction   Oral Nutrition Supplement (ONS) Orders: None  Please see nutrition note for details. NURSING    FIMS:  Bladder  Level of Assistance: Bladder Level of Assistance: 3- Moderate Assistance (Subject equal 50% or more)  Frequency of Accidents: Bladder Frequency of Accidents: 4 - Two accidents    Bowel  Level of Assistance: Bowel Level of Assistance: 0- Activity does not occur (Use this code only at admission)  Frequency of Accidents:  Bowel Frequency of Accidents: 4 - Two accidents    Wounds/Incisions/Ulcers: {Blank single:::\"No skin issues identified\",\"Incision healing well\",\"Incision concerning for ***\",\"Wounds present ***\"}     Cristino Scale Score: 16    Pain: {Blank single:::\"No pain concerns to address\",\"Patient's pain is currently controlled with ***\",\"Patient's pain currently uncontrolled and adjustments will be made as follows: ***\"}    Consultations/Labs/X-rays:     Family Education: {Blank single:::\"Family available and

## 2017-12-18 NOTE — PROGRESS NOTES
Dejan Cook,  talked with patient regarding prescriptions and wife states she has said prescriptions and refused the following prescriptions:  Neurontin 400 mg, Neurontin 600 mg, Aldactone 50 mg, Zaroxolyn 2.5 mg, Bumex 2mg, Ultram 50 mg, Coumadin 7.5 mg, and Spiriva 18 mcg. The following prescriptions were shreared.

## 2017-12-18 NOTE — DISCHARGE SUMMARY
Neurology Discharge Summary     Patient Identification:  Saray Ash is a 69 y.o. male. :  1950  Admit Date:  2017  Discharge date : 17  Attending Provider: Sharonda Traore MD     Account Number: [de-identified]                                   Admission Diagnoses:   S/P BKA (below knee amputation) bilateral ([facility] [facility] [W06.329, Z89.511]  S/P BKA (below knee amputation) bilateral ([facility] [Z86.961, Z89.511]    Discharge Diagnoses:  Principal Problem:    S/P BKA (below knee amputation), right ([facility] [facility]  Active Problems:    History of heart valve replacement with mechanical valve    Type 2 diabetes mellitus with foot ulcer, with long-term current use of insulin (HCC)    Essential hypertension    Mixed hyperlipidemia    Glaucoma    Stage 3 chronic kidney disease    Cirrhosis of liver (HCC)    Acute blood loss as cause of postoperative anemia    Cir[facility] ascites ([facility]      Discharge Medications:    Current Discharge Medication List           Details   oxyCODONE-acetaminophen (PERCOCET)  MG per tablet Take 1 tablet by mouth every 4 hours as needed for Pain . Qty: 120 tablet, Refills: 0      cephALEXin (KEFLEX) 500 MG capsule Take 1 capsule by mouth every 8 hours  Qty: 9 capsule, Refills: 0              Details   traMADol (ULTRAM) 50 MG tablet Take 1 tablet by mouth every 6 hours as needed for Pain .   Qty: 40 tablet, Refills: 0      tiotropium (SPIRIVA) 18 MCG inhalation capsule Inhale 1 capsule into the lungs daily  Qty: 30 capsule, Refills: 3      warfarin (COUMADIN) 7.5 MG tablet Take one each pm  Qty: 30 tablet, Refills: 0      !! gabapentin (NEURONTIN) 300 MG capsule Take 2 capsules by mouth every evening  Qty: 90 capsule, Refills: 3      !! gabapentin (NEURONTIN) 400 MG capsule Take 1 capsule by mouth 2 times daily  Qty: 90 capsule, Refills: 3      bumetanide (BUMEX) 2 MG tablet Take 1 tablet by mouth daily  Qty: 30 tablet, Refills: 3      metolazone (ZAROXOLYN) 2.5 MG tablet Take 1 tablet by mouth daily  Qty: 30 tablet, Refills: 3      spironolactone (ALDACTONE) 50 MG tablet Take 1 tablet by mouth daily  Qty: 30 tablet, Refills: 3       !! - Potential duplicate medications found. Please discuss with provider.            Details   OXYGEN Inhale 2 L into the lungs continuous      allopurinol (ZYLOPRIM) 100 MG tablet Take 100 mg by mouth 2 times daily      aspirin 81 MG tablet Take 81 mg by mouth daily      atorvastatin (LIPITOR) 40 MG tablet Take 40 mg by mouth nightly      fluticasone (FLONASE) 50 MCG/ACT nasal spray 2 sprays by Nasal route 2 times daily      FLUoxetine (PROZAC) 20 MG capsule Take 20 mg by mouth nightly      guaiFENesin (MUCINEX) 600 MG extended release tablet Take 1,200 mg by mouth 2 times daily      lactulose 20 GM/30ML SOLN Take by mouth 2 times daily      insulin glargine (LANTUS) 100 UNIT/ML injection vial Inject 42 Units into the skin nightly      latanoprost (XALATAN) 0.005 % ophthalmic solution Place 1 drop into both eyes nightly      levothyroxine (SYNTHROID) 75 MCG tablet Take 75 mcg by mouth Daily      metoprolol succinate (TOPROL XL) 25 MG extended release tablet Take 25 mg by mouth daily      tamsulosin (FLOMAX) 0.4 MG capsule Take 0.4 mg by mouth daily      traZODone (DESYREL) 100 MG tablet Take 100 mg by mouth nightly      Cholecalciferol (VITAMIN D) 2000 units CAPS capsule Take by mouth daily           Current Discharge Medication List      STOP taking these medications       albuterol sulfate  (90 Base) MCG/ACT inhaler Comments:   Reason for Stopping:         clopidogrel (PLAVIX) 75 MG tablet Comments:   Reason for Stopping:         diphenhydrAMINE (BENADRYL) 25 MG capsule Comments:   Reason for Stopping:         hydrALAZINE (APRESOLINE) 10 MG tablet Comments:   Reason for Stopping:         loratadine (CLARITIN) 10 MG capsule Comments:   Reason for Stopping:         melatonin 3 MG TABS tablet Comments:   Reason for Stopping:         olodaterol (STRIVERDI RESPIMAT) 2.5 MCG/ACT inhaler Comments:   Reason for Stopping:         sennosides-docusate sodium (SENOKOT-S) 8.6-50 MG tablet Comments:   Reason for Stopping:         acetaminophen (TYLENOL) 500 MG tablet Comments:   Reason for Stopping:                 Consults:   vascular surgery/ internal medicine    Hospital Course: This 69 y. o. male  with DM type II, who has had a wound on his right leg that started 4 months ago. It had been non healing. On 11/27/17 he had increased swelling,redness and odor around the wound. He contacted wound care center and was made a direct admit to Dr. Je Pozo. He was found to have cellulitus of a right lower extremity open wound with achilles tendon visualized. He was placed on IV Vancomycin and meropenem.  He was taken to OR on 11/28/17 by Dr. Je Pozo for evaluation of his wound. He was found to have a massive infection with necrotic muscle and tendon lower leg. The decison was made for an open below the knee amputation. The wound was left open with planned staged revision in 48-72 hours. He tolerated the procedure well. He returned to surgery on 12/1/17 for revsion on Rt BKA. He tolerated the procedure well. He has had post op anemia and has required transfusions. Will need continued monitoring of his H&H. He had complaints of diarrhea on 12/3/17, C-Diff was negative. CT of abdomen done showed possible colitis. He was started on IV Flagy and diarrhea improved. He has c/o of some dyspnea, repeat chest x-ray done 12/5/17 showed worsening airspace opacities in the lower lobes could be due to pulmonary edema or pneumonia. Pain meds adjusted accordingly. Vascular followed for wound. Pt had transfusion. Anemia stable. He continued on coumadin for artificial heart valve. Diuretics increased for edema, which may have been d/t increased neurontin.       Discharge Instructions     Patient Instructions:   Home [address] orders: PT   Discharge lab work: none  Code status: Full Code   Activity: activity as tolerated  Diet: DIET CARB CONTROL; Carb Control: 4 carbs/meal (approximate 1800 kcals/day); No Added Salt (3-4 GM);  Daily Fluid Restriction: 1800 ml    Wound Care: as directed  Equipment: as per therapy      Sharonda Traore MD    At least 35 minutes were spent in discharging the patient

## 2017-12-18 NOTE — CARE COORDINATION
Mr. Danii Velázquez is services by [address] [place] [address] - Prescription request for pain medication and change of dose in [facility], and request for elevating leg rest for the wheelchair that has been issued by [place] submitted to them on 12/14/17 and follow up today to Nora Hua, Primary Care provider. The [facility] OT called Friday to indicate the elevating leg rest has been ordered. The remainder of the Keflex prescription is provided by [facility].   Orders for Home Health and justification submitted to Nora Hua for referral.

## 2017-12-18 NOTE — PLAN OF CARE
Problem: Falls - Risk of  Goal: Absence of falls  Outcome: Completed Date Met: 12/18/17  One fall during hospital stay with no apparent injuries. Problem: Risk for Impaired Skin Integrity  Goal: Tissue integrity - skin and mucous membranes  Structural intactness and normal physiological function of skin and  mucous membranes. Outcome: Completed Date Met: 12/18/17  Skin remains intact with no skin breakdown noted. Problem: Pain:  Goal: Pain level will decrease  Pain level will decrease   Outcome: Completed Date Met: 12/18/17  Pain medications given as ordered PRN. Problem: Infection - Risk of, Central Venous Catheter-Associated Bloodstream Infection  Goal: Absence of central venous catheter-associated bloodstream infection  Outcome: Completed Date Met: 12/18/17  Left PICC removed prior to dishcarge. No complications noted.

## 2017-12-18 NOTE — DISCHARGE SUMMARY
Right  Assistance Needed: Verbal cues  Physical Assistance Level: No physical assistance  CARE Score: 4     SIT TO SUPINE  Sit to Lying  Assistance Needed: Verbal cues  Physical Assistance Level: No physical assistance  CARE Score: 4     SUPINE TO SIT  Lying to Sitting on Side of Bed  Assistance Needed: Incidental touching  Physical Assistance Level: No physical assistance  CARE Score: 4     SIT TO STAND  Sit to Stand  Assistance Needed: Physical assistance  Physical Assistance Level: 25%-49%  CARE Score: 3     TRANSFERS  Chair/Bed-to-Chair Transfer  Assistance Needed: Physical assistance  Physical Assistance Level: 25%-49%  CARE Score: 3     CAR TRANSFERS  Car Transfer  Assistance Needed: Incidental touching  Physical Assistance Level: No physical assistance  Reason if not Attempted: Safety concerns  CARE Score: 4     WALK 10 FT  Walk 10 Feet  Assistance Needed: Physical assistance  Physical Assistance Level: 25%-49%  CARE Score: 3     WALK 50 FT  Walk 50 Feet with Two Turns  Reason if not Attempted: Safety concerns  CARE Score: 88     WALK 150 FT  Walk 150 Feet  Reason if not Attempted: Safety concerns  CARE Score: 88     WALK 10 FT UNEVEN SURFACES  Walking 10 Feet on Uneven Surfaces  Reason if not Attempted: Safety concerns  CARE Score: 88     1 STEP  1 Step (Curb)  Reason if not Attempted: Safety concerns  CARE Score: 88     4 STEPS  4 Steps  Reason if not Attempted: Safety concerns  CARE Score: 88     12 STEPS  12 Steps  Reason if not Attempted: Safety concerns  CARE Score: 88     PICKING UP OBJECT  Picking Up Object  Reason if not Attempted: Safety concerns  CARE Score: 88     WHEELCHAIR 50 FT  Wheel 50 Feet with Two Turns  Assistance Needed: Verbal cues  Physical Assistance Level: No physical assistance  CARE Score: 4     WHEELCHAIR 150 FT  Wheel 150 Feet  Assistance Needed: Verbal cues  Physical Assistance Level: No physical assistance  Reason if not Attempted: Safety concerns  CARE Score: 4      LAST

## 2017-12-18 NOTE — PROGRESS NOTES
Patient is Alert and Oriented. Incontinent of Bowel and Bladder at times. Right stump incision with dressing dry and intact with Abisai tector in place. Discharge instructions given with information on medications and follow-up appointments. Home Health to follow-up after discharge. Voices understanding.

## 2017-12-27 ENCOUNTER — HOSPITAL ENCOUNTER (OUTPATIENT)
Dept: WOUND CARE | Age: 67
Discharge: HOME | End: 2017-12-27
Payer: MEDICARE

## 2017-12-27 VITALS
HEART RATE: 69 BPM | TEMPERATURE: 98.2 F | SYSTOLIC BLOOD PRESSURE: 118 MMHG | DIASTOLIC BLOOD PRESSURE: 58 MMHG | BODY MASS INDEX: 28.56 KG/M2 | WEIGHT: 204 LBS | RESPIRATION RATE: 16 BRPM | HEIGHT: 71 IN

## 2017-12-27 PROBLEM — E11.628 TYPE 2 DIABETES MELLITUS WITH SKIN COMPLICATION (HCC): Chronic | Status: ACTIVE | Noted: 2017-11-27

## 2017-12-27 PROCEDURE — 99213 OFFICE O/P EST LOW 20 MIN: CPT

## 2017-12-27 PROCEDURE — 99024 POSTOP FOLLOW-UP VISIT: CPT | Performed by: SURGERY

## 2017-12-27 ASSESSMENT — PAIN DESCRIPTION - ORIENTATION: ORIENTATION: RIGHT

## 2017-12-27 ASSESSMENT — PAIN DESCRIPTION - FREQUENCY: FREQUENCY: CONTINUOUS

## 2017-12-27 ASSESSMENT — PAIN SCALES - GENERAL: PAINLEVEL_OUTOF10: 0

## 2017-12-27 ASSESSMENT — PAIN DESCRIPTION - PROGRESSION: CLINICAL_PROGRESSION: NOT CHANGED

## 2017-12-27 ASSESSMENT — PAIN DESCRIPTION - LOCATION: LOCATION: LEG

## 2017-12-27 ASSESSMENT — PAIN DESCRIPTION - PAIN TYPE: TYPE: SURGICAL PAIN

## 2017-12-27 ASSESSMENT — PAIN DESCRIPTION - ONSET: ONSET: ON-GOING

## 2017-12-27 ASSESSMENT — PAIN DESCRIPTION - DESCRIPTORS: DESCRIPTORS: ACHING;BURNING

## 2017-12-27 NOTE — PROGRESS NOTES
gauze roll, and an Ace Wrap daily. Okay for shower if transfers are safe, do not take the [employer]Tech Protector off until sitting in the shower. After shower, pat dry, reapply Abisai Tech protector -then leave shower! Reapply dressings after out of shower. Treatment Orders: Protein rich diet (unless restricted by your physician); Multivitamin daily; Elevate leg when sitting, avoid standing for long periods of time. [facility] followup visit _____2 weeks________________________  (Please note your next appointment above and if you are unable to keep, kindly give a 24 hour notice. Thank you.)          If you experience any of the following, please call the [email or web address] during business hours:    * Increase in Pain  * Temperature over 101  * Increase in drainage from your wound  * Drainage with a foul odor  * Bleeding  * Increase in swelling  * Need for compression bandage changes due to slippage, breakthrough drainage. If you need medical attention outside of the business hours of the [employer] please contact your PCP or go to the nearest emergency room.              Electronically signed by Radha Aguirre MD on 12/27/2017 at 12:26 PM

## 2017-12-27 NOTE — PLAN OF CARE
Problem: Venous:  Goal: Signs of wound healing will improve  Signs of wound healing will improve   Outcome: Ongoing      Problem: Falls - Risk of:  Goal: Will remain free from falls  Will remain free from falls   Outcome: Ongoing

## 2018-01-10 ENCOUNTER — HOSPITAL ENCOUNTER (OUTPATIENT)
Dept: WOUND CARE | Age: 68
Discharge: HOME | End: 2018-01-10
Payer: MEDICARE

## 2018-01-10 VITALS
DIASTOLIC BLOOD PRESSURE: 56 MMHG | WEIGHT: 204 LBS | RESPIRATION RATE: 18 BRPM | SYSTOLIC BLOOD PRESSURE: 97 MMHG | BODY MASS INDEX: 28.56 KG/M2 | HEART RATE: 67 BPM | TEMPERATURE: 98 F | HEIGHT: 71 IN

## 2018-01-10 PROCEDURE — 97597 DBRDMT OPN WND 1ST 20 CM/<: CPT | Performed by: SURGERY

## 2018-01-10 PROCEDURE — 97597 DBRDMT OPN WND 1ST 20 CM/<: CPT

## 2018-01-10 RX ORDER — HYDROCODONE BITARTRATE AND ACETAMINOPHEN 7.5; 325 MG/1; MG/1
1 TABLET ORAL EVERY 4 HOURS PRN
COMMUNITY
End: 2018-02-14 | Stop reason: ALTCHOICE

## 2018-01-10 ASSESSMENT — PAIN DESCRIPTION - ONSET: ONSET: ON-GOING

## 2018-01-10 ASSESSMENT — PAIN DESCRIPTION - FREQUENCY: FREQUENCY: CONTINUOUS

## 2018-01-10 ASSESSMENT — PAIN DESCRIPTION - PROGRESSION: CLINICAL_PROGRESSION: NOT CHANGED

## 2018-01-10 ASSESSMENT — PAIN SCALES - GENERAL: PAINLEVEL_OUTOF10: 7

## 2018-01-10 ASSESSMENT — PAIN DESCRIPTION - LOCATION: LOCATION: LEG

## 2018-01-10 ASSESSMENT — PAIN DESCRIPTION - DESCRIPTORS: DESCRIPTORS: BURNING;ACHING

## 2018-01-10 ASSESSMENT — PAIN DESCRIPTION - ORIENTATION: ORIENTATION: RIGHT

## 2018-01-10 ASSESSMENT — PAIN DESCRIPTION - PAIN TYPE: TYPE: SURGICAL PAIN

## 2018-01-17 ENCOUNTER — HOSPITAL ENCOUNTER (OUTPATIENT)
Dept: WOUND CARE | Age: 68
Discharge: HOME | End: 2018-01-17
Payer: MEDICARE

## 2018-01-17 VITALS
DIASTOLIC BLOOD PRESSURE: 57 MMHG | HEART RATE: 80 BPM | SYSTOLIC BLOOD PRESSURE: 120 MMHG | WEIGHT: 204 LBS | BODY MASS INDEX: 28.56 KG/M2 | TEMPERATURE: 97.5 F | RESPIRATION RATE: 20 BRPM | HEIGHT: 71 IN

## 2018-01-17 PROCEDURE — 97597 DBRDMT OPN WND 1ST 20 CM/<: CPT | Performed by: NURSE PRACTITIONER

## 2018-01-17 PROCEDURE — 97597 DBRDMT OPN WND 1ST 20 CM/<: CPT

## 2018-01-17 NOTE — PROGRESS NOTES
01/29/2015    23 mm mechanical valve, mitral valve annuloplasty repair w/30 mm Physio and tricuspid ring annuloplasty repair w/34 mm MC3 ring    CARDIAC CATHETERIZATION  [facility]11/[facility]  [facility]    EF60%    CARDIAC CATHETERIZATION  12/16/14  [facility]    right heart cath EF 60%    CORONARY ANGIOPLASTY WITH STENT PLACEMENT  12/18/14  [facility]    to RCA    LEG AMPUTATION BELOW KNEE Right 11/28/2017     RIGHT OPEN BELOW KNEE AMPUTATION performed by Brenda Angeles MD at [facility] OR       FAMILY HISTORY    Family History   Problem Relation Age of Onset    Parkinsonism Mother     Heart Disease Father     COPD Father     COPD Sister     Heart Disease Brother        SOCIAL HISTORY    Social History   Substance Use Topics    Smoking status: Never Smoker    Smokeless tobacco: Never Used    Alcohol use No       ALLERGIES    Allergies   Allergen Reactions    Prazosin        MEDICATIONS    Current Outpatient Prescriptions on File Prior to Encounter   Medication Sig Dispense Refill    HYDROcodone-acetaminophen (NORCO) 7.5-325 MG per tablet Take 1 tablet by mouth every 4 hours as needed for Pain.  tiotropium (SPIRIVA) 18 MCG inhalation capsule Inhale 1 capsule into the lungs daily 30 capsule 3    warfarin (COUMADIN) 7.5 MG tablet Take one each pm (Patient taking differently: 5 mg Take one each pm; 5 mg tablets on Sunday, Wednesday and Friday.   7.5 mg tablets on Monday, Tuesday, Thursday, and Saturday.) 30 tablet 0    gabapentin (NEURONTIN) 300 MG capsule Take 2 capsules by mouth every evening 90 capsule 3    gabapentin (NEURONTIN) 400 MG capsule Take 1 capsule by mouth 2 times daily 90 capsule 3    bumetanide (BUMEX) 2 MG tablet Take 1 tablet by mouth daily 30 tablet 3    metolazone (ZAROXOLYN) 2.5 MG tablet Take 1 tablet by mouth daily 30 tablet 3    spironolactone (ALDACTONE) 50 MG tablet Take 1 tablet by mouth daily 30 tablet 3    OXYGEN Inhale 2 L into the lungs continuous      allopurinol (ZYLOPRIM) 100 MG tablet Take 100 supple and non-tender without mass, no thyromegaly or thyroid nodules, no cervical lymphadenopathy  Pulmonary/Chest: clear to auscultation bilaterally- no wheezes, rales or rhonchi, normal air movement, no respiratory distress  Cardiovascular: normal rate, regular rhythm, normal S1 and S2, no murmurs, rubs, clicks, or gallops, distal pulses intact, no carotid bruits  Abdomen: soft, non-tender, non-distended, normal bowel sounds, no masses or organomegaly  Extremities: no cyanosis, clubbing or edema  Musculoskeletal: normal range of motion, no joint swelling, deformity or tenderness  Neurologic: reflexes normal and symmetric, no cranial nerve deficit, gait, coordination and speech normal      Assessment:      Patient Active Problem List   Diagnosis Code    History of heart valve replacement with mechanical valve Z95.2    Type 2 diabetes mellitus with skin complication (HCC) H62.236    Acquired hypothyroidism E03.9    Essential hypertension I10    Mixed hyperlipidemia E78.2    Glaucoma H40.9    Panlobular emphysema ([address] J43.1    Stage 3 chronic kidney disease N18.3    Cirrhosis of liver (HCC) K74.60    Acute blood loss as cause of postoperative anemia D62    S/P BKA (below knee amputation), right (HCC) Z89.511    Cirrhosis of liver with ascites (HCC) K74.60        Procedure Note  Indications:  Based on my examination of this patient's wound(s)/ulcer(s) today, debridement is required to promote healing and evaluate the wound base. Performed by: ALEXANDRA Allen    Consent obtained:  Yes    Time out taken:  Yes    Pain Control: Anesthetic  Anesthetic: 2% Lidocaine Gel Topical       Debridement:Non-excisional Debridement    Using curette the wound(s)/ulcer(s) was/were sharply debrided down through and including the removal of epidermis and dermis.         Devitalized Tissue Debrided:  fibrin, biofilm and slough    Pre Debridement Measurements:  Are located in the [place]  Documentation Flow

## 2018-01-17 NOTE — PLAN OF CARE
Problem: Wound:  Goal: Will show signs of wound healing; wound closure and no evidence of infection  Will show signs of wound healing; wound closure and no evidence of infection  Outcome: Ongoing      Problem: Venous:  Goal: Signs of wound healing will improve  Signs of wound healing will improve   Outcome: Ongoing      Problem: Falls - Risk of:  Goal: Will remain free from falls  Will remain free from falls   Outcome: Ongoing

## 2018-01-24 ENCOUNTER — HOSPITAL ENCOUNTER (OUTPATIENT)
Dept: WOUND CARE | Age: 68
Discharge: HOME | End: 2018-01-24
Payer: MEDICARE

## 2018-01-24 VITALS
HEART RATE: 72 BPM | DIASTOLIC BLOOD PRESSURE: 54 MMHG | RESPIRATION RATE: 22 BRPM | HEIGHT: 71 IN | BODY MASS INDEX: 28.56 KG/M2 | SYSTOLIC BLOOD PRESSURE: 120 MMHG | WEIGHT: 204 LBS | TEMPERATURE: 99.4 F

## 2018-01-24 PROCEDURE — 97597 DBRDMT OPN WND 1ST 20 CM/<: CPT

## 2018-01-24 PROCEDURE — 97597 DBRDMT OPN WND 1ST 20 CM/<: CPT | Performed by: NURSE PRACTITIONER

## 2018-01-24 ASSESSMENT — PAIN SCALES - GENERAL: PAINLEVEL_OUTOF10: 3

## 2018-01-24 ASSESSMENT — PAIN DESCRIPTION - FREQUENCY: FREQUENCY: INTERMITTENT

## 2018-01-24 ASSESSMENT — PAIN DESCRIPTION - LOCATION: LOCATION: ABDOMEN

## 2018-01-24 ASSESSMENT — PAIN DESCRIPTION - DESCRIPTORS: DESCRIPTORS: OTHER (COMMENT)

## 2018-01-24 ASSESSMENT — PAIN DESCRIPTION - ORIENTATION: ORIENTATION: RIGHT

## 2018-01-24 ASSESSMENT — PAIN DESCRIPTION - PROGRESSION: CLINICAL_PROGRESSION: NOT CHANGED

## 2018-01-24 ASSESSMENT — PAIN DESCRIPTION - ONSET: ONSET: ON-GOING

## 2018-01-24 NOTE — PROGRESS NOTES
daily      aspirin 81 MG tablet Take 81 mg by mouth daily On hold      atorvastatin (LIPITOR) 40 MG tablet Take 40 mg by mouth nightly      fluticasone (FLONASE) 50 MCG/ACT nasal spray 2 sprays by Nasal route 2 times daily      FLUoxetine (PROZAC) 20 MG capsule Take 20 mg by mouth nightly      guaiFENesin (MUCINEX) 600 MG extended release tablet Take 1,200 mg by mouth 2 times daily      lactulose 20 GM/30ML SOLN Take by mouth 2 times daily      insulin glargine (LANTUS) 100 UNIT/ML injection vial Inject 42 Units into the skin nightly      latanoprost (XALATAN) 0.005 % ophthalmic solution Place 1 drop into both eyes nightly      levothyroxine (SYNTHROID) 75 MCG tablet Take 75 mcg by mouth Daily      metoprolol succinate (TOPROL XL) 25 MG extended release tablet Take 25 mg by mouth daily      tamsulosin (FLOMAX) 0.4 MG capsule Take 0.4 mg by mouth daily      traZODone (DESYREL) 100 MG tablet Take 100 mg by mouth nightly      Cholecalciferol (VITAMIN D) 2000 units CAPS capsule Take by mouth daily       No current facility-administered medications on file prior to encounter. REVIEW OF SYSTEMS    Pertinent items are noted in HPI.     Objective:      BP (!) 120/54   Pulse 72   Temp 99.4 °F (37.4 °C) (Temporal)   Resp 22   Ht 5' 11\" (1.803 m)   Wt 204 lb (92.5 kg)   BMI 28.45 kg/m²     Wt Readings from Last 3 Encounters:   01/24/18 204 lb (92.5 kg)   01/17/18 204 lb (92.5 kg)   01/10/18 204 lb (92.5 kg)       PHYSICAL EXAM    General Appearance: alert and oriented to person, place and time, well developed and well- nourished, in no acute distress  Skin: warm and dry, no rash or erythema  Head: normocephalic and atraumatic  Eyes: pupils equal, round, and reactive to light, extraocular eye movements intact, conjunctivae normal  ENT: tympanic membrane, external ear and ear canal normal bilaterally, nose without deformity, nasal mucosa and turbinates normal without polyps  Neck: supple and non-tender #: 2 and 3    Post Debridement Measurements:  Wound/Ulcer Descriptions are Pre Debridement except measurements:      Percent of Wound(s)/Ulcer(s) Debrided: 100%    Total Surface Area Debrided:  6.45 sq cm         Wound 01/10/18 Surgical dehiscence Leg Right;Lateral Wound 2, Surgical dehiscence, R. Lateral BKA (mislabeled as #4) (Active)   Wound Image   1/24/2018 12:06 PM   Wound Type Wound 1/24/2018 12:06 PM   Wound Other 1/17/2018  1:40 PM   Dressing Status Old drainage 1/24/2018 12:06 PM   Dressing Changed Changed/New 1/17/2018  3:20 PM   Dressing/Treatment 4x4; Ace Wrap 1/17/2018  3:20 PM   Wound Cleansed Rinsed/Irrigated with saline 1/24/2018 12:06 PM   Wound Length (cm) 0.5 cm 1/24/2018 12:06 PM   Wound Width (cm) 1.9 cm 1/24/2018 12:06 PM   Wound Depth (cm)  2. 1/24/2018 12:06 PM   Calculated Wound Size (cm^2) (l*w) 0.95 cm^2 1/24/2018 12:06 PM   Change in Wound Size % (l*w) 60.42 1/24/2018 12:06 PM   Tunneling Position ___ O'Clock 0 1/24/2018 12:06 PM   Undermining Starts ___ O'Clock 0 1/24/2018 12:06 PM   Undermining Ends___ O'Clock 0 1/24/2018 12:06 PM   Undermining Maxium Distance (cm) 0 1/24/2018 12:06 PM   Wound Assessment Slough;Pink;Drainage;Yellow 1/24/2018 12:06 PM   Drainage Amount Moderate 1/24/2018 12:06 PM   Drainage Description Serosanguinous 1/24/2018 12:06 PM   Odor None 1/24/2018 12:06 PM   Margins Attached edges 1/24/2018 12:06 PM   Exposed structure Fascia 1/24/2018 12:06 PM   Janel-wound Assessment Painful;Purple 1/24/2018 12:06 PM   Non-staged Wound Description Full thickness 1/24/2018 12:06 PM   [place]%Wound Bed 10 1/24/2018 12:06 PM   Red%Wound Bed 0 1/24/2018 12:06 PM   Yellow%Wound Bed 90 1/24/2018 12:06 PM   Black%Wound Bed 0 1/24/2018 12:06 PM   Purple%Wound Bed 0 1/24/2018 12:06 PM   Other%Wound Bed 0 1/24/2018 12:06 PM   Culture Taken No 1/17/2018  1:40 PM   Debridement per physician Partial thickness 1/17/2018  2:48 PM   Time out Yes 1/17/2018  2:48 PM   Procedural Pain 5 1/17/2018

## 2018-01-31 ENCOUNTER — HOSPITAL ENCOUNTER (OUTPATIENT)
Dept: WOUND CARE | Age: 68
Discharge: HOME | End: 2018-01-31
Payer: MEDICARE

## 2018-01-31 VITALS
WEIGHT: 204 LBS | SYSTOLIC BLOOD PRESSURE: 113 MMHG | RESPIRATION RATE: 20 BRPM | HEART RATE: 66 BPM | TEMPERATURE: 98.7 F | DIASTOLIC BLOOD PRESSURE: 66 MMHG | HEIGHT: 71 IN | BODY MASS INDEX: 28.56 KG/M2

## 2018-01-31 PROCEDURE — 97597 DBRDMT OPN WND 1ST 20 CM/<: CPT

## 2018-01-31 PROCEDURE — 97597 DBRDMT OPN WND 1ST 20 CM/<: CPT | Performed by: SURGERY

## 2018-01-31 RX ORDER — OXYCODONE HYDROCHLORIDE 5 MG/1
5 CAPSULE ORAL EVERY 4 HOURS PRN
COMMUNITY
End: 2018-02-14 | Stop reason: ALTCHOICE

## 2018-01-31 ASSESSMENT — PAIN DESCRIPTION - FREQUENCY: FREQUENCY: INTERMITTENT

## 2018-01-31 ASSESSMENT — PAIN DESCRIPTION - LOCATION: LOCATION: LEG

## 2018-01-31 ASSESSMENT — PAIN DESCRIPTION - PAIN TYPE: TYPE: ACUTE PAIN

## 2018-01-31 ASSESSMENT — PAIN DESCRIPTION - DESCRIPTORS: DESCRIPTORS: ACHING

## 2018-01-31 ASSESSMENT — PAIN DESCRIPTION - ORIENTATION: ORIENTATION: RIGHT

## 2018-01-31 ASSESSMENT — PAIN SCALES - GENERAL: PAINLEVEL_OUTOF10: 3

## 2018-02-03 NOTE — PROGRESS NOTES
9:06 AM   Wound Depth (cm)  1. 1/31/2018  9:06 AM   Calculated Wound Size (cm^2) (l*w) 0.75 cm^2 1/31/2018  9:06 AM   Change in Wound Size % (l*w) 68.75 1/31/2018  9:06 AM   Tunneling Position ___ O'Clock 0 1/31/2018  8:37 AM   Undermining Starts ___ O'Clock 0 1/31/2018  8:37 AM   Undermining Ends___ O'Clock 0 1/31/2018  8:37 AM   Undermining Maxium Distance (cm) 0 1/31/2018  8:37 AM   Wound Assessment Slough; Yellow;Granulation tissue;Pink 1/31/2018  8:37 AM   Drainage Amount Moderate 1/31/2018  8:37 AM   Drainage Description Serosanguinous 1/31/2018  8:37 AM   Odor None 1/31/2018  8:37 AM   Margins Attached edges 1/31/2018  8:37 AM   Exposed structure Fascia 1/24/2018 12:06 PM   Janel-wound Assessment Painful;Pink 1/31/2018  8:37 AM   Non-staged Wound Description Full thickness 1/24/2018 12:06 PM   [place]%Wound Bed 70 1/31/2018  8:37 AM   Red%Wound Bed 0 1/24/2018 12:06 PM   Yellow%Wound Bed 30 1/31/2018  8:37 AM   Black%Wound Bed 0 1/24/2018 12:06 PM   Purple%Wound Bed 0 1/24/2018 12:06 PM   Other%Wound Bed 0 1/24/2018 12:06 PM   Culture Taken No 1/17/2018  1:40 PM   Debridement per physician Partial thickness 1/31/2018  9:06 AM   Time out Yes 1/31/2018  9:06 AM   Procedural Pain 3 1/31/2018  9:06 AM   Post procedural Pain 1 1/31/2018  9:06 AM   Number of days: 23       Wound 01/10/18 Surgical dehiscence Leg Right;Medial Wound 3, Surgical dehiscence, R. Medial BKA (mislabeled as #5) (Active)   Wound Image   1/31/2018  8:37 AM   Wound Type Wound 1/31/2018  8:37 AM   Wound Other 1/31/2018  8:37 AM   Dressing Status Changed; Intact;Dry;Clean 1/17/2018  3:20 PM   Dressing Changed Changed/New 1/24/2018 12:46 PM   Dressing/Treatment Vacuum dressing 1/31/2018  8:37 AM   Wound Cleansed Rinsed/Irrigated with saline 1/31/2018  8:37 AM   Wound Length (cm) 2 cm 1/31/2018  9:07 AM   Wound Width (cm) 2 cm 1/31/2018  9:07 AM   Wound Depth (cm)  1. 1/31/2018  9:07 AM   Calculated Wound Size (cm^2) (l*w) 4 cm^2 1/31/2018  9:07 AM Change in Wound Size % (l*w) 40.74 1/31/2018  9:07 AM   Distance Tunneling (cm) 2 cm 1/31/2018  8:37 AM   Tunneling Position ___ O'Clock 10 1/31/2018  8:37 AM   Undermining Starts ___ O'Clock 0 1/31/2018  8:37 AM   Undermining Ends___ O'Clock 0 1/31/2018  8:37 AM   Undermining Maxium Distance (cm) 0 1/31/2018  8:37 AM   Wound Assessment Granulation tissue;Red;Slough; Yellow 1/31/2018  8:37 AM   Drainage Amount Moderate 1/31/2018  8:37 AM   Drainage Description Serosanguinous 1/31/2018  8:37 AM   Odor None 1/31/2018  8:37 AM   Margins Unattached edges 1/31/2018  8:37 AM   Exposed structure Muscle 1/24/2018 12:06 PM   Janel-wound Assessment Clean;Dry 1/31/2018  8:37 AM   Non-staged Wound Description Full thickness 1/31/2018  8:37 AM   [place]%Wound Bed 95 1/31/2018  8:37 AM   Red%Wound Bed 60 1/24/2018 12:06 PM   Yellow%Wound Bed 5 1/31/2018  8:37 AM   Black%Wound Bed 0 1/24/2018 12:06 PM   Purple%Wound Bed 0 1/24/2018 12:06 PM   Other%Wound Bed 0 1/24/2018 12:06 PM   Culture Taken No 1/10/2018 11:39 AM   Debridement per physician Partial thickness 1/31/2018  9:07 AM   Time out Yes 1/31/2018  9:07 AM   Procedural Pain 3 1/31/2018  9:07 AM   Post procedural Pain 1 1/31/2018  9:07 AM   Number of days: 23       Incision 11/28/17 Leg Right (Active)   Wound Assessment Dry 1/17/2018  1:40 PM   Janel-wound Assessment Red 1/17/2018  1:40 PM   Wound Length (cm) 0 cm 1/17/2018  1:40 PM   Wound Width (cm) 0 cm 1/17/2018  1:40 PM   Wound Depth (cm)  0 1/17/2018  1:40 PM   Closure Sutures 1/10/2018 10:56 AM   Culture Taken No 1/17/2018  1:40 PM   Drainage Amount None 1/17/2018  1:40 PM   Drainage Description Sanguinous 1/10/2018 10:56 AM   Odor None 1/10/2018 10:56 AM   Dressing/Treatment Ace Wrap 12/6/2017 12:33 PM   Dressing Status Clean;Dry; Intact 12/4/2017  8:50 AM   Number of days: 66      The patients pain isPain Level: 3 Pain Type: Acute pain. Wound is has slightly improved.     Please refer to nursing measurements and

## 2018-02-14 ENCOUNTER — HOSPITAL ENCOUNTER (OUTPATIENT)
Dept: WOUND CARE | Age: 68
Discharge: HOME | End: 2018-02-14
Payer: MEDICARE

## 2018-02-14 VITALS
RESPIRATION RATE: 22 BRPM | HEIGHT: 71 IN | BODY MASS INDEX: 23.8 KG/M2 | TEMPERATURE: 98.1 F | WEIGHT: 170 LBS | DIASTOLIC BLOOD PRESSURE: 59 MMHG | HEART RATE: 67 BPM | SYSTOLIC BLOOD PRESSURE: 114 MMHG

## 2018-02-14 DIAGNOSIS — T87.9 BKA STUMP COMPLICATION (HCC): Chronic | ICD-10-CM

## 2018-02-14 PROCEDURE — 97597 DBRDMT OPN WND 1ST 20 CM/<: CPT | Performed by: SURGERY

## 2018-02-14 PROCEDURE — 97597 DBRDMT OPN WND 1ST 20 CM/<: CPT

## 2018-02-14 RX ORDER — ACETAMINOPHEN 500 MG
500 TABLET ORAL 3 TIMES DAILY
Status: ON HOLD | COMMUNITY
End: 2019-04-12 | Stop reason: SDUPTHER

## 2018-02-14 RX ORDER — ACETAMINOPHEN 500 MG
500 TABLET ORAL 2 TIMES DAILY PRN
COMMUNITY

## 2018-02-14 ASSESSMENT — PAIN SCALES - GENERAL: PAINLEVEL_OUTOF10: 3

## 2018-02-14 ASSESSMENT — PAIN DESCRIPTION - DESCRIPTORS: DESCRIPTORS: OTHER (COMMENT);BURNING

## 2018-02-14 ASSESSMENT — PAIN DESCRIPTION - ORIENTATION: ORIENTATION: RIGHT

## 2018-02-14 ASSESSMENT — PAIN DESCRIPTION - LOCATION: LOCATION: LEG

## 2018-02-14 ASSESSMENT — PAIN DESCRIPTION - FREQUENCY: FREQUENCY: CONTINUOUS

## 2018-02-14 ASSESSMENT — PAIN DESCRIPTION - ONSET: ONSET: ON-GOING

## 2018-02-14 ASSESSMENT — PAIN DESCRIPTION - PAIN TYPE: TYPE: ACUTE PAIN;PHANTOM PAIN

## 2018-02-14 ASSESSMENT — PAIN DESCRIPTION - PROGRESSION: CLINICAL_PROGRESSION: NOT CHANGED

## 2018-02-14 NOTE — PLAN OF CARE
Problem: Pain:  Goal: Pain level will decrease  Pain level will decrease   Outcome: Ongoing    Goal: Control of acute pain  Control of acute pain   Outcome: Ongoing    Goal: Control of chronic pain  Control of chronic pain   Outcome: Ongoing      Problem: Wound:  Goal: Will show signs of wound healing; wound closure and no evidence of infection  Will show signs of wound healing; wound closure and no evidence of infection   Outcome: Ongoing      Problem: Venous:  Goal: Signs of wound healing will improve  Signs of wound healing will improve   Outcome: Ongoing      Problem: Falls - Risk of:  Goal: Will remain free from falls  Will remain free from falls   Outcome: Ongoing

## 2018-02-14 NOTE — PROGRESS NOTES
[facility]   Progress Note and Procedure Note      Jama Hannon  AGE: 69 y.o. GENDER: male  : 1950  TODAY'S DATE:  2018    Subjective:     Cc-Right BKA with open wound     HISTORY of PRESENT ILLNESS HPI   David Keenan is a 69 y.o. male who presents today for wound evaluation.     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) R78.074    Acquired hypothyroidism E03.9    Essential hypertension I10    Mixed hyperlipidemia E78.2    Glaucoma H40.9    Panlobular emphysema (HCC) J43.1    Stage 3 chronic kidney disease N18.3    Cirrhosis of liver (HCC) K74.60    Acute blood loss as cause of postoperative anemia D62    S/P BKA (below knee amputation), right (HCC) Z89.511    Cirrhosis of liver with ascites (HCC) K74.60    BKA stump complication (HCC) X32.9       ALLERGIES    Allergies   Allergen Reactions    Prazosin            Objective:      BP (!) 114/59   Pulse 67   Temp 98.1 °F (36.7 °C) (Temporal)   Resp 22   Ht 5' 11\" (1.803 m)   Wt 170 lb (77.1 kg)   BMI 23.71 kg/m²     Post Debridement Measurements and Assessment:  Wound 01/10/18 Surgical dehiscence Leg Right;Lateral Wound 2, Surgical dehiscence, R. Lateral BKA (mislabeled as #4) (Active)   Wound Image    2018 10:02 AM   Wound Type Wound 2018 10:02 AM   Wound Other 2018 10:02 AM   Dressing Status Old drainage 2018 10:02 AM   Dressing Changed Changed/New 2018 12:46 PM   Dressing/Treatment Vacuum dressing 2018 12:46 PM   Wound Cleansed Rinsed/Irrigated with saline 2018 10:02 AM   Wound Length (cm) 0.4 cm 2/14/2018 10:26 AM   Wound Width (cm) 1.2 cm 2/14/2018 10:26 AM   Wound Depth (cm)  0.9 2/14/2018 10:26 AM   Calculated Wound Size (cm^2) (l*w) 0.48 cm^2 2/14/2018 10:26 AM   Change in Wound Size % (l*w) 80 2/14/2018 10:26 AM   Tunneling Position ___ O'Clock 0 2/14/2018 10:02 AM   Undermining Starts ___ O'Clock 0 2/14/2018 10:02 AM   Undermining Ends___ O'Clock 0 2/14/2018 10:02 AM   Undermining Maxium Distance (cm) 0 2/14/2018 10:02 AM   Wound Assessment Granulation tissue;Pink;Slough; Yellow 2/14/2018 10:02 AM   Drainage Amount Small 2/14/2018 10:02 AM   Drainage Description Serosanguinous 2/14/2018 10:02 AM   Odor None 2/14/2018 10:02 AM   Margins Attached edges 2/14/2018 10:02 AM   Exposed structure Fascia 1/24/2018 12:06 PM   Janel-wound Assessment Dry; Intact 2/14/2018 10:02 AM   Non-staged Wound Description Full thickness 1/24/2018 12:06 PM   [place]%Wound Bed 95 2/14/2018 10:02 AM   Red%Wound Bed 0 2/14/2018 10:02 AM   Yellow%Wound Bed 5 2/14/2018 10:02 AM   Black%Wound Bed 0 2/14/2018 10:02 AM   Purple%Wound Bed 0 2/14/2018 10:02 AM   Other%Wound Bed 0 2/14/2018 10:02 AM   Culture Taken No 1/17/2018  1:40 PM   Debridement per physician Partial thickness 2/14/2018 10:26 AM   Time out Yes 2/14/2018 10:26 AM   Procedural Pain 0 2/14/2018 10:26 AM   Post procedural Pain 0 2/14/2018 10:26 AM   Number of days: 34       Wound 01/10/18 Surgical dehiscence Leg Right;Medial Wound 3, Surgical dehiscence, R. Medial BKA (mislabeled as #5) (Active)   Wound Image   2/14/2018 10:02 AM   Wound Type Wound 2/14/2018 10:02 AM   Wound Other 2/14/2018 10:02 AM   Dressing Status Old drainage 2/14/2018 10:02 AM   Dressing Changed Changed/New 2/14/2018 10:02 AM   Dressing/Treatment Vacuum dressing 1/31/2018  8:37 AM   Wound Cleansed Rinsed/Irrigated with saline 2/14/2018 10:02 AM   Wound Length (cm) 0.9 cm 2/14/2018 10:26 AM   Wound Width (cm) 1.4 cm 2/14/2018 10:26 AM   Wound Depth (cm)  0.5 2/14/2018 10:26 AM   Calculated Wound Size

## 2018-02-22 NOTE — PROGRESS NOTES
biofilm    Percent of Wound Debrided: 100%      Bleeding: Minimal    Hemostasis:   by pressure      Response to treatment:  Well tolerated by patient. 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)    Acquired hypothyroidism    Essential hypertension    Mixed hyperlipidemia    Glaucoma    Panlobular emphysema ([address]    Stage 3 chronic kidney disease    Cirrhosis of liver ([address]    Acute blood loss as cause of postoperative anemia    S/P BKA (below knee amputation), right (HCC)    Cirrhosis of liver with ascites ([address]    BKA stump complication ([facility]          Plan:          Plan for wound - Dress per physician order  Treatment:     Compression : Yes   Offloading : Yes   Dressing : see avs   Additional Therapy : start VAC on arrival     1. Discussed appropriate home care of this wound. Wound redressed. 2. Patient instructions were given. 3. Follow up: 1 week(s).                            Electronically signed by Zohreh Cruz MD on 2/22/2018 at 4:40 PM

## 2018-02-28 ENCOUNTER — HOSPITAL ENCOUNTER (OUTPATIENT)
Dept: WOUND CARE | Age: 68
Discharge: HOME | End: 2018-02-28
Payer: MEDICARE

## 2018-02-28 VITALS
DIASTOLIC BLOOD PRESSURE: 59 MMHG | BODY MASS INDEX: 23.8 KG/M2 | SYSTOLIC BLOOD PRESSURE: 125 MMHG | HEART RATE: 69 BPM | HEIGHT: 71 IN | WEIGHT: 170 LBS | RESPIRATION RATE: 24 BRPM | TEMPERATURE: 99.4 F

## 2018-02-28 PROCEDURE — 97597 DBRDMT OPN WND 1ST 20 CM/<: CPT | Performed by: SURGERY

## 2018-02-28 PROCEDURE — 97597 DBRDMT OPN WND 1ST 20 CM/<: CPT

## 2018-02-28 ASSESSMENT — PAIN DESCRIPTION - LOCATION: LOCATION: LEG;FOOT

## 2018-02-28 ASSESSMENT — PAIN DESCRIPTION - DESCRIPTORS: DESCRIPTORS: BURNING;SHARP

## 2018-02-28 ASSESSMENT — PAIN DESCRIPTION - PROGRESSION: CLINICAL_PROGRESSION: NOT CHANGED

## 2018-02-28 ASSESSMENT — PAIN DESCRIPTION - FREQUENCY: FREQUENCY: CONTINUOUS

## 2018-02-28 ASSESSMENT — PAIN DESCRIPTION - PAIN TYPE: TYPE: PHANTOM PAIN

## 2018-02-28 ASSESSMENT — PAIN DESCRIPTION - ONSET: ONSET: ON-GOING

## 2018-02-28 ASSESSMENT — PAIN DESCRIPTION - ORIENTATION: ORIENTATION: RIGHT

## 2018-02-28 ASSESSMENT — PAIN SCALES - GENERAL: PAINLEVEL_OUTOF10: 7

## 2018-02-28 NOTE — PROGRESS NOTES
(NEURONTIN) 300 MG capsule Take 2 capsules by mouth every evening (Patient taking differently: Take 300 mg by mouth 3 times daily .) 90 capsule 3    bumetanide (BUMEX) 2 MG tablet Take 1 tablet by mouth daily 30 tablet 3    metolazone (ZAROXOLYN) 2.5 MG tablet Take 1 tablet by mouth daily 30 tablet 3    spironolactone (ALDACTONE) 50 MG tablet Take 1 tablet by mouth daily 30 tablet 3     No current facility-administered medications for this encounter.       Facility-Administered Medications Ordered in Other Encounters   Medication Dose Route Frequency Provider Last Rate Last Dose    lidocaine (XYLOCAINE) 2 % jelly   Topical Once Aristeo Estrada MD         Allergies: Prazosin  Past Medical History:   Diagnosis Date    Acute kidney failure (HCC)     Benign prostatic hyperplasia without urinary obstruction     CAD (coronary artery disease)     HFpEF NHYA Class 3, Stage C    CHF (congestive heart failure) (HCC)     history of    CHF (congestive heart failure) (HCC)     Chronic kidney disease (CKD)     Cirrhosis of liver (HCC)     Ascites/ followed by Addison Funk COPD (chronic obstructive pulmonary disease) ([facility] [facility]     Diabetes mellitus ([facility] [facility]     Type 2    Emphysema lung ([facility] [facility]     Glaucoma     Gout     History of obstructive sleep apnea     Hyperlipidemia     Hypertension     Hypothyroidism     Muscle weakness     Myocardial infarction 12/2014    s/p RCA stent 12/2014    Occlusion and stenosis of left carotid artery     Personal history of pulmonary embolism 10/2014    Psychiatric problem     depression       Past Surgical History:   Procedure Laterality Date    AMPUTATION ABOVE KNEE Right 12/1/2017    RIGHT BELOW KNEE AMPUTATION performed by Aristeo Estrada MD at [address] [facility] [place] [address]  01/29/2015    23 mm mechanical valve, mitral valve annuloplasty repair w/30 mm Physio and tricuspid ring annuloplasty repair w/34 mm MC3 ring    CARDIAC CATHETERIZATION  12/11/14  [facility] (1.803 m)   Wt 184 lb (83.5 kg)   BMI 25.66 kg/m²     Constitutional - well developed, mild cachexia ? nourished. No diaphoresis or acute distress. HENT - head normocephalic. Septum appears midline. Eyes - conjunctiva normal.  EOMS normal.  No exudate. No icterus. Neck- ROM appears normal, no tracheal deviation. Cardiovascular - Regular rate and rhythm. Heart sounds are normal.  No murmur, rub, or gallop. Carotid pulses are 2+ to palpation bilaterally without bruit. Extremities - Radial and brachial pulses are 2+ to palpation bilaterally. Right femoral pulse: present 2+; Right popliteal pulse: present 1+ Right DP: barely palpable; Right PT absent; Left femoral pulse: present 2+; Left popliteal pulse: present 1+; Left DP: barely palpable; Left PT: barely palpable  No cyanosis, clubbing, or significant edema. No signs atheroembolic event. Pulmonary - effort appears normal.  No respiratory distress. Lungs - Breath sounds normal. No wheezes or rales. GI - Abdomen - soft, non tender, bowel sounds X 4 quadrants. No guarding or rebound tenderness. No distension or palpable mass. Genitourinary - deferred. Musculoskeletal - ROM appears normal.  No significant edema. Neurologic - alert and oriented X 3. Physiologic. Psychiatric - mood, affect, and behavior appear normal.  Judgment and thought processes appear normal.  Skin - Wound with exposed, necrotic achilles tendon right posterior ankle area. The patients pain isPain Level: 3 Pain Type: Acute pain. Wound is new to me   Please refer to nursing measurements and assessment regarding wound     Risk factors for atherosclerosis of all vascular beds have been reviewed with the patient including:  Family history, tobacco abuse in all forms, elevated cholesterol, hyperlipidemia, and diabetes. Options have been discussed with the patient including continued medical management vs. proceeding with debridement or amputation.   I feel the chance of healing this wound with any sort of function foot will be very difficult. Had long talk on level of amputation and advantages and disadvantages of each  He is goint to think options over. Patient has opted to proceed with continued medical management. Assessment    1. 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 Overall plan to be developed. Plan for wound - Dress per physician order  Treatment:     Compression : No   Offloading : Yes   Dressing : see avs   Additional Therapy :    Discussed appropriate home care of this wound. Wound redressed. Patient instructions were given. Follow up: 1 week.   Recommend no smoking  Offloading instructions given

## 2018-02-28 NOTE — PROGRESS NOTES
2/28/2018 10:26 AM   Wound Width (cm) 0 cm 2/28/2018 10:26 AM   Wound Depth (cm)  0 2/28/2018 10:26 AM   Calculated Wound Size (cm^2) (l*w) 0 cm^2 2/28/2018 10:26 AM   Change in Wound Size % (l*w) 100 2/28/2018 10:26 AM   Tunneling Position ___ O'Clock 0 2/28/2018  9:30 AM   Undermining Starts ___ O'Clock 0 2/28/2018  9:30 AM   Undermining Ends___ O'Clock 0 2/28/2018  9:30 AM   Undermining Maxium Distance (cm) 0 2/28/2018  9:30 AM   Wound Assessment Pink;Granulation tissue 2/28/2018  9:30 AM   Drainage Amount Scant 2/28/2018  9:30 AM   Drainage Description Serosanguinous 2/28/2018  9:30 AM   Odor None 2/28/2018  9:30 AM   Margins Attached edges 2/28/2018  9:30 AM   Exposed structure Fascia 1/24/2018 12:06 PM   Janel-wound Assessment Dry; Intact 2/28/2018  9:30 AM   Non-staged Wound Description Full thickness 2/28/2018  9:30 AM   [place]%Wound Bed 99 2/28/2018  9:30 AM   Red%Wound Bed 0 2/28/2018  9:30 AM   Yellow%Wound Bed 1 2/28/2018  9:30 AM   Black%Wound Bed 0 2/28/2018  9:30 AM   Purple%Wound Bed 0 2/28/2018  9:30 AM   Other%Wound Bed 0 2/28/2018  9:30 AM   Culture Taken No 1/17/2018  1:40 PM   Debridement per physician None 2/28/2018 10:26 AM   Time out N/A 2/28/2018 10:26 AM   Procedural Pain 0 2/14/2018 10:26 AM   Post procedural Pain 0 2/14/2018 10:26 AM   Number of days: 48       Wound 01/10/18 Surgical dehiscence Leg Right;Medial Wound 3, Surgical dehiscence, R. Medial BKA (mislabeled as #5) (Active)   Wound Image   2/28/2018  9:30 AM   Wound Type Wound 2/28/2018  9:30 AM   Wound Other 2/28/2018  9:30 AM   Dressing Status Old drainage 2/28/2018  9:30 AM   Dressing Changed Changed/New 2/28/2018 11:00 AM   Dressing/Treatment Wound gel;4x4 2/28/2018 11:00 AM   Wound Cleansed Rinsed/Irrigated with saline 2/28/2018  9:30 AM   Wound Length (cm) 0.3 cm 2/28/2018 10:26 AM   Wound Width (cm) 0.4 cm 2/28/2018 10:26 AM   Wound Depth (cm)  0.1 2/28/2018 10:26 AM   Calculated Wound Size (cm^2) (l*w) 0.12 cm^2 2/28/2018 tolerated by patient. 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)    Acquired hypothyroidism    Essential hypertension    Mixed hyperlipidemia    Glaucoma    Panlobular emphysema ([address]    Stage 3 chronic kidney disease    Cirrhosis of liver ([address]    Acute blood loss as cause of postoperative anemia    S/P BKA (below knee amputation), right (HCC)    Cirrhosis of liver with ascites ([address]    BKA stump complication ([address]          Plan:          Plan for wound - Dress per physician order  Treatment:     Compression : Yes   Offloading : Yes   Dressing : see avs   Additional Therapy : OK to start Prostetic training with Bret Tirado [id number]     1. Discussed appropriate home care of this wound. Wound redressed. 2. Patient instructions were given. 3. Follow up: 4 week(s).                            Electronically signed by Pramod Isaac MD on 2/28/2018 at 11:11 AM

## 2018-03-21 ENCOUNTER — HOSPITAL ENCOUNTER (OUTPATIENT)
Dept: WOUND CARE | Age: 68
Discharge: HOME | End: 2018-03-21
Payer: MEDICARE

## 2018-03-21 VITALS
WEIGHT: 180 LBS | HEART RATE: 59 BPM | HEIGHT: 71 IN | TEMPERATURE: 98 F | RESPIRATION RATE: 22 BRPM | DIASTOLIC BLOOD PRESSURE: 52 MMHG | SYSTOLIC BLOOD PRESSURE: 118 MMHG | BODY MASS INDEX: 25.2 KG/M2

## 2018-03-21 PROCEDURE — 99212 OFFICE O/P EST SF 10 MIN: CPT | Performed by: SURGERY

## 2018-03-21 PROCEDURE — 99212 OFFICE O/P EST SF 10 MIN: CPT

## 2018-03-21 ASSESSMENT — PAIN DESCRIPTION - DESCRIPTORS: DESCRIPTORS: BURNING;SHARP

## 2018-03-21 ASSESSMENT — PAIN DESCRIPTION - LOCATION: LOCATION: FOOT;LEG

## 2018-03-21 ASSESSMENT — PAIN DESCRIPTION - ORIENTATION: ORIENTATION: RIGHT

## 2018-03-21 ASSESSMENT — PAIN DESCRIPTION - FREQUENCY: FREQUENCY: CONTINUOUS

## 2018-03-21 ASSESSMENT — PAIN DESCRIPTION - PAIN TYPE: TYPE: PHANTOM PAIN

## 2018-03-21 ASSESSMENT — PAIN DESCRIPTION - PROGRESSION: CLINICAL_PROGRESSION: NOT CHANGED

## 2018-03-21 ASSESSMENT — PAIN SCALES - GENERAL: PAINLEVEL_OUTOF10: 8

## 2018-03-21 ASSESSMENT — PAIN DESCRIPTION - ONSET: ONSET: ON-GOING

## 2018-03-21 NOTE — PLAN OF CARE
Problem: Pain:  Goal: Pain level will decrease  Pain level will decrease   Outcome: Completed Date Met: 03/21/18    Goal: Control of acute pain  Control of acute pain   Outcome: Completed Date Met: 03/21/18    Goal: Control of chronic pain  Control of chronic pain   Outcome: Completed Date Met: 03/21/18      Problem: Wound:  Goal: Will show signs of wound healing; wound closure and no evidence of infection  Will show signs of wound healing; wound closure and no evidence of infection   Outcome: Completed Date Met: 03/21/18      Problem: Falls - Risk of:  Goal: Will remain free from falls  Will remain free from falls   Outcome: Ongoing

## 2018-03-21 NOTE — PROGRESS NOTES
[facility]   Progress Note and Procedure Note      Jama Hannon  AGE: 69 y.o. GENDER: male  : 1950  TODAY'S DATE:  3/21/2018    Subjective:     CC Right BKA with wound   HISTORY of PRESENT ILLNESS HPI   Mateo Conway is a 69 y.o. male who presents today for wound evaluation.     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) Q51.549    Acquired hypothyroidism E03.9    Essential hypertension I10    Mixed hyperlipidemia E78.2    Glaucoma H40.9    Panlobular emphysema (HCC) J43.1    Stage 3 chronic kidney disease N18.3    Cirrhosis of liver (HCC) K74.60    Acute blood loss as cause of postoperative anemia D62    S/P BKA (below knee amputation), right (HCC) Z89.511    Cirrhosis of liver with ascites (HCC) K74.60    BKA stump complication (HCC) C81.3       ALLERGIES    Allergies   Allergen Reactions    Prazosin            Objective:      BP (!) 118/52   Pulse 59   Temp 98 °F (36.7 °C) (Temporal)   Resp 22   Ht 5' 11\" (1.803 m)   Wt 180 lb (81.6 kg)   BMI 25.10 kg/m²     Post Debridement Measurements and Assessment:  Wound 01/10/18 Surgical dehiscence Leg Right;Medial Wound 3, Surgical dehiscence, R. Medial BKA (mislabeled as #5) (Active)   Wound Image   3/21/2018  8:49 AM   Wound Type Wound 3/21/2018  8:49 AM   Wound Other 3/21/2018  8:49 AM   Dressing Status Old drainage 2018  9:30 AM   Dressing Changed Changed/New 2018 11:00 AM   Dressing/Treatment Wound gel;4x4 2018 11:00 AM   Wound Cleansed Rinsed/Irrigated with saline 3/21/2018  8:49 AM   Wound Length (cm) 0 cm 3/21/2018  8:49 AM   Wound Width (cm) 0 cm 3/21/2018  8:49 AM   Wound Depth (cm)  0 3/21/2018  8:49 AM   Calculated Wound Size (cm^2) (l*w) 0 cm^2 3/21/2018  8:49 AM   Change in Wound Size % (l*w) 100 3/21/2018  8:49 AM   Distance Tunneling (cm) 0 cm 2/28/2018  9:30 AM   Tunneling Position ___ O'Clock 0 2/28/2018  9:30 AM   Undermining Starts ___ O'Clock 0 2/28/2018  9:30 AM   Undermining Ends___ O'Clock 0 2/28/2018  9:30 AM   Undermining Maxium Distance (cm) 0 2/28/2018  9:30 AM   Wound Assessment Epithelialization 3/21/2018  8:49 AM   Drainage Amount None 3/21/2018  8:49 AM   Drainage Description Serosanguinous 2/28/2018  9:30 AM   Odor None 3/21/2018  8:49 AM   Margins Attached edges 3/21/2018  8:49 AM   Exposed structure Muscle 1/24/2018 12:06 PM   Janel-wound Assessment Dry; Intact 3/21/2018  8:49 AM   Non-staged Wound Description Full thickness 2/28/2018  9:30 AM   [place]%Wound Bed 100 3/21/2018  8:49 AM   Red%Wound Bed 0 3/21/2018  8:49 AM   Yellow%Wound Bed 0 3/21/2018  8:49 AM   Black%Wound Bed 0 3/21/2018  8:49 AM   Purple%Wound Bed 0 3/21/2018  8:49 AM   Other%Wound Bed 0 2/28/2018  9:30 AM   Culture Taken No 1/10/2018 11:39 AM   Debridement per physician Partial thickness 2/28/2018 10:26 AM   Time out Yes 2/28/2018 10:26 AM   Procedural Pain 0 2/28/2018 10:26 AM   Post procedural Pain 0 2/28/2018 10:26 AM   Number of days: 69       Incision 12/01/17 Leg Right (Active)   Number of days: 109      The patients pain isPain Level: 8 Pain Type: Phantom pain. Wound is healed.     Please refer to nursing measurements and assessment regarding wound    Procedure Note:None    Wound #: 3       Assessment:      Patient Active Problem List   Diagnosis    History of heart valve replacement with mechanical valve    Type 2 diabetes mellitus with skin complication ([address]    Acquired hypothyroidism    Essential hypertension    Mixed hyperlipidemia    Glaucoma    [facility]    Stage 3 chronic kidney disease    Cirrhosis of liver ([facility]    Acute blood loss as cause of postoperative anemia    S/P BKA (below knee amputation), right (HCC)    Cirrhosis of liver with ascites ([address]    BKA stump complication ([address]      At this point patient can proceed with prosthetic training and rehab. He is capable of being a function level 2 community walker. Wound is healed and simple needs moisturizing and protection. Plan:          Plan for wound - Dress per physician order  Treatment:     Compression : Yes   Offloading : Yes   Dressing : see ave   Additional Therapy : OK to proceed with prosthetic training and rehab     1. Discussed appropriate home care of this wound. Wound redressed. 2. Patient instructions were given. 3. Follow up: prn week(s).                            Electronically signed by Ellyn Vargas MD on 3/21/2018 at 10:19 AM

## 2018-04-12 ENCOUNTER — OUTSIDE FACILITY SERVICE (OUTPATIENT)
Dept: CARDIOLOGY | Facility: CLINIC | Age: 68
End: 2018-04-12

## 2018-04-12 PROCEDURE — 93010 ELECTROCARDIOGRAM REPORT: CPT | Performed by: INTERNAL MEDICINE

## 2018-10-22 ENCOUNTER — APPOINTMENT (OUTPATIENT)
Dept: CT IMAGING | Age: 68
End: 2018-10-22
Payer: COMMERCIAL

## 2018-10-22 ENCOUNTER — HOSPITAL ENCOUNTER (EMERGENCY)
Age: 68
Discharge: HOME | End: 2018-10-22
Payer: COMMERCIAL

## 2018-10-22 VITALS
TEMPERATURE: 98.2 F | RESPIRATION RATE: 16 BRPM | OXYGEN SATURATION: 98 % | HEART RATE: 72 BPM | DIASTOLIC BLOOD PRESSURE: 85 MMHG | SYSTOLIC BLOOD PRESSURE: 135 MMHG

## 2018-10-22 DIAGNOSIS — R79.1 SUBTHERAPEUTIC INTERNATIONAL NORMALIZED RATIO (INR): ICD-10-CM

## 2018-10-22 DIAGNOSIS — S09.90XA CLOSED HEAD INJURY, INITIAL ENCOUNTER: Primary | ICD-10-CM
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
## 2018-10-22 DIAGNOSIS — Z86.19 HISTORY OF CLOSTRIDIUM DIFFICILE COLITIS: ICD-10-CM

## 2018-10-22 DIAGNOSIS — Z79.01 ANTICOAGULATED ON COUMADIN: ICD-10-CM

## 2018-10-22 DIAGNOSIS — S00.03XA HEMATOMA OF LEFT PARIETAL SCALP, INITIAL ENCOUNTER: ICD-10-CM

## 2018-10-22 LAB
ALBUMIN SERPL-MCNC: 3.8 G/DL (ref 3.5–5.2)
ALP BLD-CCNC: 132 U/L (ref 40–130)
ALT SERPL-CCNC: 19 U/L (ref 5–41)
ANION GAP SERPL CALCULATED.3IONS-SCNC: 10 MMOL/L (ref 7–19)
AST SERPL-CCNC: 23 U/L (ref 5–40)
BASOPHILS ABSOLUTE: 0.1 K/UL (ref 0–0.2)
BASOPHILS RELATIVE PERCENT: 0.7 % (ref 0–1)
BILIRUB SERPL-MCNC: 0.3 MG/DL (ref 0.2–1.2)
BUN BLDV-MCNC: 37 MG/DL (ref 8–23)
CALCIUM SERPL-MCNC: 9.4 MG/DL (ref 8.8–10.2)
CHLORIDE BLD-SCNC: 95 MMOL/L (ref 98–111)
CO2: 33 MMOL/L (ref 22–29)
CREAT SERPL-MCNC: 1.2 MG/DL (ref 0.5–1.2)
EOSINOPHILS ABSOLUTE: 0.3 K/UL (ref 0–0.6)
EOSINOPHILS RELATIVE PERCENT: 4.2 % (ref 0–5)
GFR NON-AFRICAN AMERICAN: 60
GLUCOSE BLD-MCNC: 192 MG/DL (ref 74–109)
HCT VFR BLD CALC: 35.1 % (ref 42–52)
HEMOGLOBIN: 10.7 G/DL (ref 14–18)
INR BLD: 1.7 (ref 0.88–1.18)
LYMPHOCYTES ABSOLUTE: 0.7 K/UL (ref 1.1–4.5)
LYMPHOCYTES RELATIVE PERCENT: 11 % (ref 20–40)
MCH RBC QN AUTO: 26.8 PG (ref 27–31)
MCHC RBC AUTO-ENTMCNC: 30.5 G/DL (ref 33–37)
MCV RBC AUTO: 88 FL (ref 80–94)
MONOCYTES ABSOLUTE: 0.5 K/UL (ref 0–0.9)
MONOCYTES RELATIVE PERCENT: 7.8 % (ref 0–10)
NEUTROPHILS ABSOLUTE: 5.1 K/UL (ref 1.5–7.5)
NEUTROPHILS RELATIVE PERCENT: 76 % (ref 50–65)
PDW BLD-RTO: 16.2 % (ref 11.5–14.5)
PLATELET # BLD: 173 K/UL (ref 130–400)
PMV BLD AUTO: 9 FL (ref 9.4–12.4)
POTASSIUM SERPL-SCNC: 4.4 MMOL/L (ref 3.5–5)
PROTHROMBIN TIME: 20 SEC (ref 12–14.6)
RBC # BLD: 3.99 M/UL (ref 4.7–6.1)
SODIUM BLD-SCNC: 138 MMOL/L (ref 136–145)
TOTAL PROTEIN: 7.7 G/DL (ref 6.6–8.7)
WBC # BLD: 6.7 K/UL (ref 4.8–10.8)

## 2018-10-22 PROCEDURE — 70450 CT HEAD/BRAIN W/O DYE: CPT

## 2018-10-22 PROCEDURE — 85025 COMPLETE CBC W/AUTO DIFF WBC: CPT

## 2018-10-22 PROCEDURE — 85610 PROTHROMBIN TIME: CPT

## 2018-10-22 PROCEDURE — 99284 EMERGENCY DEPT VISIT MOD MDM: CPT

## 2018-10-22 PROCEDURE — 2580000003 HC RX 258: Performed by: NURSE PRACTITIONER

## 2018-10-22 PROCEDURE — 36415 COLL VENOUS BLD VENIPUNCTURE: CPT

## 2018-10-22 PROCEDURE — 80053 COMPREHEN METABOLIC PANEL: CPT

## 2018-10-22 PROCEDURE — 99284 EMERGENCY DEPT VISIT MOD MDM: CPT | Performed by: NURSE PRACTITIONER

## 2018-10-22 RX ORDER — 0.9 % SODIUM CHLORIDE 0.9 %
1000 INTRAVENOUS SOLUTION INTRAVENOUS ONCE
Status: COMPLETED | OUTPATIENT
Start: 2018-10-22 | End: 2018-10-22

## 2018-10-22 RX ADMIN — SODIUM CHLORIDE 1000 ML: 9 INJECTION, SOLUTION INTRAVENOUS at 18:09

## 2018-10-22 ASSESSMENT — ENCOUNTER SYMPTOMS
RESPIRATORY NEGATIVE: 1
DIARRHEA: 1
ABDOMINAL PAIN: 0

## 2018-10-22 NOTE — ED PROVIDER NOTES
[facility]  eMERGENCY dEPARTMENT eNCOUnter      Pt Name: Maritza Sauceda  MRN: [id number]  [place] 1950  Date of evaluation: 10/22/2018  Provider: Kemar Betts, [address]       Chief Complaint   Patient presents with    Fall         HISTORY OF PRESENT ILLNESS   (Location/Symptom, Timing/Onset,Context/Setting, Quality, Duration, Modifying Factors, Severity)  Note limiting factors. Han Acosta a 69 y.o. male who presents to the emergency department for evaluation of fall. Pt tells me that he tripped striking his head during fall. Wife relates that he experienced brief loss of consciousness. He tells me that he has had diarrhea over past week anticipating treatment for c.diff as prescribed by his pcp. He denies abdominal pain as well as fever. He has history of stroke with problems with memory. Pt's wife tells me that patient's mental status is at his baseline. Pt tells me that his tetanus immunization is up to date. HPI    Nursing Notes were reviewed. REVIEW OF SYSTEMS    (2-9 systems for level 4, 10 or more for level 5)     Review of Systems   Constitutional: Negative. Respiratory: Negative. Cardiovascular: Negative. Gastrointestinal: Positive for diarrhea. Negative for abdominal pain. Neurological: Negative. A complete review of systems was performed and is negative except as noted above in the HPI.        PAST MEDICAL HISTORY     Past Medical History:   Diagnosis Date    Acute kidney failure (HCC)     Benign prostatic hyperplasia without urinary obstruction     CAD (coronary artery disease)     HFpEF NHYA Class 3, Stage C    CHF (congestive heart failure) (HCC)     history of    CHF (congestive heart failure) (HCC)     Chronic kidney disease (CKD)     Cirrhosis of liver (HCC)     Ascites/ followed by [facility]    COPD (chronic obstructive pulmonary disease) [facility]     Diabetes mellitus ([facility]     Type 2    Emphysema lung ([facility]     Efforts were made to edit the dictations but occasionally words are mis-transcribed.)              Aimee Gleason, ALEXANDRA  10/23/18 0905

## 2018-10-22 NOTE — ED TRIAGE NOTES
Pt reports to ED via [facility] after having a fall and striking his head. Pt has abrasion to the back of his head and is currently on coumadin therapy.

## 2019-03-28 ENCOUNTER — OUTSIDE FACILITY SERVICE (OUTPATIENT)
Dept: CARDIOLOGY | Facility: CLINIC | Age: 69
End: 2019-03-28

## 2019-03-28 PROCEDURE — 93010 ELECTROCARDIOGRAM REPORT: CPT | Performed by: INTERNAL MEDICINE

## 2019-04-12 ENCOUNTER — HOSPITAL ENCOUNTER (INPATIENT)
Age: 69
LOS: 5 days | Discharge: HOME HEALTH | DRG: 286 | End: 2019-04-17
Attending: HOSPITALIST | Admitting: INTERNAL MEDICINE
Payer: COMMERCIAL

## 2019-04-12 DIAGNOSIS — Z95.2 S/P AVR: Primary | ICD-10-CM

## 2019-04-12 PROBLEM — R00.1 SYMPTOMATIC BRADYCARDIA: Status: ACTIVE | Noted: 2019-04-12

## 2019-04-12 LAB
ALBUMIN SERPL-MCNC: 3.7 G/DL (ref 3.5–5.2)
ALP BLD-CCNC: 107 U/L (ref 40–130)
ALT SERPL-CCNC: 13 U/L (ref 5–41)
ANION GAP SERPL CALCULATED.3IONS-SCNC: 16 MMOL/L (ref 7–19)
ANISOCYTOSIS: 1
APTT: 51.8 SEC (ref 26–36.2)
AST SERPL-CCNC: 18 U/L (ref 5–40)
BASE EXCESS ARTERIAL: 8.5 MMOL/L (ref -2–2)
BASOPHILS ABSOLUTE: 0 K/UL (ref 0–0.2)
BASOPHILS RELATIVE PERCENT: 0 % (ref 0–1)
BILIRUB SERPL-MCNC: 0.3 MG/DL (ref 0.2–1.2)
BUN BLDV-MCNC: 69 MG/DL (ref 8–23)
CALCIUM SERPL-MCNC: 8.9 MG/DL (ref 8.8–10.2)
CARBOXYHEMOGLOBIN ARTERIAL: 2.7 % (ref 0–5)
CHLORIDE BLD-SCNC: 95 MMOL/L (ref 98–111)
CHOLESTEROL, TOTAL: 89 MG/DL (ref 160–199)
CO2: 29 MMOL/L (ref 22–29)
CREAT SERPL-MCNC: 1.8 MG/DL (ref 0.5–1.2)
EOSINOPHILS ABSOLUTE: 0 K/UL (ref 0–0.6)
EOSINOPHILS RELATIVE PERCENT: 0 % (ref 0–5)
GFR NON-AFRICAN AMERICAN: 37
GLUCOSE BLD-MCNC: 213 MG/DL (ref 70–99)
GLUCOSE BLD-MCNC: 225 MG/DL (ref 74–109)
GLUCOSE BLD-MCNC: 231 MG/DL (ref 70–99)
GLUCOSE BLD-MCNC: 296 MG/DL (ref 70–99)
HBA1C MFR BLD: 6.6 % (ref 4–6)
HCO3 ARTERIAL: 35.3 MMOL/L (ref 22–26)
HCT VFR BLD CALC: 33.1 % (ref 42–52)
HDLC SERPL-MCNC: 38 MG/DL (ref 55–121)
HEMOGLOBIN, ART, EXTENDED: 9.9 G/DL (ref 14–18)
HEMOGLOBIN: 9.4 G/DL (ref 14–18)
HYPOCHROMIA: 2
INR BLD: 2.86 (ref 0.88–1.18)
LDL CHOLESTEROL CALCULATED: 37 MG/DL
LV EF: 58 %
LVEF MODALITY: NORMAL
LYMPHOCYTES ABSOLUTE: 0.1 K/UL (ref 1.1–4.5)
LYMPHOCYTES RELATIVE PERCENT: 4.5 % (ref 20–40)
MAGNESIUM: 2.3 MG/DL (ref 1.6–2.4)
MCH RBC QN AUTO: 25.3 PG (ref 27–31)
MCHC RBC AUTO-ENTMCNC: 28.4 G/DL (ref 33–37)
MCV RBC AUTO: 89 FL (ref 80–94)
METHEMOGLOBIN ARTERIAL: 1.3 %
MONOCYTES ABSOLUTE: 0 K/UL (ref 0–0.9)
MONOCYTES RELATIVE PERCENT: 1.3 % (ref 0–10)
NEUTROPHILS ABSOLUTE: 2.9 K/UL (ref 1.5–7.5)
NEUTROPHILS RELATIVE PERCENT: 93.9 % (ref 50–65)
O2 CONTENT ARTERIAL: 11.7 ML/DL
O2 SAT, ARTERIAL: 83.8 %
O2 THERAPY: ABNORMAL
OVALOCYTES: ABNORMAL
PCO2 ARTERIAL: 61 MMHG (ref 35–45)
PDW BLD-RTO: 16.4 % (ref 11.5–14.5)
PERFORMED ON: ABNORMAL
PH ARTERIAL: 7.37 (ref 7.35–7.45)
PLATELET # BLD: 161 K/UL (ref 130–400)
PLATELET SLIDE REVIEW: ADEQUATE
PMV BLD AUTO: 9.9 FL (ref 9.4–12.4)
PO2 ARTERIAL: 48 MMHG (ref 80–100)
POLYCHROMASIA: 1
POTASSIUM REFLEX MAGNESIUM: 5.4 MMOL/L (ref 3.5–5)
POTASSIUM, WHOLE BLOOD: 4.9
PROTHROMBIN TIME: 29.2 SEC (ref 12–14.6)
RBC # BLD: 3.72 M/UL (ref 4.7–6.1)
SODIUM BLD-SCNC: 140 MMOL/L (ref 136–145)
TOTAL PROTEIN: 6.8 G/DL (ref 6.6–8.7)
TRIGL SERPL-MCNC: 70 MG/DL (ref 0–149)
TROPONIN: <0.01 NG/ML (ref 0–0.03)
TROPONIN: <0.01 NG/ML (ref 0–0.03)
WBC # BLD: 3.1 K/UL (ref 4.8–10.8)

## 2019-04-12 PROCEDURE — 82948 REAGENT STRIP/BLOOD GLUCOSE: CPT

## 2019-04-12 PROCEDURE — 82803 BLOOD GASES ANY COMBINATION: CPT

## 2019-04-12 PROCEDURE — 80061 LIPID PANEL: CPT

## 2019-04-12 PROCEDURE — 93005 ELECTROCARDIOGRAM TRACING: CPT

## 2019-04-12 PROCEDURE — 87081 CULTURE SCREEN ONLY: CPT

## 2019-04-12 PROCEDURE — 6370000000 HC RX 637 (ALT 250 FOR IP): Performed by: HOSPITALIST

## 2019-04-12 PROCEDURE — 94664 DEMO&/EVAL PT USE INHALER: CPT

## 2019-04-12 PROCEDURE — 83036 HEMOGLOBIN GLYCOSYLATED A1C: CPT

## 2019-04-12 PROCEDURE — 99291 CRITICAL CARE FIRST HOUR: CPT | Performed by: HOSPITALIST

## 2019-04-12 PROCEDURE — 85610 PROTHROMBIN TIME: CPT

## 2019-04-12 PROCEDURE — 6360000004 HC RX CONTRAST MEDICATION: Performed by: INTERNAL MEDICINE

## 2019-04-12 PROCEDURE — 94660 CPAP INITIATION&MGMT: CPT

## 2019-04-12 PROCEDURE — 6370000000 HC RX 637 (ALT 250 FOR IP): Performed by: INTERNAL MEDICINE

## 2019-04-12 PROCEDURE — 80053 COMPREHEN METABOLIC PANEL: CPT

## 2019-04-12 PROCEDURE — 84484 ASSAY OF TROPONIN QUANT: CPT

## 2019-04-12 PROCEDURE — 2580000003 HC RX 258: Performed by: HOSPITALIST

## 2019-04-12 PROCEDURE — 99223 1ST HOSP IP/OBS HIGH 75: CPT | Performed by: INTERNAL MEDICINE

## 2019-04-12 PROCEDURE — 85025 COMPLETE CBC W/AUTO DIFF WBC: CPT

## 2019-04-12 PROCEDURE — 94640 AIRWAY INHALATION TREATMENT: CPT

## 2019-04-12 PROCEDURE — 84132 ASSAY OF SERUM POTASSIUM: CPT

## 2019-04-12 PROCEDURE — 36415 COLL VENOUS BLD VENIPUNCTURE: CPT

## 2019-04-12 PROCEDURE — 36600 WITHDRAWAL OF ARTERIAL BLOOD: CPT

## 2019-04-12 PROCEDURE — 83735 ASSAY OF MAGNESIUM: CPT

## 2019-04-12 PROCEDURE — 2100000000 HC CCU R&B

## 2019-04-12 PROCEDURE — 2700000000 HC OXYGEN THERAPY PER DAY

## 2019-04-12 PROCEDURE — 6360000002 HC RX W HCPCS: Performed by: HOSPITALIST

## 2019-04-12 PROCEDURE — C8929 TTE W OR WO FOL WCON,DOPPLER: HCPCS

## 2019-04-12 PROCEDURE — 85730 THROMBOPLASTIN TIME PARTIAL: CPT

## 2019-04-12 RX ORDER — GUAIFENESIN 600 MG/1
1200 TABLET, EXTENDED RELEASE ORAL 2 TIMES DAILY PRN
Status: DISCONTINUED | OUTPATIENT
Start: 2019-04-12 | End: 2019-04-17 | Stop reason: HOSPADM

## 2019-04-12 RX ORDER — METOLAZONE 2.5 MG/1
2.5 TABLET ORAL DAILY
Status: DISCONTINUED | OUTPATIENT
Start: 2019-04-12 | End: 2019-04-17 | Stop reason: HOSPADM

## 2019-04-12 RX ORDER — ALBUTEROL SULFATE 2.5 MG/3ML
2.5 SOLUTION RESPIRATORY (INHALATION) EVERY 4 HOURS PRN
Status: DISCONTINUED | OUTPATIENT
Start: 2019-04-12 | End: 2019-04-17 | Stop reason: HOSPADM

## 2019-04-12 RX ORDER — DEXTROSE MONOHYDRATE 50 MG/ML
100 INJECTION, SOLUTION INTRAVENOUS PRN
Status: DISCONTINUED | OUTPATIENT
Start: 2019-04-12 | End: 2019-04-17 | Stop reason: HOSPADM

## 2019-04-12 RX ORDER — SODIUM CHLORIDE 0.9 % (FLUSH) 0.9 %
10 SYRINGE (ML) INJECTION EVERY 12 HOURS SCHEDULED
Status: DISCONTINUED | OUTPATIENT
Start: 2019-04-12 | End: 2019-04-17 | Stop reason: SDUPTHER

## 2019-04-12 RX ORDER — LATANOPROST 50 UG/ML
1 SOLUTION/ DROPS OPHTHALMIC DAILY
Status: DISCONTINUED | OUTPATIENT
Start: 2019-04-13 | End: 2019-04-17 | Stop reason: HOSPADM

## 2019-04-12 RX ORDER — FERROUS SULFATE 325(65) MG
325 TABLET ORAL
Status: DISCONTINUED | OUTPATIENT
Start: 2019-04-12 | End: 2019-04-17 | Stop reason: HOSPADM

## 2019-04-12 RX ORDER — ASPIRIN 81 MG/1
81 TABLET ORAL DAILY
Status: DISCONTINUED | OUTPATIENT
Start: 2019-04-12 | End: 2019-04-17 | Stop reason: HOSPADM

## 2019-04-12 RX ORDER — SODIUM CHLORIDE 0.9 % (FLUSH) 0.9 %
10 SYRINGE (ML) INJECTION PRN
Status: DISCONTINUED | OUTPATIENT
Start: 2019-04-12 | End: 2019-04-17 | Stop reason: SDUPTHER

## 2019-04-12 RX ORDER — ATORVASTATIN CALCIUM 40 MG/1
40 TABLET, FILM COATED ORAL NIGHTLY
Status: DISCONTINUED | OUTPATIENT
Start: 2019-04-12 | End: 2019-04-17 | Stop reason: HOSPADM

## 2019-04-12 RX ORDER — FLUTICASONE PROPIONATE 50 MCG
2 SPRAY, SUSPENSION (ML) NASAL 2 TIMES DAILY
Status: DISCONTINUED | OUTPATIENT
Start: 2019-04-12 | End: 2019-04-17 | Stop reason: HOSPADM

## 2019-04-12 RX ORDER — POLYETHYLENE GLYCOL 3350 17 G/17G
17 POWDER, FOR SOLUTION ORAL DAILY PRN
Status: DISCONTINUED | OUTPATIENT
Start: 2019-04-12 | End: 2019-04-17 | Stop reason: HOSPADM

## 2019-04-12 RX ORDER — FAMOTIDINE 20 MG/1
20 TABLET, FILM COATED ORAL DAILY
Status: DISCONTINUED | OUTPATIENT
Start: 2019-04-12 | End: 2019-04-17 | Stop reason: HOSPADM

## 2019-04-12 RX ORDER — IPRATROPIUM BROMIDE 21 UG/1
2 SPRAY, METERED NASAL DAILY
COMMUNITY

## 2019-04-12 RX ORDER — LANOLIN ALCOHOL/MO/W.PET/CERES
9 CREAM (GRAM) TOPICAL NIGHTLY PRN
COMMUNITY

## 2019-04-12 RX ORDER — WARFARIN SODIUM 5 MG/1
5 TABLET ORAL DAILY
Status: DISCONTINUED | OUTPATIENT
Start: 2019-04-13 | End: 2019-04-12

## 2019-04-12 RX ORDER — LANOLIN ALCOHOL/MO/W.PET/CERES
9 CREAM (GRAM) TOPICAL NIGHTLY PRN
Status: DISCONTINUED | OUTPATIENT
Start: 2019-04-12 | End: 2019-04-17 | Stop reason: HOSPADM

## 2019-04-12 RX ORDER — GABAPENTIN 400 MG/1
400 CAPSULE ORAL EVERY EVENING
Status: DISCONTINUED | OUTPATIENT
Start: 2019-04-12 | End: 2019-04-16

## 2019-04-12 RX ORDER — LEVOTHYROXINE SODIUM 0.05 MG/1
50 TABLET ORAL DAILY
COMMUNITY

## 2019-04-12 RX ORDER — ALLOPURINOL 100 MG/1
100 TABLET ORAL 2 TIMES DAILY
Status: DISCONTINUED | OUTPATIENT
Start: 2019-04-12 | End: 2019-04-17 | Stop reason: HOSPADM

## 2019-04-12 RX ORDER — NITROGLYCERIN 0.4 MG/1
0.4 TABLET SUBLINGUAL EVERY 5 MIN PRN
Status: DISCONTINUED | OUTPATIENT
Start: 2019-04-12 | End: 2019-04-17 | Stop reason: HOSPADM

## 2019-04-12 RX ORDER — WARFARIN SODIUM 5 MG/1
5 TABLET ORAL DAILY
Status: DISCONTINUED | OUTPATIENT
Start: 2019-04-12 | End: 2019-04-15

## 2019-04-12 RX ORDER — FERROUS SULFATE 325(65) MG
325 TABLET ORAL
COMMUNITY

## 2019-04-12 RX ORDER — FOLIC ACID 1 MG/1
1 TABLET ORAL DAILY
Status: DISCONTINUED | OUTPATIENT
Start: 2019-04-12 | End: 2019-04-17 | Stop reason: HOSPADM

## 2019-04-12 RX ORDER — FOLIC ACID 1 MG/1
1 TABLET ORAL DAILY
COMMUNITY

## 2019-04-12 RX ORDER — LACTULOSE 10 G/15ML
30 SOLUTION ORAL 2 TIMES DAILY
Status: DISCONTINUED | OUTPATIENT
Start: 2019-04-12 | End: 2019-04-17 | Stop reason: HOSPADM

## 2019-04-12 RX ORDER — LATANOPROST 50 UG/ML
1 SOLUTION/ DROPS OPHTHALMIC NIGHTLY
Status: DISCONTINUED | OUTPATIENT
Start: 2019-04-12 | End: 2019-04-12

## 2019-04-12 RX ORDER — TRAZODONE HYDROCHLORIDE 100 MG/1
100 TABLET ORAL NIGHTLY
Status: DISCONTINUED | OUTPATIENT
Start: 2019-04-12 | End: 2019-04-12

## 2019-04-12 RX ORDER — GABAPENTIN 400 MG/1
400 CAPSULE ORAL DAILY PRN
Status: ON HOLD | COMMUNITY
End: 2019-04-17 | Stop reason: HOSPADM

## 2019-04-12 RX ORDER — LEVOTHYROXINE SODIUM 0.05 MG/1
50 TABLET ORAL DAILY
Status: DISCONTINUED | OUTPATIENT
Start: 2019-04-12 | End: 2019-04-17 | Stop reason: HOSPADM

## 2019-04-12 RX ORDER — BUMETANIDE 1 MG/1
2 TABLET ORAL DAILY
Status: DISCONTINUED | OUTPATIENT
Start: 2019-04-12 | End: 2019-04-17 | Stop reason: HOSPADM

## 2019-04-12 RX ORDER — INSULIN GLARGINE 100 [IU]/ML
42 INJECTION, SOLUTION SUBCUTANEOUS NIGHTLY
Status: DISCONTINUED | OUTPATIENT
Start: 2019-04-12 | End: 2019-04-17

## 2019-04-12 RX ORDER — DEXTROSE MONOHYDRATE 25 G/50ML
12.5 INJECTION, SOLUTION INTRAVENOUS PRN
Status: DISCONTINUED | OUTPATIENT
Start: 2019-04-12 | End: 2019-04-17 | Stop reason: HOSPADM

## 2019-04-12 RX ORDER — LIDOCAINE 50 MG/G
OINTMENT TOPICAL 4 TIMES DAILY PRN
COMMUNITY

## 2019-04-12 RX ORDER — LIDOCAINE 50 MG/G
OINTMENT TOPICAL 4 TIMES DAILY PRN
Status: DISCONTINUED | OUTPATIENT
Start: 2019-04-12 | End: 2019-04-17 | Stop reason: HOSPADM

## 2019-04-12 RX ORDER — LEVOTHYROXINE SODIUM 0.07 MG/1
75 TABLET ORAL DAILY
Status: DISCONTINUED | OUTPATIENT
Start: 2019-04-12 | End: 2019-04-12

## 2019-04-12 RX ORDER — FLUOXETINE HYDROCHLORIDE 20 MG/1
20 CAPSULE ORAL NIGHTLY
Status: DISCONTINUED | OUTPATIENT
Start: 2019-04-12 | End: 2019-04-17 | Stop reason: HOSPADM

## 2019-04-12 RX ORDER — ONDANSETRON 2 MG/ML
4 INJECTION INTRAMUSCULAR; INTRAVENOUS EVERY 6 HOURS PRN
Status: DISCONTINUED | OUTPATIENT
Start: 2019-04-12 | End: 2019-04-17 | Stop reason: HOSPADM

## 2019-04-12 RX ORDER — SPIRONOLACTONE 50 MG/1
50 TABLET, FILM COATED ORAL DAILY
Status: DISCONTINUED | OUTPATIENT
Start: 2019-04-12 | End: 2019-04-17 | Stop reason: HOSPADM

## 2019-04-12 RX ORDER — GABAPENTIN 300 MG/1
600 CAPSULE ORAL EVERY EVENING
Status: DISCONTINUED | OUTPATIENT
Start: 2019-04-12 | End: 2019-04-12

## 2019-04-12 RX ORDER — TAMSULOSIN HYDROCHLORIDE 0.4 MG/1
0.4 CAPSULE ORAL DAILY
Status: DISCONTINUED | OUTPATIENT
Start: 2019-04-12 | End: 2019-04-17 | Stop reason: HOSPADM

## 2019-04-12 RX ORDER — NICOTINE POLACRILEX 4 MG
15 LOZENGE BUCCAL PRN
Status: DISCONTINUED | OUTPATIENT
Start: 2019-04-12 | End: 2019-04-17 | Stop reason: HOSPADM

## 2019-04-12 RX ORDER — RANITIDINE 150 MG/1
150 TABLET ORAL DAILY
COMMUNITY

## 2019-04-12 RX ADMIN — LIDOCAINE: 50 OINTMENT TOPICAL at 12:33

## 2019-04-12 RX ADMIN — GUAIFENESIN 1200 MG: 600 TABLET, EXTENDED RELEASE ORAL at 12:28

## 2019-04-12 RX ADMIN — FAMOTIDINE 20 MG: 20 TABLET ORAL at 12:27

## 2019-04-12 RX ADMIN — IPRATROPIUM BROMIDE 0.5 MG: 0.5 SOLUTION RESPIRATORY (INHALATION) at 15:08

## 2019-04-12 RX ADMIN — LIDOCAINE: 50 OINTMENT TOPICAL at 20:58

## 2019-04-12 RX ADMIN — LEVOTHYROXINE SODIUM 75 MCG: 75 TABLET ORAL at 11:00

## 2019-04-12 RX ADMIN — FLUTICASONE PROPIONATE 2 SPRAY: 50 SPRAY, METERED NASAL at 12:33

## 2019-04-12 RX ADMIN — Medication 10 ML: at 08:00

## 2019-04-12 RX ADMIN — PERFLUTREN 1.65 MG: 6.52 INJECTION, SUSPENSION INTRAVENOUS at 16:00

## 2019-04-12 RX ADMIN — GABAPENTIN 400 MG: 400 CAPSULE ORAL at 17:32

## 2019-04-12 RX ADMIN — TAMSULOSIN HYDROCHLORIDE 0.4 MG: 0.4 CAPSULE ORAL at 12:26

## 2019-04-12 RX ADMIN — INSULIN LISPRO 1 UNITS: 100 INJECTION, SOLUTION INTRAVENOUS; SUBCUTANEOUS at 21:51

## 2019-04-12 RX ADMIN — INSULIN LISPRO 2 UNITS: 100 INJECTION, SOLUTION INTRAVENOUS; SUBCUTANEOUS at 12:33

## 2019-04-12 RX ADMIN — METOLAZONE 2.5 MG: 2.5 TABLET ORAL at 12:29

## 2019-04-12 RX ADMIN — LACTULOSE 20 G: 20 SOLUTION ORAL at 20:57

## 2019-04-12 RX ADMIN — FERROUS SULFATE TAB 325 MG (65 MG ELEMENTAL FE) 325 MG: 325 (65 FE) TAB at 12:28

## 2019-04-12 RX ADMIN — LEVOTHYROXINE SODIUM 50 MCG: 50 TABLET ORAL at 12:28

## 2019-04-12 RX ADMIN — ALLOPURINOL 100 MG: 100 TABLET ORAL at 20:57

## 2019-04-12 RX ADMIN — FOLIC ACID 1 MG: 1 TABLET ORAL at 12:27

## 2019-04-12 RX ADMIN — SPIRONOLACTONE 50 MG: 50 TABLET ORAL at 12:35

## 2019-04-12 RX ADMIN — ATORVASTATIN CALCIUM 40 MG: 40 TABLET, FILM COATED ORAL at 20:57

## 2019-04-12 RX ADMIN — WARFARIN SODIUM 5 MG: 5 TABLET ORAL at 17:32

## 2019-04-12 RX ADMIN — BUMETANIDE 2 MG: 1 TABLET ORAL at 12:28

## 2019-04-12 RX ADMIN — FLUOXETINE 20 MG: 20 CAPSULE ORAL at 20:57

## 2019-04-12 RX ADMIN — Medication 10 ML: at 20:58

## 2019-04-12 RX ADMIN — INSULIN GLARGINE 42 UNITS: 100 INJECTION, SOLUTION SUBCUTANEOUS at 20:57

## 2019-04-12 RX ADMIN — INSULIN LISPRO 3 UNITS: 100 INJECTION, SOLUTION INTRAVENOUS; SUBCUTANEOUS at 17:42

## 2019-04-12 RX ADMIN — IPRATROPIUM BROMIDE 0.5 MG: 0.5 SOLUTION RESPIRATORY (INHALATION) at 06:25

## 2019-04-12 RX ADMIN — LACTULOSE 20 G: 20 SOLUTION ORAL at 12:33

## 2019-04-12 RX ADMIN — IPRATROPIUM BROMIDE 0.5 MG: 0.5 SOLUTION RESPIRATORY (INHALATION) at 10:13

## 2019-04-12 RX ADMIN — FLUTICASONE PROPIONATE 2 SPRAY: 50 SPRAY, METERED NASAL at 20:56

## 2019-04-12 RX ADMIN — IPRATROPIUM BROMIDE 0.5 MG: 0.5 SOLUTION RESPIRATORY (INHALATION) at 18:28

## 2019-04-12 RX ADMIN — ALLOPURINOL 100 MG: 100 TABLET ORAL at 12:27

## 2019-04-12 RX ADMIN — Medication 9 MG: at 22:38

## 2019-04-12 ASSESSMENT — PAIN SCALES - GENERAL: PAINLEVEL_OUTOF10: 0

## 2019-04-12 NOTE — PROGRESS NOTES
4 Eyes Skin Assessment    Richard Hannon is being assessed upon: Admission    I agree that Skyler Mccarthy, along with Jose Chavira   (either 2 RN's or 1 LPN and 1 RN) have performed a thorough Head to Toe Skin Assessment on the patient. ALL assessment sites listed below have been assessed. Areas assessed by both nurses:     [x]   Head, Face, and Ears   [x]   Shoulders, Back, and Chest  [x]   Arms, Elbows, and Hands   [x]   Coccyx, Sacrum, and Ischium  [x]   Legs, Feet, and Heels    Does the Patient have Skin Breakdown?  No    Cristino Prevention initiated: Yes  Wound Care Orders initiated: No    [facility] nurse consulted for Pressure Injury (Stage 3,4, Unstageable, DTI, NWPT, and Complex wounds) and New or Established Ostomies: No        Primary Nurse eSignature: Bashir Campbell RN on 4/12/2019 at 7:54 AM      Co-Signer eSignature: Electronically signed by Jose Chavira RN on 4/12/19 at 7:55 AM

## 2019-04-12 NOTE — PROGRESS NOTES
Per Dr. Leyva Anchors, based on ABG's, OK to change from CPAP to BiPap with lowest amount of 02 possible for sat = or >92%. RT at bedside to change settings.

## 2019-04-12 NOTE — H&P
(post-traumatic stress disorder)      Past Psychiatric History:  As per above    Past Surgical History:   Procedure Laterality Date    AMPUTATION ABOVE KNEE Right 12/1/2017    RIGHT BELOW KNEE AMPUTATION performed by Yolanda Cuevas MD at [address], [address]  01/29/2015    23 mm mechanical valve, mitral valve annuloplasty repair w/30 mm Physio and tricuspid ring annuloplasty repair w/34 mm MC3 ring    CARDIAC CATHETERIZATION  12/11/[facility]  [facility]    EF60%    CARDIAC CATHETERIZATION  12/16/14  [facility]    right heart cath EF 60%    CORONARY ANGIOPLASTY WITH STENT PLACEMENT  12/18/14  [facility]    to RCA    LEG AMPUTATION BELOW KNEE Right 11/28/2017     RIGHT OPEN BELOW KNEE AMPUTATION performed by Yolanda Cuevas MD at [facility] OR     Social History     Tobacco History     Smoking Status  Never Smoker    Smokeless Tobacco Use  Never Used          Alcohol History     Alcohol Use Status  No          Drug Use     Drug Use Status  No          Sexual Activity     Sexually Active  Never              Family History   Problem Relation Age of Onset    Parkinsonism Mother     Heart Disease Father     COPD Father     COPD Sister     Heart Disease Brother      Allergies   Allergen Reactions    Cephalexin     Prazosin     Valproic Acid And Related      Current Facility-Administered Medications   Medication Dose Route Frequency Provider Last Rate Last Dose    sodium chloride flush 0.9 % injection 10 mL  10 mL Intravenous 2 times per day Deny Singh MD        sodium chloride flush 0.9 % injection 10 mL  10 mL Intravenous PRN Deny Singh MD        ondansetron [facility]) injection 4 mg  4 mg Intravenous Q6H PRN Deny Singh MD        polyethylene glycol [facility]) packet 17 g  17 g Oral Daily PRN Deny Singh MD        albuterol (PROVENTIL) nebulizer solution 2.5 mg  2.5 mg Nebulization Q4H PRN Deny Singh MD        nitroGLYCERIN (NITROSTAT) SL tablet 0.4 mg  0.4 mg Sublingual Q5 Min PRN Deny Singh MD  aspirin EC tablet 81 mg  81 mg Oral Daily Connie Waldron MD        allopurinol (ZYLOPRIM) tablet 100 mg  100 mg Oral BID Connie Waldron MD        atorvastatin (LIPITOR) tablet 40 mg  40 mg Oral Nightly Connie Waldron MD        bumetanide Beatricesumanth Izquierdo) tablet 2 mg  2 mg Oral Daily Connie Waldron MD        FLUoxetine (PROZAC) capsule 20 mg  20 mg Oral Nightly Connie Waldron MD        fluticasone Marv Sickle) 50 MCG/ACT nasal spray 2 spray  2 spray Nasal BID Connie Waldron MD        gabapentin (NEURONTIN) capsule 600 mg  600 mg Oral QPM MD [facility]) extended release tablet 1,200 mg  1,200 mg Oral BID PRN Connie aWldron MD        insulin glargine (LANTUS) injection vial 42 Units  42 Units Subcutaneous Nightly Connie Waldron MD        lactulose (CHRONULAC) 10 GM/15ML solution 20 g  30 mL Oral BID Connie Waldron MD        latanoprost (XALATAN) 0.005 % ophthalmic solution 1 drop  1 drop Both Eyes Nightly Connie Waldron MD        levothyroxine (SYNTHROID) tablet 75 mcg  75 mcg Oral Daily Connie Waldron MD        metolazone (ZAROXOLYN) tablet 2.5 mg  2.5 mg Oral Daily Connie Waldron MD        spironolactone (ALDACTONE) tablet 50 mg  50 mg Oral Daily Connie Waldron MD        tamsulosin [facility]) capsule 0.4 mg  0.4 mg Oral Daily Connie Waldron MD        traZODone (DESYREL) tablet 100 mg  100 mg Oral Nightly Connie Waldron MD        [START ON 4/13/2019] warfarin (COUMADIN) tablet 5 mg  5 mg Oral Daily Connie Waldron MD        ipratropium (ATROVENT) 0.02 % nebulizer solution 0.5 mg  0.5 mg Nebulization 4x daily Connie Waldron MD        warfarin (COUMADIN) daily dosing (placeholder)   Other Jie Osorio MD             OBJECTIVE:    Vitals:    04/12/19 0323   BP: (!) 109/51   Pulse: 77   Resp: 26     Physical Exam   Constitutional: He appears well-developed and well-nourished. No distress. HENT:   Head: Normocephalic and atraumatic.    Eyes: Right eye exhibits no discharge. Left eye exhibits no discharge. Neck: Neck supple. No tracheal deviation present. Cardiovascular: Normal rate and regular rhythm. Exam reveals no gallop and no friction rub. No murmur heard. Pulmonary/Chest: Effort normal and breath sounds normal. No stridor. No respiratory distress. He has no wheezes. He has no rhonchi. He has no rales. He exhibits no tenderness. Abdominal: Soft. Bowel sounds are normal. He exhibits no distension. There is no tenderness. There is no rebound and no guarding. Musculoskeletal: He exhibits tenderness. He exhibits no edema. Tender R BKA stump   Neurological: He is alert. Skin: Skin is warm and dry. He is not diaphoretic. Psychiatric: He has a normal mood and affect.  His behavior is normal.     LABS:  Pending results        ASESSMENTS & PLANS:    ADMIT: Cardiac ICU  ADMITTING DIAGNOSIS: Symptomatic Bradycardia  RELATED DIAGNOSIS: Supratheraputic INR  CONDITION: Fair  VITAL SIGNS: as per unit protocol  ACTIVITY: as tolerated  NURSING: as per unit protocol, telemetry  DIET: NPO except sips with meds and ice chips  IVF: not indicated  SPECIAL ORDERS: Cardio Consult for possible pacemaker  MEDICATIONS:  continue ASA EC 81mg PO QDay  Continue Lipitor 40mg PO QHS  Continue Bumex 2mg PO QDay  Continue Metolazone 2.5mg PO QDay  Continue Aldactone 50mg PO QDay  Continue Warfarin daily with holding parameter  Continue NG SL PRN as per CP protocol  ALLERGY ALERTS: as per EMR  LABS: CBC and CMP and Mag and TnI and TSH and Lipid Panel and HbA1c and PT/INR and PTT    Supportive and Prophylactic Txx:  DVT PPx: already on AC  GI (PUD) PPx: not indicated  PT: indicated as per age and critical illness      BPH:  Continue Flomax 0.4mg PO QDay    Insomnia:   Continue Trazodone 100mg PO QHS    COPD believed to be most likely due to agent orange:  Atrovent 4x per day    IDDM type 2:   Continue Lantus 42units SQ QHS  Continue Gabapentin 600mg PO QPM    Depression:  Continue Fluoxetine 20mg PO QHS    Cirrhosis believed to possibly be due to agent orange:  Continue Lactulose 30ml PO BiD    Gout:  Continue Allopurinol 100mg PO QDay    Glaucoma:  Continue Latanoprost 1 drop each eye QHS    Hypothyroidism:  Continue levothyroxine 75mcg PO QAC      Patient Active Problem List   Diagnosis    History of heart valve replacement with mechanical valve    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

## 2019-04-13 PROBLEM — J96.02 ACUTE RESPIRATORY FAILURE WITH HYPOXIA AND HYPERCAPNIA (HCC): Status: ACTIVE | Noted: 2019-04-13

## 2019-04-13 PROBLEM — N17.9 AKI (ACUTE KIDNEY INJURY) (HCC): Status: ACTIVE | Noted: 2019-04-13

## 2019-04-13 PROBLEM — J96.01 ACUTE RESPIRATORY FAILURE WITH HYPOXIA AND HYPERCAPNIA (HCC): Status: ACTIVE | Noted: 2019-04-13

## 2019-04-13 PROBLEM — G93.41 METABOLIC ENCEPHALOPATHY: Status: ACTIVE | Noted: 2019-04-13

## 2019-04-13 LAB
AMMONIA: 34 UMOL/L (ref 16–60)
ANION GAP SERPL CALCULATED.3IONS-SCNC: 11 MMOL/L (ref 7–19)
BASE EXCESS VENOUS: 12 MMOL/L
BUN BLDV-MCNC: 72 MG/DL (ref 8–23)
CALCIUM SERPL-MCNC: 8.8 MG/DL (ref 8.8–10.2)
CARBOXYHEMOGLOBIN: 3.4 %
CHLORIDE BLD-SCNC: 97 MMOL/L (ref 98–111)
CO2: 32 MMOL/L (ref 22–29)
CREAT SERPL-MCNC: 1.5 MG/DL (ref 0.5–1.2)
GFR NON-AFRICAN AMERICAN: 46
GLUCOSE BLD-MCNC: 175 MG/DL (ref 70–99)
GLUCOSE BLD-MCNC: 199 MG/DL (ref 70–99)
GLUCOSE BLD-MCNC: 206 MG/DL (ref 70–99)
GLUCOSE BLD-MCNC: 221 MG/DL (ref 74–109)
GLUCOSE BLD-MCNC: 257 MG/DL (ref 70–99)
HCO3 VENOUS: 39 MMOL/L (ref 23–29)
METHEMOGLOBIN VENOUS: 1.1 %
MRSA CULTURE ONLY: NORMAL
O2 CONTENT, VEN: 12 ML/DL
O2 SAT, VEN: 83 %
PCO2, VEN: 65 MMHG (ref 40–50)
PERFORMED ON: ABNORMAL
PH VENOUS: 7.39 (ref 7.35–7.45)
PO2, VEN: 48 MMHG
POTASSIUM REFLEX MAGNESIUM: 4.4 MMOL/L (ref 3.5–5)
SODIUM BLD-SCNC: 140 MMOL/L (ref 136–145)
TSH REFLEX FT4: 2.7 UIU/ML (ref 0.35–5.5)

## 2019-04-13 PROCEDURE — 99233 SBSQ HOSP IP/OBS HIGH 50: CPT | Performed by: INTERNAL MEDICINE

## 2019-04-13 PROCEDURE — 6360000002 HC RX W HCPCS: Performed by: HOSPITALIST

## 2019-04-13 PROCEDURE — 36415 COLL VENOUS BLD VENIPUNCTURE: CPT

## 2019-04-13 PROCEDURE — 93005 ELECTROCARDIOGRAM TRACING: CPT

## 2019-04-13 PROCEDURE — 6370000000 HC RX 637 (ALT 250 FOR IP): Performed by: HOSPITALIST

## 2019-04-13 PROCEDURE — 6370000000 HC RX 637 (ALT 250 FOR IP): Performed by: INTERNAL MEDICINE

## 2019-04-13 PROCEDURE — 2700000000 HC OXYGEN THERAPY PER DAY

## 2019-04-13 PROCEDURE — 82140 ASSAY OF AMMONIA: CPT

## 2019-04-13 PROCEDURE — 2140000000 HC CCU INTERMEDIATE R&B

## 2019-04-13 PROCEDURE — 82803 BLOOD GASES ANY COMBINATION: CPT

## 2019-04-13 PROCEDURE — 2580000003 HC RX 258: Performed by: HOSPITALIST

## 2019-04-13 PROCEDURE — 94660 CPAP INITIATION&MGMT: CPT

## 2019-04-13 PROCEDURE — 82948 REAGENT STRIP/BLOOD GLUCOSE: CPT

## 2019-04-13 PROCEDURE — 94640 AIRWAY INHALATION TREATMENT: CPT

## 2019-04-13 PROCEDURE — 80048 BASIC METABOLIC PNL TOTAL CA: CPT

## 2019-04-13 PROCEDURE — 84443 ASSAY THYROID STIM HORMONE: CPT

## 2019-04-13 RX ADMIN — IPRATROPIUM BROMIDE 0.5 MG: 0.5 SOLUTION RESPIRATORY (INHALATION) at 14:24

## 2019-04-13 RX ADMIN — FLUTICASONE PROPIONATE 2 SPRAY: 50 SPRAY, METERED NASAL at 11:38

## 2019-04-13 RX ADMIN — FERROUS SULFATE TAB 325 MG (65 MG ELEMENTAL FE) 325 MG: 325 (65 FE) TAB at 11:38

## 2019-04-13 RX ADMIN — INSULIN LISPRO 2 UNITS: 100 INJECTION, SOLUTION INTRAVENOUS; SUBCUTANEOUS at 17:59

## 2019-04-13 RX ADMIN — Medication 10 ML: at 21:00

## 2019-04-13 RX ADMIN — IPRATROPIUM BROMIDE 0.5 MG: 0.5 SOLUTION RESPIRATORY (INHALATION) at 06:23

## 2019-04-13 RX ADMIN — INSULIN LISPRO 5 UNITS: 100 INJECTION, SOLUTION INTRAVENOUS; SUBCUTANEOUS at 12:51

## 2019-04-13 RX ADMIN — INSULIN LISPRO 3 UNITS: 100 INJECTION, SOLUTION INTRAVENOUS; SUBCUTANEOUS at 12:51

## 2019-04-13 RX ADMIN — INSULIN GLARGINE 15 UNITS: 100 INJECTION, SOLUTION SUBCUTANEOUS at 21:00

## 2019-04-13 RX ADMIN — FOLIC ACID 1 MG: 1 TABLET ORAL at 11:38

## 2019-04-13 RX ADMIN — LEVOTHYROXINE SODIUM 50 MCG: 50 TABLET ORAL at 08:00

## 2019-04-13 RX ADMIN — BUMETANIDE 2 MG: 1 TABLET ORAL at 10:13

## 2019-04-13 RX ADMIN — INSULIN LISPRO 1 UNITS: 100 INJECTION, SOLUTION INTRAVENOUS; SUBCUTANEOUS at 08:56

## 2019-04-13 RX ADMIN — TAMSULOSIN HYDROCHLORIDE 0.4 MG: 0.4 CAPSULE ORAL at 10:13

## 2019-04-13 RX ADMIN — IPRATROPIUM BROMIDE 0.5 MG: 0.5 SOLUTION RESPIRATORY (INHALATION) at 10:24

## 2019-04-13 RX ADMIN — FAMOTIDINE 20 MG: 20 TABLET ORAL at 10:13

## 2019-04-13 RX ADMIN — LACTULOSE 20 G: 20 SOLUTION ORAL at 21:00

## 2019-04-13 RX ADMIN — INSULIN LISPRO 5 UNITS: 100 INJECTION, SOLUTION INTRAVENOUS; SUBCUTANEOUS at 17:58

## 2019-04-13 RX ADMIN — Medication 10 ML: at 08:00

## 2019-04-13 RX ADMIN — GABAPENTIN 400 MG: 400 CAPSULE ORAL at 17:57

## 2019-04-13 RX ADMIN — LIDOCAINE: 50 OINTMENT TOPICAL at 22:13

## 2019-04-13 RX ADMIN — WARFARIN SODIUM 5 MG: 5 TABLET ORAL at 17:57

## 2019-04-13 RX ADMIN — ONDANSETRON 4 MG: 2 INJECTION INTRAMUSCULAR; INTRAVENOUS at 11:34

## 2019-04-13 RX ADMIN — ALLOPURINOL 100 MG: 100 TABLET ORAL at 10:13

## 2019-04-13 RX ADMIN — ATORVASTATIN CALCIUM 40 MG: 40 TABLET, FILM COATED ORAL at 22:13

## 2019-04-13 RX ADMIN — ALLOPURINOL 100 MG: 100 TABLET ORAL at 21:00

## 2019-04-13 RX ADMIN — INSULIN LISPRO 5 UNITS: 100 INJECTION, SOLUTION INTRAVENOUS; SUBCUTANEOUS at 08:56

## 2019-04-13 RX ADMIN — Medication 9 MG: at 22:05

## 2019-04-13 RX ADMIN — IPRATROPIUM BROMIDE 0.5 MG: 0.5 SOLUTION RESPIRATORY (INHALATION) at 18:34

## 2019-04-13 RX ADMIN — FLUOXETINE 20 MG: 20 CAPSULE ORAL at 22:13

## 2019-04-13 RX ADMIN — SPIRONOLACTONE 50 MG: 50 TABLET ORAL at 10:13

## 2019-04-13 RX ADMIN — FLUTICASONE PROPIONATE 2 SPRAY: 50 SPRAY, METERED NASAL at 21:00

## 2019-04-13 RX ADMIN — METOLAZONE 2.5 MG: 2.5 TABLET ORAL at 10:13

## 2019-04-13 ASSESSMENT — PAIN SCALES - GENERAL
PAINLEVEL_OUTOF10: 0
PAINLEVEL_OUTOF10: 0

## 2019-04-13 NOTE — CONSULTS
[facility]       Cardiology Consultation          I personally saw the patient and rounded with:  Biju Becerra RN Nurse Navigator RN, on  4/12/19      The observations documented in this note, including the assessment and plan are mine              Date of Admission:  4/12/2019  2:47 AM    Date of Initially Being Seen / Consultation:  4/12/19    Cardiologist:  Shaneka Bustos MD     Cardiology Attending: [facility] Attending: Hospitalist Service      PCP:  ALEXANDRA Perales CNP    Reason for Consultation or Admission / Chief Complaint:  Atrial fib and dyspnea    SUBJECTIVE AND HISTORY OF PRESENT ILLNESS:    Source of the history:  Patient, family, previous inpatient and outpatient records in [place] is a 77 y.o. male who presents to [facility] CCU # [phone] with symptoms / signs / problem or diagnosis of progressive shortness of air. He also has bradycardia. He was confused. Known AF. The shortness of air has not been associated with symptoms of chest discomfort, pain with breathing, dizzness, fainting, cough, wheezing, hemoptysis, neck pain, chest injury, fever or sputum production. When being examined this evening, he has had no symptoms of exertional chest discomfort, unusual or change in shortness of air, presyncope or syncope.        Family present:  Yes: ? wife      CARDIAC RISK PROFILE:    Risk Factor Yes / No / Unknown       Gender Male   Cigarette Use No   Family History of Cardiovascular Disease Yes: heart disease   Diabetes Mellitus yes   Hypercholesteremia yes   Hypertension yes          Cardiac Specific Problems:    Specialty Problems        Cardiology Problems    Essential hypertension                PRIOR CARDIAC PROBLEM LIST  (IF APPLICABLE):    13/91/3661  Cath  Normal LVFX, 30 mmHg gradient across the AoV, severe RCA and LM disease  12/16/2014  Cath IVUS to the left main, 35% reduction in the intraluminal diameter  12/18/2014  Cath sent to proximal RCA, PTCA to distal RCA  01/29/2015  23 mm mechanical valve, mitral valve annuloplasty repair w/30 mm Physio and tricuspid ring annuloplasty repair w/34 mm MC3 ring  12/11/2017  Echo  Normal LVFX, normal AoV  4/13/2019  Echo  Mitral stenosis, PA 87 mmHg, AUC indication 70, AUC score 7      Past Medical History:    Past Medical History:   Diagnosis Date    Acute kidney failure (HCC)     Benign prostatic hyperplasia without urinary obstruction     CAD (coronary artery disease)     HFpEF NHYA Class 3, Stage C    CHF (congestive heart failure) (HCC)     history of    CHF (congestive heart failure) (HCC)     Chronic kidney disease (CKD)     Cirrhosis of liver (HCC)     Ascites/ followed by [facility]    COPD (chronic obstructive pulmonary disease) ([address]     Diabetes mellitus ([address]     Type 2    Emphysema lung ([address]     Exposure to Agent Orange     Glaucoma     Gout     Heart failure, systolic, chronic (HCC)     EF 30% as per transferring doc    History of obstructive sleep apnea     Hyperlipidemia     Hypertension     Hypothyroidism     Insomnia     Muscle weakness     Myocardial infarction ([address] 12/2014    s/p RCA stent 12/2014    Occlusion and stenosis of left carotid artery     Personal history of pulmonary embolism 10/2014    Psychiatric problem     depression    PTSD (post-traumatic stress disorder)          Past Surgical History:    Past Surgical History:   Procedure Laterality Date    AMPUTATION ABOVE KNEE Right 12/1/2017    RIGHT BELOW KNEE AMPUTATION performed by Sussy Jose MD at [address] [place] [address], [address]  01/29/2015    23 mm mechanical valve, mitral valve annuloplasty repair w/30 mm Physio and tricuspid ring annuloplasty repair w/34 mm MC3 ring    [facility]    EF60%    CARDIAC CATHETERIZATION  12/16/14  [facility]    right heart cath EF 60%    CORONARY ANGIOPLASTY WITH STENT PLACEMENT  12/18/14  [facility]    to RCA    LEG  5' 11\" (1.803 m)   Wt 169 lb 1.6 oz (76.7 kg)   SpO2 95%   BMI 23.58 kg/m²     GENERAL - well developed and well nourished, in mild amount of generalized distress; is an active participant in this examination  HEENT -  PERRLA, Hearing appears normal, conjunctiva and lids are normal, ears and nose appear normal  NECK - no thyromegaly, no JVD, trachea is in the midline  CARDIOVASCULAR - PMI is in the left mid line clavicular position, Variable S1, normal S2, without obvious murmur or gallop   PULMONARY - No respiratory distress. scattered wheezes and rales.   Breath sounds in both  lung fields are Decreased  ABDOMEN  - soft, non tender, no rebound, no hepatomegaly or splenomegaly  MUSCULOSKELETAL  - Prone/Supine, digitals and nails are without clubbing or cyanosis  EXTREMITIES - trace edema, right leg amputation  NEUROLOGIC - cranial nerves, II-XII, are normal  SKIN - turgor is normal, no rash  PSYCHIATRIC - normal mood and affect, alert and orientated x 3, judgement and insight appear appropriate      LABORATORY EVALUATION & TESTING:    I have personally reviewed and interpreted the results of the following diagnostic testing      EKG and or Telemetry:  which was personally reviewed me:  Atrial fibrillation rhythm,   Pulse Readings from Last 1 Encounters:   04/13/19 95    bpm,  without Acute changes    Troponin:  negative myocardial necrosis (  <0.01); the creatinine is abnormal 1.8    CBC:   Recent Labs     04/12/19 0356   WBC 3.1*   HGB 9.4*   HCT 33.1*   MCV 89.0        BMP:   Recent Labs     04/12/19  0356 04/12/19  1753 04/13/19  0136     --  140   K 5.4* 4.9 4.4   CL 95*  --  97*   CO2 29  --  32*   BUN 69*  --  72*   CREATININE 1.8*  --  1.5*     Cardiac Enzymes:   Recent Labs     04/12/19  0356 04/12/19  0624   TROPONINI <0.01 <0.01     PT/INR:   Recent Labs     04/12/19 0356   PROTIME 29.2*   INR 2.86*     APTT:   Recent Labs     04/12/19 0356   APTT 51.8*     Liver Profile:  Lab Results   Component Value Date    AST 18 04/12/2019    ALT 13 04/12/2019    BILITOT 0.3 04/12/2019    ALKPHOS 107 04/12/2019     Lab Results   Component Value Date    CHOL 89 04/12/2019    HDL 38 04/12/2019    TRIG 70 04/12/2019     TSH:  Lab Results   Component Value Date    TSH 2.42 11/28/2014     UA:   Lab Results   Component Value Date    COLORU DK YELLOW 12/06/2017    PHUR 5.5 12/06/2017    WBCUA 0-1 12/06/2017    RBCUA 21 12/11/2014    MUCUS RARE 11/27/2014    BACTERIA trace 12/06/2017    CLARITYU Clear 12/06/2017    SPECGRAV 1.024 12/06/2017    LEUKOCYTESUR Negative 12/06/2017    UROBILINOGEN 0.2 12/06/2017    BILIRUBINUR Negative 12/06/2017    BLOODU Negative 12/06/2017    GLUCOSEU 500 12/06/2017    AMORPHOUS 1+ 12/06/2017             ALL THE CARDIOLOGY PROBLEMS ARE LISTED ABOVE; HOWEVER, THE FOLLOWING SPECIFIC CARDIAC PROBLEMS WERE ADDRESSED AND TREATED DURING THE HOSPITAL VISIT TODAY:                                                                                                                                                                                                                                            MEDICAL DECISION MAKING             Cardiac Specific Problem / Diagnosis  Discussion and Data Reviewed Diagnostic Procedures Ordered Management Options Selected           1.  Presenting problem / symptom  Progressive shortness of air  are worsening   Multifactorial etiology Yes: echo Continue current medications:     Yes:            2. Prior cardiac history of valvular heart disease Initial presentation during this evaluation   Review and summation of records:    12/11/2014  Cath  Normal LVFX, 30 mmHg gradient across the AoV, severe RCA and LM disease  12/16/2014  Cath IVUS to the left main, 35% reduction in the intraluminal diameter  12/18/2014  Cath sent to proximal RCA, PTCA to distal RCA  01/29/2015  23 mm mechanical valve, mitral valve annuloplasty repair w/30 mm Physio and tricuspid ring

## 2019-04-13 NOTE — PROGRESS NOTES
Yoana Hannon received from CCU to room # 730 . Mental Status: Patient is oriented and alert. Vitals:    04/13/19 1534   BP: 125/61   Pulse: 95   Resp: 18   Temp: 97.1 °F (36.2 °C)   SpO2: 95%     Placed on cardiac monitor: Yes. Box # 730. Belongings: None with patient at bedside . Family at bedside Yes. Oriented Patient and Family to room. Call light within reach. Yes. Transfer was: Well tolerated by patient. .    Electronically signed by Juliet Joe RN on 4/13/2019 at 5:04 PM

## 2019-04-13 NOTE — PROGRESS NOTES
[address]    Daily HOSPITAL Progress Note                            Date:  4/13/19  Patient: Mecca Putnam  Admission:  4/12/2019  2:47 AM  Admit DX: Symptomatic bradycardia [R00.1]  Age:  77 y. o., 1950     LOS: 1 day           I personally saw the patient and rounded with:  Colette Cheadle RN on 4/13/19      The observations documented in this note, including the assessment and plan are mine         Reason for initial evaluation or the patient's initial complaint    Dyspnea and bradycardia      SUBJECTIVE:      4/12/2019    Chief Complaint / Reason for the Visit   Follow up of:  dyspnea and bradycardia and systemic arterial hypertension    Family present and in room during examination:  No      Specialty Problems        Cardiology Problems    Essential hypertension              Current Status Today According to the patient:  \"ok\"    Subjective:  Mr. Mecca Putnam is generally feeling unchanged. Echo with AS and MS with pulmonary hypertension    Mr. Mecca Putnam has the following cardiac complaints / symptoms today:    1. dyspnea, multifactorial as above    2. AS and MS, will need cath    3.  Hypertension    The blood pressure for the lastr 36 hours has been:  Systolic (39AJN), XZU:887 , Min:93 , AZF:470    Diastolic (02ZYE), WCD:15, Min:44, Max:101        Mecca Putnam is a 77 y.o. male with the following history as recorded in EpicCare:    Patient Active Problem List    Diagnosis Date Noted    S/P AVR      Priority: High    MARYLIN (acute kidney injury) ([address] 04/13/2019     Priority: Low    Acute respiratory failure with hypoxia and hypercapnia ([address] 04/13/2019     Priority: Low    Metabolic encephalopathy 20/96/6712     Priority: Low    Symptomatic bradycardia 04/12/2019     Priority: Low    Exposure to [employer]      Priority: Low    BKA stump complication ([address] 61/25/4042     Priority: Low    Cirrhosis of liver with ascites (HCC)      Priority: Low    S/P BKA Oral Nightly Nevelyn Burkitt, MD   20 mg at 04/12/19 2057    fluticasone (FLONASE) 50 MCG/ACT nasal spray 2 spray  2 spray Nasal BID Nevelyn Burkitt, MD   2 spray at 04/13/19 1138    guaiFENesin [facility]) extended release tablet 1,200 mg  1,200 mg Oral BID PRN Nevelyn Burkitt, MD   1,200 mg at 04/12/19 1228    insulin glargine (LANTUS) injection vial 42 Units  42 Units Subcutaneous Nightly Nevelyn Burkitt, MD   42 Units at 04/12/19 2057    lactulose ([facility]) 10 GM/15ML solution 20 g  30 mL Oral BID Nevelyn Burkitt, MD   Stopped at 04/13/19 1014    metolazone (ZAROXOLYN) tablet 2.5 mg  2.5 mg Oral Daily Nevelyn Burkitt, MD   2.5 mg at 04/13/19 1013    spironolactone (ALDACTONE) tablet 50 mg  50 mg Oral Daily Nevelyn Burkitt, MD   50 mg at 04/13/19 1013    tamsulosin (FLOMAX) capsule 0.4 mg  0.4 mg Oral Daily Nevelyn Burkitt, MD   0.4 mg at 04/13/19 1013    ipratropium (ATROVENT) 0.02 % nebulizer solution 0.5 mg  0.5 mg Nebulization 4x daily Nevelyn Burkitt, MD   0.5 mg at 04/13/19 1424    warfarin (COUMADIN) daily dosing (placeholder)   Other RX Malcolm Merlin, MD        warfarin (COUMADIN) tablet 5 mg  5 mg Oral Daily Nevelyn Burkitt, MD   5 mg at 04/13/19 1757    melatonin tablet 9 mg  9 mg Oral Nightly PRN Nevelyn Burkitt, MD   9 mg at 04/12/19 2238    famotidine (PEPCID) tablet 20 mg  20 mg Oral Daily Nevelyn Burkitt, MD   20 mg at 04/13/19 1013    lidocaine (XYLOCAINE) 5 % ointment   Topical 4x Daily PRN Nevelyn Burkitt, MD        levothyroxine (SYNTHROID) tablet 50 mcg  50 mcg Oral Daily Nevelyn Burkitt, MD   50 mcg at 76/01/11 8403    folic acid (FOLVITE) tablet 1 mg  1 mg Oral Daily Nevelyn Burkitt, MD   1 mg at 04/13/19 1138    ferrous sulfate tablet 325 mg  325 mg Oral Daily with breakfast Nevelyn Burkitt, MD   325 mg at 04/13/19 1138    gabapentin (NEURONTIN) capsule 400 mg  400 mg Oral QPM Nevelyn Burkitt, MD   400 mg at 04/13/19 1757    glucose (GLUTOSE) 40 % oral gel 15 g  15 g Oral PRN Marina Pacheco, MD        dextrose 50 % solution 12.5 g  12.5 g Intravenous PRN Joaquim Reynoso MD        glucagon (rDNA) injection 1 mg  1 mg Intramuscular PRN Joaquim Reynoso MD        dextrose 5 % solution  100 mL/hr Intravenous PRN Joaquim Reynoso MD        insulin lispro (HUMALOG) injection vial 0-6 Units  0-6 Units Subcutaneous TID WC Joaquim Reynoso MD   2 Units at 04/13/19 1759    insulin lispro (HUMALOG) injection vial 0-3 Units  0-3 Units Subcutaneous Nightly Joaquim Reynoso MD   1 Units at 04/12/19 2151    latanoprost (XALATAN) 0.005 % ophthalmic solution 1 drop  1 drop Both Eyes Daily Mango Marshall MD         Allergies: Cephalexin; Prazosin; and Valproic acid and related  Past Medical History:   Diagnosis Date    [facility]     Benign prostatic hyperplasia without urinary obstruction     CAD (coronary artery disease)     HFpEF NHYA Class 3, Stage C    CHF (congestive heart failure) ([facility]     history of    CHF (congestive heart failure) ([address]     Chronic kidney disease (CKD)     Cirrhosis of liver ([address]     Ascites/ followed by [facility]    COPD (chronic obstructive pulmonary disease) ([facility]     Diabetes mellitus ([address]     Type 2    Emphysema lung ([address]     Exposure to Agent Orange     Glaucoma     Gout     Heart failure, systolic, chronic (HCC)     EF 30% as per transferring doc    History of obstructive sleep apnea     Hyperlipidemia     Hypertension     Hypothyroidism     Insomnia     Muscle weakness     Myocardial infarction ([address] 12/2014    s/p RCA stent 12/2014    Occlusion and stenosis of left carotid artery     Personal history of pulmonary embolism 10/2014    Psychiatric problem     depression    PTSD (post-traumatic stress disorder)      Past Surgical History:   Procedure Laterality Date    AMPUTATION ABOVE KNEE Right 12/1/2017    RIGHT BELOW KNEE AMPUTATION performed by Gail Krause MD at [address] [facility] [address], [address] gallop   PULMONARY - Yes: mild respiratory distress. scattered wheezes and rales. Breath sounds in both  lung fields are Decreased  ABDOMEN  - soft, non tender, no rebound, no hepatomegaly or splenomegaly  MUSCULOSKELETAL  - Prone/Supine, digitals and nails are without clubbing or cyanosis  EXTREMITIES - no edema, right leg amputation  NEUROLOGIC - cranial nerves, II-XII, are normal  SKIN - turgor is normal, no rash  PSYCHIATRIC - normal mood and affect, alert and orientated x 3, judgement and insight appear appropriate      ASSESSMENT:    ALL THE CARDIOLOGY PROBLEMS ARE LISTED ABOVE; HOWEVER, THE FOLLOWING SPECIFIC CARDIAC PROBLEMS / CONDITIONS WERE ADDRESSED AND TREATED DURING THE OFFICE VISIT TODAY:                                                                                            MEDICAL DECISION MAKING             Cardiac Specific Problem / Diagnosis  Discussion and Data Reviewed Diagnostic Procedures Ordered Management Options Selected           1.  Presenting problem / symptom  Progressive shortness of air  are worsening   Multifactorial etiology Yes: echo Continue current medications:     Yes:            2. Prior cardiac history of valvular heart disease Initial presentation during this evaluation   Review and summation of records:    12/11/2014  Cath  Normal LVFX, 30 mmHg gradient across the AoV, severe RCA and LM disease  12/16/2014  Cath IVUS to the left main, 35% reduction in the intraluminal diameter  12/18/2014  Cath sent to proximal RCA, PTCA to distal RCA  01/29/2015  23 mm mechanical valve, mitral valve annuloplasty repair w/30 mm Physio and tricuspid ring annuloplasty repair w/34 mm MC3 ring  12/11/2017  Echo  Normal LVFX, normal AoV       Yes: echo Continue current medications:    Yes:            3. Systemic arterial hypertension Initial presentation during this evaluation The blood pressure for the lastr 36 hours has been:  Systolic (30GJX), UJH:979 , Min:93 , HYU:414    Diastolic (36hrs), Av, Min:44, Max:101      No Continue current medications:       yes                 CONSIDERATIONS, THOUGHTS, AND PLANS:    1. Continue present medications except for changes as noted above  2. Continue to monitor rhythm  3. Further orders per clinical course. Date of the Proposed Procedure:  2019    Proposed Procedure:  Right and left cardiac catheterization with coronary arteriography and ascending aortography    :  CECILIA Arroyo MD    Indications:  2019  Echo  Mitral stenosis, PA 87 mmHg, AUC indication 70, AUC score 7     American Society of Anesthesiologists (ASA) Classification:  III    Plan of Sedation:  Moderate Sedation    Mallampati Classification:  II    I have examined this patient on 19  in CCU # 735 in the presence of Renetta Deleon RN and find no interval changes since the original History and Physical / Consult as noted written by myself, on 2019    With the constellation of symptoms and these findings, I recommend cardiac catheterization and possibility of percutaneous coronary intervention. I discussed with him  in detail  the risks, benefits and alternatives to this procedure. The risks mentioned to him include but are not limited to:  vascular complications in ~ 3%, stroke <1%, renal dysfunction <5%, myocardial infarction <1%, coronary dissection <1%, need for emergency open heart surgery <1, and death <1% . He appeared to understand, had no questions, and agreed to proceed with this plan. Additionally, there are risks associated with moderate sedation which includes transient drop in blood pressure and need for assisted ventilation. This procedure is scheduled for tomorrow.     Kinza Deleon MD

## 2019-04-13 NOTE — PROGRESS NOTES
Discussed VBG results with Dr. Ana Almodovar, states it is okay to leave the patient off bipap at this time. SpO2 stable on nasal cannula, patient states he feels good this morning.  Electronically signed by Tirso Dahl RN on 4/13/2019 at 6:30 AM

## 2019-04-13 NOTE — PROGRESS NOTES
Hospitalist Progress Note  4/13/2019 7:32 AM  Subjective:   Admit Date: 4/12/2019  PCP: ALEXANDRA Carrizales - JULIUS  [id number]       Chief Complaint: AMS    Subjective: No acute events overnight. Pt less confused today. Pt reports some difficulties with bipap overnight, but denies any current SOB. Pt does have some abdominal distention and flatus. Pt with BM 4/12 due to lactulose. Otherwise, no new complaints. Denies CP, SOB, abd pain, N/V, F/C.     ROS:    14 point review of systems is negative except as specifically addressed above.     Medications:   dextrose       Current Facility-Administered Medications   Medication Dose Route Frequency Provider Last Rate Last Dose    sodium chloride flush 0.9 % injection 10 mL  10 mL Intravenous 2 times per day Mango Marshall MD   10 mL at 04/12/19 2058    sodium chloride flush 0.9 % injection 10 mL  10 mL Intravenous PRN Mango Marshall MD        ondansetron [facility]) injection 4 mg  4 mg Intravenous Q6H PRN Mango Marshall MD        polyethylene glycol [facility]) packet 17 g  17 g Oral Daily PRN Mango Marshall MD   Stopped at 04/12/19 1226    albuterol (PROVENTIL) nebulizer solution 2.5 mg  2.5 mg Nebulization Q4H PRN Mango Marshall MD        nitroGLYCERIN (NITROSTAT) SL tablet 0.4 mg  0.4 mg Sublingual Q5 Min PRN Mango Marshall MD        aspirin EC tablet 81 mg  81 mg Oral Daily Mango Marshall MD   Stopped at 04/12/19 1245    allopurinol (ZYLOPRIM) tablet 100 mg  100 mg Oral BID Mango Marshall MD   100 mg at 04/12/19 2057    atorvastatin (LIPITOR) tablet 40 mg  40 mg Oral Nightly Mango Marshall MD   40 mg at 04/12/19 2057    bumetanide (BUMEX) tablet 2 mg  2 mg Oral Daily Mango Marshall MD   2 mg at 04/12/19 1228    FLUoxetine (PROZAC) capsule 20 mg  20 mg Oral Nightly Mango Marshall MD   20 mg at 04/12/19 2057    fluticasone (FLONASE) 50 MCG/ACT nasal spray 2 spray  2 spray Nasal BID Mango Marshall MD   2 spray at 04/12/19 2056    guaiFENesin ([address]) 100 mL/hr Intravenous PRN Ben Jara MD        insulin lispro (HUMALOG) injection vial 0-6 Units  0-6 Units Subcutaneous TID WC Ben Jara MD   3 Units at 04/12/19 1742    insulin lispro (HUMALOG) injection vial 0-3 Units  0-3 Units Subcutaneous Nightly Ben Jara MD   1 Units at 04/12/19 2151    perflutren lipid microspheres (DEFINITY) injection 1.65 mg  1.5 mL Intravenous ONCE PRN Kinza Deleon MD   1.65 mg at 04/12/19 1600    latanoprost (XALATAN) 0.005 % ophthalmic solution 1 drop  1 drop Both Eyes Daily Narendra Waterman MD            Objective:   Vitals: BP (!) 93/53   Pulse 85   Temp 98.2 °F (36.8 °C) (Temporal)   Resp 25   Ht 5' 11\" (1.803 m)   Wt 175 lb 9.6 oz (79.7 kg)   SpO2 96%   BMI 24.49 kg/m²   24HR INTAKE/OUTPUT:      Intake/Output Summary (Last 24 hours) at 4/13/2019 0732  Last data filed at 4/13/2019 0000  Gross per 24 hour   Intake 485 ml   Output 900 ml   Net -415 ml     DIET CARDIAC; Carb Control: 4 carb choices (60 gms)/meal; No Added Salt (3-4 GM); No Caffeine    General appearance: alert, cooperative and no distress  Head: Normocephalic, without obvious abnormality, atraumatic  Eyes: conjunctivae/corneas clear. PERRL, EOM's intact. Ears: normal external ears and nose  Neck: supple, symmetrical, trachea midline   Lungs: clear to auscultation bilaterally,no rales or wheezes   Heart: Irregularly irregular rhythm, normal rate  Abdomen:soft, non-tender; distended, tympanic to percussion  Extremities:Pedal edema none, R BKA  Skin: Skin color, texture, turgor normal. No rashes or lesions  Neurologic: AAOx3, CAM -ve, some generalized weakness, but otherwise without any focal sensory/motor deficits.    Psychiatric: Alert and oriented, thought content appropriate, mood appropriate      Labs:     Recent Labs     04/12/19  0356   WBC 3.1*   RBC 3.72*   HGB 9.4*   HCT 33.1*   MCV 89.0   MCH 25.3*   MCHC 28.4*        Recent Labs     04/12/19  0356 04/12/19  2884 04/13/19  0136     --  140   K 5.4* 4.9 4.4   ANIONGAP 16  --  11   CL 95*  --  97*   CO2 29  --  32*   BUN 69*  --  72*   CREATININE 1.8*  --  1.5*   GLUCOSE 225*  --  221*   CALCIUM 8.9  --  8.8     Recent Labs     04/12/19  0356   MG 2.3     Recent Labs     04/12/19  0356   AST 18   ALT 13   BILITOT 0.3   ALKPHOS 107     @LABRCNT (ABG:3)@  HgBA1c:  No components found for: HGBA1C  FLP:    Lab Results   Component Value Date    TRIG 70 04/12/2019    HDL 38 04/12/2019    LDLCALC 37 04/12/2019     TSH:    Lab Results   Component Value Date    TSH 2.42 11/28/2014     Troponin T:   Recent Labs     04/12/19  0356 04/12/19  0624   TROPONINI <0.01 <0.01     INR:   Recent Labs     04/12/19  0356   INR 2.86*     Echo: pending      Impression / Plan:         Principal Problem:    Acute respiratory failure with hypoxia and hypercapnia (HCC)  Active Problems:    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 *    Plan:  -Pt initially p/w some confusion and possible syncopal episode per wife. Wife reported he has been progressively becoming more lethargic. Likely 2/2 metabolic encephalopathy with hypercapnia and hypoxia. Mental status improved today, appears at baseline. Will cont supportive care, NIV QHS, continuous O2. F/u TSH.    -Pt does have a hx of liver cirrhosis which puts hepatic encephalopathy on the differential. F/u ammonia lvl, cont lactulose, aldactone. -Afib, rate controlled, on warfarin for AC    -T2DM, with hyperglycemia. POCs for BG monitoring. Lantus QHS, increasing mealtime lispro, cont SSI.      Advance Directive: Full Code    Estevan Murdock MD  Internal Medicine Hospitalist

## 2019-04-13 NOTE — PROGRESS NOTES
Per Dr. Mariza Coello this am, ok for pt to move to PCU, but he does plan to do cardiac catheterization at some point this admission. Pt refused lactulose this morning, stating that he knows his body and that he almost never got rid of the gas and bloating last night. When offered miralax, as he needs to have BM for ammonia and distention, he refuses that as well, stating that he knows his body. Vital signs stable; will continue to monitor.

## 2019-04-14 LAB
ANION GAP SERPL CALCULATED.3IONS-SCNC: 10 MMOL/L (ref 7–19)
BASOPHILS ABSOLUTE: 0 K/UL (ref 0–0.2)
BASOPHILS RELATIVE PERCENT: 0.7 % (ref 0–1)
BUN BLDV-MCNC: 57 MG/DL (ref 8–23)
CALCIUM SERPL-MCNC: 8.9 MG/DL (ref 8.8–10.2)
CHLORIDE BLD-SCNC: 94 MMOL/L (ref 98–111)
CO2: 37 MMOL/L (ref 22–29)
CREAT SERPL-MCNC: 1.2 MG/DL (ref 0.5–1.2)
EOSINOPHILS ABSOLUTE: 0.1 K/UL (ref 0–0.6)
EOSINOPHILS RELATIVE PERCENT: 2 % (ref 0–5)
GFR NON-AFRICAN AMERICAN: 60
GLUCOSE BLD-MCNC: 204 MG/DL (ref 70–99)
GLUCOSE BLD-MCNC: 231 MG/DL (ref 70–99)
GLUCOSE BLD-MCNC: 238 MG/DL (ref 70–99)
GLUCOSE BLD-MCNC: 241 MG/DL (ref 74–109)
GLUCOSE BLD-MCNC: 258 MG/DL (ref 70–99)
HCT VFR BLD CALC: 32.7 % (ref 42–52)
HEMOGLOBIN: 9.1 G/DL (ref 14–18)
HYPOCHROMIA: ABNORMAL
INR BLD: 2.45 (ref 0.88–1.18)
LYMPHOCYTES ABSOLUTE: 0.4 K/UL (ref 1.1–4.5)
LYMPHOCYTES RELATIVE PERCENT: 8.1 % (ref 20–40)
MCH RBC QN AUTO: 24.9 PG (ref 27–31)
MCHC RBC AUTO-ENTMCNC: 27.8 G/DL (ref 33–37)
MCV RBC AUTO: 89.3 FL (ref 80–94)
MONOCYTES ABSOLUTE: 0.4 K/UL (ref 0–0.9)
MONOCYTES RELATIVE PERCENT: 8.5 % (ref 0–10)
NEUTROPHILS ABSOLUTE: 3.6 K/UL (ref 1.5–7.5)
NEUTROPHILS RELATIVE PERCENT: 80.3 % (ref 50–65)
PDW BLD-RTO: 16.6 % (ref 11.5–14.5)
PERFORMED ON: ABNORMAL
PLATELET # BLD: 151 K/UL (ref 130–400)
PLATELET SLIDE REVIEW: ADEQUATE
PMV BLD AUTO: 10 FL (ref 9.4–12.4)
POLYCHROMASIA: ABNORMAL
POTASSIUM SERPL-SCNC: 4.8 MMOL/L (ref 3.5–5)
PROTHROMBIN TIME: 25.8 SEC (ref 12–14.6)
RBC # BLD: 3.66 M/UL (ref 4.7–6.1)
SODIUM BLD-SCNC: 141 MMOL/L (ref 136–145)
WBC # BLD: 4.5 K/UL (ref 4.8–10.8)

## 2019-04-14 PROCEDURE — 6360000002 HC RX W HCPCS: Performed by: INTERNAL MEDICINE

## 2019-04-14 PROCEDURE — 85025 COMPLETE CBC W/AUTO DIFF WBC: CPT

## 2019-04-14 PROCEDURE — 82948 REAGENT STRIP/BLOOD GLUCOSE: CPT

## 2019-04-14 PROCEDURE — 36415 COLL VENOUS BLD VENIPUNCTURE: CPT

## 2019-04-14 PROCEDURE — 94640 AIRWAY INHALATION TREATMENT: CPT

## 2019-04-14 PROCEDURE — 2700000000 HC OXYGEN THERAPY PER DAY

## 2019-04-14 PROCEDURE — 80048 BASIC METABOLIC PNL TOTAL CA: CPT

## 2019-04-14 PROCEDURE — 99232 SBSQ HOSP IP/OBS MODERATE 35: CPT | Performed by: INTERNAL MEDICINE

## 2019-04-14 PROCEDURE — 94660 CPAP INITIATION&MGMT: CPT

## 2019-04-14 PROCEDURE — 6370000000 HC RX 637 (ALT 250 FOR IP): Performed by: INTERNAL MEDICINE

## 2019-04-14 PROCEDURE — 99233 SBSQ HOSP IP/OBS HIGH 50: CPT | Performed by: INTERNAL MEDICINE

## 2019-04-14 PROCEDURE — 6370000000 HC RX 637 (ALT 250 FOR IP): Performed by: HOSPITALIST

## 2019-04-14 PROCEDURE — 93005 ELECTROCARDIOGRAM TRACING: CPT

## 2019-04-14 PROCEDURE — 85610 PROTHROMBIN TIME: CPT

## 2019-04-14 PROCEDURE — 6360000002 HC RX W HCPCS: Performed by: HOSPITALIST

## 2019-04-14 PROCEDURE — 2580000003 HC RX 258: Performed by: HOSPITALIST

## 2019-04-14 PROCEDURE — 2140000000 HC CCU INTERMEDIATE R&B

## 2019-04-14 RX ORDER — CIPROFLOXACIN 2 MG/ML
400 INJECTION, SOLUTION INTRAVENOUS EVERY 12 HOURS
Status: DISCONTINUED | OUTPATIENT
Start: 2019-04-14 | End: 2019-04-16

## 2019-04-14 RX ADMIN — FOLIC ACID 1 MG: 1 TABLET ORAL at 09:07

## 2019-04-14 RX ADMIN — INSULIN LISPRO 5 UNITS: 100 INJECTION, SOLUTION INTRAVENOUS; SUBCUTANEOUS at 13:38

## 2019-04-14 RX ADMIN — INSULIN LISPRO 2 UNITS: 100 INJECTION, SOLUTION INTRAVENOUS; SUBCUTANEOUS at 09:17

## 2019-04-14 RX ADMIN — ATORVASTATIN CALCIUM 40 MG: 40 TABLET, FILM COATED ORAL at 21:02

## 2019-04-14 RX ADMIN — IPRATROPIUM BROMIDE 0.5 MG: 0.5 SOLUTION RESPIRATORY (INHALATION) at 07:11

## 2019-04-14 RX ADMIN — ASPIRIN 81 MG: 81 TABLET, COATED ORAL at 09:07

## 2019-04-14 RX ADMIN — INSULIN GLARGINE 21 UNITS: 100 INJECTION, SOLUTION SUBCUTANEOUS at 21:00

## 2019-04-14 RX ADMIN — GABAPENTIN 400 MG: 400 CAPSULE ORAL at 17:36

## 2019-04-14 RX ADMIN — INSULIN LISPRO 2 UNITS: 100 INJECTION, SOLUTION INTRAVENOUS; SUBCUTANEOUS at 17:41

## 2019-04-14 RX ADMIN — WARFARIN SODIUM 5 MG: 5 TABLET ORAL at 17:36

## 2019-04-14 RX ADMIN — INSULIN LISPRO 5 UNITS: 100 INJECTION, SOLUTION INTRAVENOUS; SUBCUTANEOUS at 09:13

## 2019-04-14 RX ADMIN — FAMOTIDINE 20 MG: 20 TABLET ORAL at 09:07

## 2019-04-14 RX ADMIN — INSULIN LISPRO 5 UNITS: 100 INJECTION, SOLUTION INTRAVENOUS; SUBCUTANEOUS at 17:38

## 2019-04-14 RX ADMIN — CIPROFLOXACIN 400 MG: 2 INJECTION, SOLUTION INTRAVENOUS at 21:28

## 2019-04-14 RX ADMIN — Medication 10 ML: at 09:10

## 2019-04-14 RX ADMIN — ALLOPURINOL 100 MG: 100 TABLET ORAL at 21:02

## 2019-04-14 RX ADMIN — LATANOPROST 1 DROP: 50 SOLUTION OPHTHALMIC at 09:09

## 2019-04-14 RX ADMIN — FLUOXETINE 20 MG: 20 CAPSULE ORAL at 21:02

## 2019-04-14 RX ADMIN — IPRATROPIUM BROMIDE 0.5 MG: 0.5 SOLUTION RESPIRATORY (INHALATION) at 19:34

## 2019-04-14 RX ADMIN — IPRATROPIUM BROMIDE 0.5 MG: 0.5 SOLUTION RESPIRATORY (INHALATION) at 15:02

## 2019-04-14 RX ADMIN — INSULIN LISPRO 2 UNITS: 100 INJECTION, SOLUTION INTRAVENOUS; SUBCUTANEOUS at 13:35

## 2019-04-14 RX ADMIN — LEVOTHYROXINE SODIUM 50 MCG: 50 TABLET ORAL at 09:07

## 2019-04-14 RX ADMIN — INSULIN LISPRO 1 UNITS: 100 INJECTION, SOLUTION INTRAVENOUS; SUBCUTANEOUS at 21:01

## 2019-04-14 RX ADMIN — BUMETANIDE 2 MG: 1 TABLET ORAL at 09:07

## 2019-04-14 RX ADMIN — FLUTICASONE PROPIONATE 2 SPRAY: 50 SPRAY, METERED NASAL at 09:07

## 2019-04-14 RX ADMIN — TAMSULOSIN HYDROCHLORIDE 0.4 MG: 0.4 CAPSULE ORAL at 09:06

## 2019-04-14 RX ADMIN — METOLAZONE 2.5 MG: 2.5 TABLET ORAL at 09:07

## 2019-04-14 RX ADMIN — SPIRONOLACTONE 50 MG: 50 TABLET ORAL at 09:07

## 2019-04-14 RX ADMIN — FERROUS SULFATE TAB 325 MG (65 MG ELEMENTAL FE) 325 MG: 325 (65 FE) TAB at 09:07

## 2019-04-14 RX ADMIN — IPRATROPIUM BROMIDE 0.5 MG: 0.5 SOLUTION RESPIRATORY (INHALATION) at 11:03

## 2019-04-14 RX ADMIN — Medication 10 ML: at 21:02

## 2019-04-14 RX ADMIN — ALLOPURINOL 100 MG: 100 TABLET ORAL at 09:07

## 2019-04-14 NOTE — PROGRESS NOTES
Hospitalist Progress Note  4/14/2019 11:37 AM  Subjective:   Admit Date: 4/12/2019  PCP: Adilene Brown, APRN - CNP  [id number]       Chief Complaint: AMS    Subjective: No acute events overnight. Pt with mild abd pain, but denies any current SOB. Otherwise, no new complaints. Denies CP, SOB, abd pain, N/V, F/C. Wife at bedside reports mental status near baseline. ROS:    14 point review of systems is negative except as specifically addressed above.     Medications:   dextrose       Current Facility-Administered Medications   Medication Dose Route Frequency Provider Last Rate Last Dose    insulin lispro (HUMALOG) injection vial 5 Units  5 Units Subcutaneous TID BARBARA Del Rio MD   5 Units at 04/14/19 0913    sodium chloride flush 0.9 % injection 10 mL  10 mL Intravenous 2 times per day Keshia Galeana MD   10 mL at 04/14/19 0910    sodium chloride flush 0.9 % injection 10 mL  10 mL Intravenous PRN Keshia Galeana MD        [facility]ron [facility]) [facility] 4 mg  4 mg Intravenous Q6H PRN Keshia Galeana MD   4 mg at 04/13/19 1134    polyethylene glycol (GLYCOLAX) packet 17 g  17 g Oral Daily PRN Keshia Galeana MD   Stopped at 04/12/19 1226    albuterol (PROVENTIL) nebulizer solution 2.5 mg  2.5 mg Nebulization Q4H PRN Keshia Galeana MD        nitroGLYCERIN (NITROSTAT) SL tablet 0.4 mg  0.4 mg Sublingual Q5 Min PRN Keshia Galeana MD        aspirin EC tablet 81 mg  81 mg Oral Daily Keshia Galeana MD   81 mg at 04/14/19 2062    allopurinol (ZYLOPRIM) tablet 100 mg  100 mg Oral BID Keshia Galeana MD   100 mg at 04/14/19 8166    atorvastatin (LIPITOR) tablet 40 mg  40 mg Oral Nightly Keshia Galeana MD   40 mg at 04/13/19 2213    bumetanide (BUMEX) tablet 2 mg  2 mg Oral Daily Keshia Galeana MD   2 mg at 04/14/19 9595    FLUoxetine (PROZAC) capsule 20 mg  20 mg Oral Nightly Keshia Galeana MD   20 mg at 04/13/19 2213    fluticasone (FLONASE) 50 MCG/ACT nasal spray 2 spray  2 spray Nasal BID Jona Palmer Merry Wong MD   2 spray at 04/14/19 0907    guaiFENesin [facility]) extended release tablet 1,200 mg  1,200 mg Oral BID PRN Je Zuniga MD   1,200 mg at 04/12/19 1228    insulin glargine (LANTUS) injection vial 42 Units  42 Units Subcutaneous Nightly Je Zuniga MD   15 Units at 04/13/19 2100    lactulose ([employer] [employer]) 10 GM/15ML solution 20 g  30 mL Oral BID Je Zuniga MD   20 g at 04/13/19 2100    metolazone (ZAROXOLYN) tablet 2.5 mg  2.5 mg Oral Daily Je Zuniga MD   2.5 mg at 04/14/19 4512    spironolactone (ALDACTONE) tablet 50 mg  50 mg Oral Daily Je Zuniga MD   50 mg at 04/14/19 0213    tamsulosin (FLOMAX) capsule 0.4 mg  0.4 mg Oral Daily Je Zuniga MD   0.4 mg at 04/14/19 6428    ipratropium (ATROVENT) 0.02 % nebulizer solution 0.5 mg  0.5 mg Nebulization 4x daily Je Zuniga MD   0.5 mg at 04/14/19 1103    warfarin (COUMADIN) daily dosing (placeholder)   Other RX Placeholder Je Zuniga MD        warfarin (COUMADIN) tablet 5 mg  5 mg Oral Daily Je Zuniga MD   5 mg at 04/13/19 1757    melatonin tablet 9 mg  9 mg Oral Nightly PRN Je Zuniga MD   9 mg at 04/13/19 2205    famotidine (PEPCID) tablet 20 mg  20 mg Oral Daily Je Zuniga MD   20 mg at 04/14/19 8454    lidocaine (XYLOCAINE) 5 % ointment   Topical 4x Daily PRN Je Zuniga MD        levothyroxine (SYNTHROID) tablet 50 mcg  50 mcg Oral Daily Je Zuniga MD   50 mcg at 60/51/26 2622    folic acid (FOLVITE) tablet 1 mg  1 mg Oral Daily Je Zuniga MD   1 mg at 04/14/19 7023    ferrous sulfate tablet 325 mg  325 mg Oral Daily with breakfast Je Zuniga MD   325 mg at 04/14/19 1358    gabapentin (NEURONTIN) capsule 400 mg  400 mg Oral QPM Je Zuniga MD   400 mg at 04/13/19 1757    glucose (GLUTOSE) 40 % oral gel 15 g  15 g Oral PRN Jc Rodriguez MD        dextrose 50 % solution 12.5 g  12.5 g Intravenous PRN Jc Rodriguez MD        glucagon (rDNA) injection 1 mg  1 mg Intramuscular PRN Leatha Haynes MD        dextrose 5 % solution  100 mL/hr Intravenous PRN Leatha Haynes MD        insulin lispro (HUMALOG) injection vial 0-6 Units  0-6 Units Subcutaneous TID WC Leatha Haynes MD   2 Units at 04/14/19 0917    insulin lispro (HUMALOG) injection vial 0-3 Units  0-3 Units Subcutaneous Nightly Leatha Haynes MD   1 Units at 04/12/19 2151    latanoprost (XALATAN) 0.005 % ophthalmic solution 1 drop  1 drop Both Eyes Daily Courtney Fleming MD   1 drop at 04/14/19 0909        Objective:   Vitals: /66   Pulse 99   Temp 98.2 °F (36.8 °C) (Temporal)   Resp 18   Ht 5' 11\" (1.803 m)   Wt 169 lb 3.2 oz (76.7 kg)   SpO2 100%   BMI 23.60 kg/m²   24HR INTAKE/OUTPUT:      Intake/Output Summary (Last 24 hours) at 4/14/2019 1137  Last data filed at 4/14/2019 0901  Gross per 24 hour   Intake 560 ml   Output 2075 ml   Net -1515 ml     DIET CARDIAC; Carb Control: 4 carb choices (60 gms)/meal; No Added Salt (3-4 GM); No Caffeine    General appearance: alert, cooperative and no distress  Head: Normocephalic, without obvious abnormality, atraumatic  Eyes: conjunctivae/corneas clear. PERRL, EOM's intact. Ears: normal external ears and nose  Neck: supple, symmetrical, trachea midline   Lungs: clear to auscultation bilaterally,no rales or wheezes   Heart: Irregularly irregular rhythm, normal rate  Abdomen:soft, non-tender; distended, tympanic to percussion  Extremities:Pedal edema none, R BKA  Skin: Skin color, texture, turgor normal. No rashes or lesions  Neurologic: AAOx3, some generalized weakness, but otherwise without any focal sensory/motor deficits.    Psychiatric: Alert and oriented, thought content appropriate, mood appropriate      Labs:     Recent Labs     04/12/19  0356 04/14/19  1145   WBC 3.1* 4.5*   RBC 3.72* 3.66*   HGB 9.4* 9.1*   HCT 33.1* 32.7*   MCV 89.0 89.3   MCH 25.3* 24.9*   MCHC 28.4* 27.8*    151     Recent Labs     04/12/19  0357 04/12/19  1753 04/13/19  0136     --  140   K 5.4* 4.9 4.4   ANIONGAP 16  --  11   CL 95*  --  97*   CO2 29  --  32*   BUN 69*  --  72*   CREATININE 1.8*  --  1.5*   GLUCOSE 225*  --  221*   CALCIUM 8.9  --  8.8     Recent Labs     04/12/19  0356   MG 2.3     Recent Labs     04/12/19  0356   AST 18   ALT 13   BILITOT 0.3   ALKPHOS 107     @LABRCNT (ABG:3)@  HgBA1c:  No components found for: HGBA1C  FLP:    Lab Results   Component Value Date    TRIG 70 04/12/2019    HDL 38 04/12/2019    LDLCALC 37 04/12/2019     TSH:    Lab Results   Component Value Date    TSH 2.42 11/28/2014     Troponin T:   Recent Labs     04/12/19  0356 04/12/19  0624   TROPONINI <0.01 <0.01     INR:   Recent Labs     04/12/19  0356   INR 2.86*     Urine CX 4/11 (From [facility])  Proteus Mirabilis >100,000  Resistances to Tetracycline and Nitrofurantoin reported    Echo: 4/13  Summary   Prosthetic valve in the aortic position.   Trace aortic regurgitation.   Severely dilated right ventricular size with mildly reduced RV function.   Right ventricular systolic pressure is noted at 87 mmHg.      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 *    Plan:  -Pt initially p/w some confusion and possible syncopal episode per wife. Echo as above, Cardiology recommending cardiac cath, plan for 3/25.     -Metabolic encephalopathy with hypercapnia and hypoxia. Mental status improved with NIV. Remains at baseline.  Will cont supportive care, NIV QHS, continuous O2.     -Hx of liver cirrhosis, ammonia not elevated, less likely hepatic encephalopathy. Cont maintenance lactulose, aldactone. -Micro report from OSH found Blood cultures negative x 2, but did report Proteus in urine. Will start abx.     -Afib, rate controlled, on warfarin for AC    -T2DM, with hyperglycemia. POCs for BG monitoring. Lantus QHS, increasing mealtime lispro, cont SSI.      Advance Directive: Full Code    Maira Moreno MD  Internal Medicine Hospitalist

## 2019-04-14 NOTE — PROGRESS NOTES
knee amputation), right ([facility] 12/06/2017     Priority: Low    Acute blood loss as cause of postoperative anemia      Priority: Low    Type 2 diabetes mellitus with skin complication ([facility] 98/28/7534     Priority: Low    Acquired hypothyroidism      Priority: Low    Essential hypertension      Priority: Low    Mixed hyperlipidemia      Priority: Low    Glaucoma      Priority: Low    Panlobular emphysema (HCC)      Priority: Low    Stage 3 chronic kidney disease ([facility]      Priority: Low    Cirrhosis of liver (HCC)      Priority: Low    History of heart valve replacement with mechanical valve      Priority: Low     Current Facility-Administered Medications   Medication Dose Route Frequency Provider Last Rate Last Dose    ciprofloxacin (CIPRO) IVPB 400 mg  400 mg Intravenous Q12H Alvaro Maya MD   Stopped at 04/14/19 1541    insulin lispro (HUMALOG) injection vial 5 Units  5 Units Subcutaneous TID WC Alvaro Maya MD   5 Units at 04/14/19 1738    sodium chloride flush 0.9 % injection 10 mL  10 mL Intravenous 2 times per day Ana Maria Caballero MD   10 mL at 04/14/19 0910    sodium chloride flush 0.9 % injection 10 mL  10 mL Intravenous PRN Ana Maria Caballero MD        [facility]) injection 4 mg  4 mg Intravenous Q6H PRN Ana Maria Caballero MD   4 mg at 04/13/19 1134    polyethylene glycol (GLYCOLAX) packet 17 g  17 g Oral Daily PRN Ana Maria Caballero MD   Stopped at 04/12/19 1226    albuterol (PROVENTIL) nebulizer solution 2.5 mg  2.5 mg Nebulization Q4H PRN Ana Maria Caballero MD        nitroGLYCERIN (NITROSTAT) SL tablet 0.4 mg  0.4 mg Sublingual Q5 Min PRN Ana Maria Caballero MD        aspirin EC tablet 81 mg  81 mg Oral Daily Ana Maria Caballero MD   81 mg at 04/14/19 7852    allopurinol (ZYLOPRIM) tablet 100 mg  100 mg Oral BID Ana Maria Caballero MD   100 mg at 04/14/19 8862    atorvastatin (LIPITOR) tablet 40 mg  40 mg Oral Nightly Ana Maria Caballero MD   40 mg at 04/13/19 2213    bumetanide (BUMEX) tablet 2 mg 400 mg at 04/14/19 1736    glucose (GLUTOSE) 40 % oral gel 15 g  15 g Oral PRN Joaquim Reynoso MD        dextrose 50 % solution 12.5 g  12.5 g Intravenous PRN Joaquim Reynoso MD        glucagon (rDNA) injection 1 mg  1 mg Intramuscular PRN Joaquim Reynoso MD        dextrose 5 % solution  100 mL/hr Intravenous PRN Joaquim Reynoso MD        insulin lispro (HUMALOG) injection vial 0-6 Units  0-6 Units Subcutaneous TID WC Joaquim Reynoso MD   2 Units at 04/14/19 1741    insulin lispro (HUMALOG) injection vial 0-3 Units  0-3 Units Subcutaneous Nightly Joaquim Reynoso MD   1 Units at 04/12/19 2151    latanoprost (XALATAN) 0.005 % ophthalmic solution 1 drop  1 drop Both Eyes Daily Mango Marshall MD   1 drop at 04/14/19 0909     Allergies: Cephalexin; Prazosin; and Valproic acid and related  Past Medical History:   Diagnosis Date    Acute [facility] failure ([facility] [address]     Benign prostatic hyperplasia without urinary obstruction     CAD (coronary artery disease)     HFpEF NHYA Class 3, Stage C    CHF (congestive heart failure) ([facility] [address]     history of    CHF (congestive heart failure) ([address]     Chronic kidney disease (CKD)     Cirrhosis of liver ([address]     Ascites/ [facility] by [facility]    COPD (chronic obstructive pulmonary disease) ([facility] [address]     Diabetes mellitus ([address]     Type 2    Emphysema lung ([address]     Exposure to Agent Orange     Glaucoma     Gout     Heart failure, systolic, chronic (HCC)     EF 30% as per transferring doc    History of obstructive sleep apnea     Hyperlipidemia     Hypertension     Hypothyroidism     Insomnia     Muscle weakness     Myocardial infarction ([address] 12/2014    s/p RCA stent 12/2014    Occlusion and stenosis of left carotid artery     Personal history of pulmonary embolism 10/2014    Psychiatric problem     depression    PTSD (post-traumatic stress disorder)      Past Surgical History:   Procedure Laterality Date    AMPUTATION ABOVE KNEE Right midline  CARDIOVASCULAR - PMI is in the left mid line clavicular position, Normal S1 and S2 with a grade 2/6 systolic murmur. No S3 or S4    PULMONARY - No respiratory distress. scattered wheezes and rales. Breath sounds in both  lung fields are Decreased  ABDOMEN  - soft, non tender, no rebound, no hepatomegaly or splenomegaly  MUSCULOSKELETAL  - Prone/Supine, digitals and nails are without clubbing or cyanosis  EXTREMITIES - trace edema  NEUROLOGIC - cranial nerves, II-XII, are normal  SKIN - turgor is normal, no rash  PSYCHIATRIC - normal mood and affect, alert and orientated x 3, judgement and insight appear appropriate      ASSESSMENT:    ALL THE CARDIOLOGY PROBLEMS ARE LISTED ABOVE; HOWEVER, THE FOLLOWING SPECIFIC CARDIAC PROBLEMS / CONDITIONS WERE ADDRESSED AND TREATED DURING THE OFFICE VISIT TODAY:                                                                                            MEDICAL DECISION MAKING                   Cardiac Specific Problem / Diagnosis   Discussion and Data Reviewed Diagnostic Procedures Ordered Management Options Selected                 1. Presenting problem / symptom  Progressive shortness of air  are worsening    Multifactorial etiology Yes: echo Continue current medications:      Yes:                  2. Prior cardiac history of valvular heart disease Initial presentation during this evaluation    Review and summation of records:     12/11/2014  Cath  Normal LVFX, 30 mmHg gradient across the AoV, severe RCA and LM disease  12/16/2014  Cath IVUS to the left main, 35% reduction in the intraluminal diameter  12/18/2014  Cath sent to proximal RCA, PTCA to distal RCA  01/29/2015  23 mm mechanical valve, mitral valve annuloplasty repair w/30 mm Physio and tricuspid ring annuloplasty repair w/34 mm MC3 ring  12/11/2017  Echo  Normal LVFX, normal AoV          Yes: echo Continue current medications:     Yes:                  3.  Systemic arterial hypertension Initial presentation during this evaluation The blood pressure for the lastr 36 hours has been:  Systolic (83LHX), FSL:643 , Min:93 , YFT:681    Diastolic (93ENH), ZX, Min:48, Max:81          No Continue current medications:        yes                          CONSIDERATIONS, THOUGHTS, AND PLANS:     4. Continue present medications except for changes as noted above  5. Continue to monitor rhythm  6. Further orders per clinical course.                 Date of the Proposed Procedure:  4/15/2019     Proposed Procedure:  Right and left cardiac catheterization with coronary arteriography and ascending aortography     :  CECILIA Carey MD     Indications:  2019  Echo  Mitral stenosis, PA 87 mmHg, AUC indication 70, AUC score 7      American Society of Anesthesiologists (ASA) Classification:  III     Plan of Sedation:  Moderate Sedation     Mallampati Classification:  II     I have examined this patient on 19  in CCU # 735 in the presence of Siobhan Mayo RN and find no interval changes since the original History and Physical / Consult as noted written by myself, on 2019     With the constellation of symptoms and these findings, I recommend cardiac catheterization and possibility of percutaneous coronary intervention.     I discussed with him  in detail  the risks, benefits and alternatives to this procedure. The risks mentioned to him include but are not limited to:  vascular complications in ~ 3%, stroke <1%, renal dysfunction <5%, myocardial infarction <1%, coronary dissection <1%, need for emergency open heart surgery <1, and death <1% .   He appeared to understand, had no questions, and agreed to proceed with this plan.     Additionally, there are risks associated with moderate sedation which includes transient drop in blood pressure and need for assisted ventilation.     This procedure is scheduled for tomorrow.     Nilay Perez MD

## 2019-04-14 NOTE — PROGRESS NOTES
Sat dropped went to room patient has bipap apart states \"take this off\" refuses bipap.  4 L per NC sat 89 to 92%

## 2019-04-14 NOTE — PROGRESS NOTES
Patient desated to 77% oxygen at 3 L put bipap on at 4 L sat not coming up oxygen turned up to 10 L sat up to 90% called respiratory for assistance.

## 2019-04-15 ENCOUNTER — APPOINTMENT (OUTPATIENT)
Dept: CARDIAC CATH/INVASIVE PROCEDURES | Age: 69
DRG: 286 | End: 2019-04-15
Attending: HOSPITALIST
Payer: COMMERCIAL

## 2019-04-15 LAB
BASE EXCESS ARTERIAL: 18 (ref -3–3)
CALCIUM IONIZED: 1.07 MMOL/L (ref 1.1–1.3)
CO2: 46 MEQ/L (ref 21–32)
EKG P AXIS: NORMAL DEGREES
EKG P-R INTERVAL: NORMAL MS
EKG Q-T INTERVAL: 334 MS
EKG Q-T INTERVAL: 372 MS
EKG Q-T INTERVAL: 386 MS
EKG Q-T INTERVAL: 388 MS
EKG Q-T INTERVAL: 392 MS
EKG Q-T INTERVAL: 404 MS
EKG QRS DURATION: 104 MS
EKG QRS DURATION: 106 MS
EKG QRS DURATION: 90 MS
EKG QRS DURATION: 98 MS
EKG QTC CALCULATION (BAZETT): 397 MS
EKG QTC CALCULATION (BAZETT): 415 MS
EKG QTC CALCULATION (BAZETT): 419 MS
EKG QTC CALCULATION (BAZETT): 438 MS
EKG QTC CALCULATION (BAZETT): 439 MS
EKG QTC CALCULATION (BAZETT): 446 MS
EKG T AXIS: -116 DEGREES
EKG T AXIS: -118 DEGREES
EKG T AXIS: -140 DEGREES
EKG T AXIS: -146 DEGREES
EKG T AXIS: -154 DEGREES
EKG T AXIS: -162 DEGREES
GFR NON-AFRICAN AMERICAN: 50
GLUCOSE BLD-MCNC: 111 MG/DL (ref 70–99)
GLUCOSE BLD-MCNC: 111 MG/DL (ref 70–99)
GLUCOSE BLD-MCNC: 113 MG/DL (ref 70–99)
GLUCOSE BLD-MCNC: 125 MG/DL (ref 70–99)
GLUCOSE BLD-MCNC: 139 MG/DL (ref 70–99)
HCT VFR BLD CALC: 34.8 % (ref 42–52)
HEMOGLOBIN: 12.4 GM/DL (ref 12–18)
HEMOGLOBIN: 9.7 G/DL (ref 14–18)
INR BLD: 2.33 (ref 0.88–1.18)
LV EF: 50 %
LVEF MODALITY: NORMAL
MCH RBC QN AUTO: 24.6 PG (ref 27–31)
MCHC RBC AUTO-ENTMCNC: 27.9 G/DL (ref 33–37)
MCV RBC AUTO: 88.1 FL (ref 80–94)
O2 SAT, ARTERIAL: 97 % (ref 93–100)
PCO2 ARTERIAL: 71 MM HG (ref 35–48)
PDW BLD-RTO: 16.4 % (ref 11.5–14.5)
PERFORMED ON: ABNORMAL
PH ARTERIAL: 7.42 (ref 7.3–7.5)
PLATELET # BLD: 136 K/UL (ref 130–400)
PMV BLD AUTO: 9.8 FL (ref 9.4–12.4)
PO2 ARTERIAL: 96 MM HG (ref 83–108)
POC ANION GAP: 4
POC CHLORIDE: 93 MEQ/L (ref 99–110)
POC CREATININE: 1.4 MG/DL (ref 0.3–1.3)
POC HEMATOCRIT: 37 % (ref 37–52)
POC POTASSIUM: 4.2 MEQ/L (ref 3.5–5.1)
POC SAMPLE TYPE: ABNORMAL
POC SODIUM: 143 MEQ/L (ref 136–145)
POTASSIUM SERPL-SCNC: 4.5 MMOL/L (ref 3.5–5)
PROTHROMBIN TIME: 24.8 SEC (ref 12–14.6)
RBC # BLD: 3.95 M/UL (ref 4.7–6.1)
TCO2 ARTERIAL: 48 MMOL/L
WBC # BLD: 4.1 K/UL (ref 4.8–10.8)

## 2019-04-15 PROCEDURE — 82810 BLOOD GASES O2 SAT ONLY: CPT

## 2019-04-15 PROCEDURE — 82374 ASSAY BLOOD CARBON DIOXIDE: CPT

## 2019-04-15 PROCEDURE — 6360000004 HC RX CONTRAST MEDICATION: Performed by: INTERNAL MEDICINE

## 2019-04-15 PROCEDURE — 82565 ASSAY OF CREATININE: CPT

## 2019-04-15 PROCEDURE — 99232 SBSQ HOSP IP/OBS MODERATE 35: CPT | Performed by: INTERNAL MEDICINE

## 2019-04-15 PROCEDURE — 2580000003 HC RX 258: Performed by: INTERNAL MEDICINE

## 2019-04-15 PROCEDURE — 82800 BLOOD PH: CPT

## 2019-04-15 PROCEDURE — 6370000000 HC RX 637 (ALT 250 FOR IP): Performed by: INTERNAL MEDICINE

## 2019-04-15 PROCEDURE — 84295 ASSAY OF SERUM SODIUM: CPT

## 2019-04-15 PROCEDURE — 6360000002 HC RX W HCPCS: Performed by: INTERNAL MEDICINE

## 2019-04-15 PROCEDURE — 85014 HEMATOCRIT: CPT

## 2019-04-15 PROCEDURE — 97750 PHYSICAL PERFORMANCE TEST: CPT

## 2019-04-15 PROCEDURE — 82435 ASSAY OF BLOOD CHLORIDE: CPT

## 2019-04-15 PROCEDURE — 99152 MOD SED SAME PHYS/QHP 5/>YRS: CPT | Performed by: INTERNAL MEDICINE

## 2019-04-15 PROCEDURE — 84132 ASSAY OF SERUM POTASSIUM: CPT

## 2019-04-15 PROCEDURE — C1760 CLOSURE DEV, VASC: HCPCS

## 2019-04-15 PROCEDURE — 94640 AIRWAY INHALATION TREATMENT: CPT

## 2019-04-15 PROCEDURE — 93460 R&L HRT ART/VENTRICLE ANGIO: CPT | Performed by: INTERNAL MEDICINE

## 2019-04-15 PROCEDURE — 2700000000 HC OXYGEN THERAPY PER DAY

## 2019-04-15 PROCEDURE — 6370000000 HC RX 637 (ALT 250 FOR IP): Performed by: PHYSICIAN ASSISTANT

## 2019-04-15 PROCEDURE — B2151ZZ FLUOROSCOPY OF LEFT HEART USING LOW OSMOLAR CONTRAST: ICD-10-PCS | Performed by: INTERNAL MEDICINE

## 2019-04-15 PROCEDURE — 85027 COMPLETE CBC AUTOMATED: CPT

## 2019-04-15 PROCEDURE — C1894 INTRO/SHEATH, NON-LASER: HCPCS

## 2019-04-15 PROCEDURE — APPSS30 APP SPLIT SHARED TIME 16-30 MINUTES: Performed by: PHYSICIAN ASSISTANT

## 2019-04-15 PROCEDURE — 85610 PROTHROMBIN TIME: CPT

## 2019-04-15 PROCEDURE — B2111ZZ FLUOROSCOPY OF MULTIPLE CORONARY ARTERIES USING LOW OSMOLAR CONTRAST: ICD-10-PCS | Performed by: INTERNAL MEDICINE

## 2019-04-15 PROCEDURE — 99233 SBSQ HOSP IP/OBS HIGH 50: CPT | Performed by: INTERNAL MEDICINE

## 2019-04-15 PROCEDURE — 97161 PT EVAL LOW COMPLEX 20 MIN: CPT

## 2019-04-15 PROCEDURE — 36415 COLL VENOUS BLD VENIPUNCTURE: CPT

## 2019-04-15 PROCEDURE — 94660 CPAP INITIATION&MGMT: CPT

## 2019-04-15 PROCEDURE — 4A023N8 MEASUREMENT OF CARDIAC SAMPLING AND PRESSURE, BILATERAL, PERCUTANEOUS APPROACH: ICD-10-PCS | Performed by: INTERNAL MEDICINE

## 2019-04-15 PROCEDURE — C1751 CATH, INF, PER/CENT/MIDLINE: HCPCS

## 2019-04-15 PROCEDURE — B3101ZZ FLUOROSCOPY OF THORACIC AORTA USING LOW OSMOLAR CONTRAST: ICD-10-PCS | Performed by: INTERNAL MEDICINE

## 2019-04-15 PROCEDURE — B41F1ZZ FLUOROSCOPY OF RIGHT LOWER EXTREMITY ARTERIES USING LOW OSMOLAR CONTRAST: ICD-10-PCS | Performed by: INTERNAL MEDICINE

## 2019-04-15 PROCEDURE — 82330 ASSAY OF CALCIUM: CPT

## 2019-04-15 PROCEDURE — 2140000000 HC CCU INTERMEDIATE R&B

## 2019-04-15 PROCEDURE — 2709999900 HC NON-CHARGEABLE SUPPLY

## 2019-04-15 PROCEDURE — 6360000002 HC RX W HCPCS

## 2019-04-15 PROCEDURE — 82948 REAGENT STRIP/BLOOD GLUCOSE: CPT

## 2019-04-15 PROCEDURE — 93005 ELECTROCARDIOGRAM TRACING: CPT

## 2019-04-15 RX ORDER — SODIUM CHLORIDE 0.9 % (FLUSH) 0.9 %
10 SYRINGE (ML) INJECTION PRN
Status: DISCONTINUED | OUTPATIENT
Start: 2019-04-15 | End: 2019-04-17 | Stop reason: HOSPADM

## 2019-04-15 RX ORDER — SODIUM CHLORIDE 0.9 % (FLUSH) 0.9 %
10 SYRINGE (ML) INJECTION EVERY 12 HOURS SCHEDULED
Status: DISCONTINUED | OUTPATIENT
Start: 2019-04-15 | End: 2019-04-17 | Stop reason: HOSPADM

## 2019-04-15 RX ORDER — ACETAMINOPHEN 325 MG/1
650 TABLET ORAL EVERY 6 HOURS PRN
Status: DISCONTINUED | OUTPATIENT
Start: 2019-04-15 | End: 2019-04-15

## 2019-04-15 RX ORDER — ACETAMINOPHEN 325 MG/1
650 TABLET ORAL EVERY 4 HOURS PRN
Status: DISCONTINUED | OUTPATIENT
Start: 2019-04-15 | End: 2019-04-17 | Stop reason: HOSPADM

## 2019-04-15 RX ADMIN — SPIRONOLACTONE 50 MG: 50 TABLET ORAL at 09:36

## 2019-04-15 RX ADMIN — METOLAZONE 2.5 MG: 2.5 TABLET ORAL at 09:36

## 2019-04-15 RX ADMIN — WARFARIN SODIUM 7.5 MG: 5 TABLET ORAL at 22:25

## 2019-04-15 RX ADMIN — FERROUS SULFATE TAB 325 MG (65 MG ELEMENTAL FE) 325 MG: 325 (65 FE) TAB at 09:36

## 2019-04-15 RX ADMIN — TAMSULOSIN HYDROCHLORIDE 0.4 MG: 0.4 CAPSULE ORAL at 09:36

## 2019-04-15 RX ADMIN — INSULIN GLARGINE 15 UNITS: 100 INJECTION, SOLUTION SUBCUTANEOUS at 22:06

## 2019-04-15 RX ADMIN — FAMOTIDINE 20 MG: 20 TABLET ORAL at 09:36

## 2019-04-15 RX ADMIN — IPRATROPIUM BROMIDE 0.5 MG: 0.5 SOLUTION RESPIRATORY (INHALATION) at 18:54

## 2019-04-15 RX ADMIN — CIPROFLOXACIN 400 MG: 2 INJECTION, SOLUTION INTRAVENOUS at 09:37

## 2019-04-15 RX ADMIN — IOPAMIDOL 160 ML: 612 INJECTION, SOLUTION INTRAVENOUS at 21:57

## 2019-04-15 RX ADMIN — ALLOPURINOL 100 MG: 100 TABLET ORAL at 22:02

## 2019-04-15 RX ADMIN — BUMETANIDE 2 MG: 1 TABLET ORAL at 09:36

## 2019-04-15 RX ADMIN — CIPROFLOXACIN 400 MG: 2 INJECTION, SOLUTION INTRAVENOUS at 22:06

## 2019-04-15 RX ADMIN — IPRATROPIUM BROMIDE 0.5 MG: 0.5 SOLUTION RESPIRATORY (INHALATION) at 14:59

## 2019-04-15 RX ADMIN — FOLIC ACID 1 MG: 1 TABLET ORAL at 09:36

## 2019-04-15 RX ADMIN — Medication 10 ML: at 21:57

## 2019-04-15 RX ADMIN — ACETAMINOPHEN 650 MG: 325 TABLET, FILM COATED ORAL at 02:43

## 2019-04-15 RX ADMIN — ATORVASTATIN CALCIUM 40 MG: 40 TABLET, FILM COATED ORAL at 22:02

## 2019-04-15 RX ADMIN — Medication 10 ML: at 09:36

## 2019-04-15 RX ADMIN — FLUTICASONE PROPIONATE 2 SPRAY: 50 SPRAY, METERED NASAL at 21:50

## 2019-04-15 RX ADMIN — ALLOPURINOL 100 MG: 100 TABLET ORAL at 09:36

## 2019-04-15 RX ADMIN — Medication 10 ML: at 22:51

## 2019-04-15 RX ADMIN — FLUOXETINE 20 MG: 20 CAPSULE ORAL at 22:02

## 2019-04-15 RX ADMIN — ASPIRIN 81 MG: 81 TABLET, COATED ORAL at 09:36

## 2019-04-15 RX ADMIN — IPRATROPIUM BROMIDE 0.5 MG: 0.5 SOLUTION RESPIRATORY (INHALATION) at 07:59

## 2019-04-15 RX ADMIN — GABAPENTIN 400 MG: 400 CAPSULE ORAL at 22:02

## 2019-04-15 RX ADMIN — LEVOTHYROXINE SODIUM 50 MCG: 50 TABLET ORAL at 05:55

## 2019-04-15 RX ADMIN — IPRATROPIUM BROMIDE 0.5 MG: 0.5 SOLUTION RESPIRATORY (INHALATION) at 10:37

## 2019-04-15 RX ADMIN — ACETAMINOPHEN 650 MG: 325 TABLET, FILM COATED ORAL at 22:24

## 2019-04-15 ASSESSMENT — PAIN SCALES - GENERAL
PAINLEVEL_OUTOF10: 6
PAINLEVEL_OUTOF10: 0
PAINLEVEL_OUTOF10: 3
PAINLEVEL_OUTOF10: 5

## 2019-04-15 ASSESSMENT — PAIN DESCRIPTION - PAIN TYPE
TYPE: CHRONIC PAIN;PHANTOM PAIN
TYPE: CHRONIC PAIN;PHANTOM PAIN

## 2019-04-15 NOTE — PROGRESS NOTES
(12/16/14  MDL); Coronary angioplasty with stent (12/18/14  MDL); Aortic valve replacement (01/29/2015); Leg amputation below knee (Right, 11/28/2017); and AMPUTATION ABOVE KNEE (Right, 12/1/2017). Restrictions     Vision/Hearing        Subjective  General  Diagnosis: bradycardia  Follows Commands: Within Functional Limits  General Comment  Comments: Pt is on 02. states he wears 02 24/7 at home  Subjective  Subjective: pt c/o being agitated by being without food all day waiting on heart cath. agrees to demonstrate method of TF's to [facility]. c/o \"gall bladder\" and \"phantom\" pain          Orientation  Orientation  Overall Orientation Status: Within Functional Limits  Social/Functional History  Social/Functional History  Lives With: Spouse  Type of Home: House  Home Access: (has a w/c \"lift\")  Home Equipment: Wheelchair-manual  ADL Assistance: Needs assistance  Transfer Assistance: Independent  Additional Comments: Pt states normally he is ind with TF's but did \"fall off\" bsc recently due to illness and weakness. Cognition        Objective          AROM RLE (degrees)  RLE AROM: WFL  RLE General AROM: R BKA  AROM LLE (degrees)  LLE AROM : WFL  Strength Other  Other: MOVES BILAT LE'S AGAINST GRAVITY  Tone RLE  RLE Tone: Normotonic  Tone LLE  LLE Tone: Normotonic  Motor Control  Gross Motor?: WFL  Coordination  Rapid Alternating Movements: Normal  Sensation  Overall Sensation Status: Impaired  Bed mobility  Rolling to Left: Independent  Rolling to Right: Independent  Supine to Sit: Independent  Sit to Supine: Independent  Comment: Pt CAN PERFORM SUP>SIT IND BUT SLOW AND LABORED. OFFERED TO TRY DIFFERENT TECHNIQUES BUT pt DECLINED AND STATES HE NORMALLY DOES BETTER WHEN HE IS NOT AS WEAK AND IN PAIN.   Transfers  Squat Pivot Transfers: Stand by assistance  Comment: DISCUSSED PROPER PLACEMENT OF EQUIPMENT FOR SAFETY WITH TF. pt STATES HE TRIES TO ALWAY TF TOWARD L SIDE BUT CAN GO EITHER WAY  Ambulation  Ambulation?: No

## 2019-04-15 NOTE — PROGRESS NOTES
Clinical Pharmacy Note    Jeanne Troncoso is a 77 y.o. male for whom pharmacy has been asked to manage warfarin therapy. Reason for Admission: Transfer from [facility] for Symptomatic Bradycardia    Consulting Physician: Mamie Jones PA-C  Warfarin dose prior to admission: Warfarin 5mg SuWeFr; 7.5mg MoTuThSa  Warfarin indication: A. Fib / Mechanical valve  Target INR range: 2.5-3.5       Past Medical History:   Diagnosis Date    Acute kidney failure (HCC)     Benign prostatic hyperplasia without urinary obstruction     CAD (coronary artery disease)     HFpEF NHYA Class 3, Stage C    CHF (congestive heart failure) (HCC)     history of    CHF (congestive heart failure) (HCC)     Chronic kidney disease (CKD)     Cirrhosis of liver (HCC)     Ascites/ followed by [facility]    COPD (chronic obstructive pulmonary disease) ([facility] [address]     Diabetes mellitus ([facility] [address]     Type 2    Emphysema lung (HCC)     Exposure to Agent Orange     Glaucoma     Gout     Heart failure, systolic, chronic (HCC)     EF 30% as per transferring doc    History of obstructive sleep apnea     Hyperlipidemia     Hypertension     Hypothyroidism     Insomnia     Muscle weakness     Myocardial infarction ([facility] [address] 12/2014    s/p RCA stent 12/2014    Occlusion and stenosis of left carotid artery     Personal history of pulmonary embolism 10/2014    Psychiatric problem     depression    PTSD (post-traumatic stress disorder)                 Recent Labs     04/15/19  0153   INR 2.33*     Recent Labs     04/14/19  1145 04/15/19  0153   HGB 9.1* 9.7*   HCT 32.7* 34.8*    136       Current warfarin drug-drug interactions: allopurinol, cipro, synthroid, fluoxetine, gabapentin        Date INR Warfarin Dose   04/12/19 2.86 5 mg   04/13/19  5 mg   04/14/19 2.45 5 mg   04/15/19 2.33 7.5 mg                      Daily PT/INR until stable within therapeutic range. Thank you for the consult.      CARIDAD ALBERT PHARM D, 4/15/2019, 10:06 AM

## 2019-04-15 NOTE — PROGRESS NOTES
[facility]      Patient:    Jeanne Troncoso  YOB: 1950  Date of Service:  4/15/2019  MRN:    [id number]    Room:   [address] [place] [address]   PCP:    Hendricks Epley, APRN - CNP     Chief Complaint:  AMS    Subjective:  Sitting up in bedside chair awaiting heart catheterization. Denies chest pain, shortness of breath. He complains of constipation, denies abdominal pain, nausea/vomiting, fever/chills. Review of Systems:   10 point review of systems is negative except as specifically addressed above. Objective:   VITALS:  /61   Pulse 95   Temp 97.2 °F (36.2 °C) (Temporal)   Resp 20   Ht 5' 11\" (1.803 m)   Wt 168 lb 12.8 oz (76.6 kg)   SpO2 98%   BMI 23.54 kg/m²   24HR INTAKE/OUTPUT:      Intake/Output Summary (Last 24 hours) at 4/15/2019 0840  Last data filed at 4/15/2019 0251  Gross per 24 hour   Intake 920 ml   Output 3400 ml   Net -2480 ml     General appearance: alert, appears stated age, cooperative and no distress  Head: Normocephalic, without obvious abnormality, atraumatic  Eyes: conjunctivae/corneas clear. PERRL, EOM's intact. Ears: normal external ears and nose, throat without exudate  Neck: Supple, no JVD, no tracheal deviation  Lungs: Normal respiratory effort, clear to auscultation bilaterally,no rales or wheezes   Heart: Irregularly irregular rhythm, S1, S2 normal  Abdomen: Soft, non-tender; non-distended, normal bowel sounds no masses, no organomegaly  Extremities: No pedal edema LLE, Right BKA. Skin: Skin color, texture, turgor normal. No rashes or lesions  Neurologic: AAO x3, generalized weakness, no focal deficits noted.   Psychiatric: Alert and oriented, thought content appropriate, normal insight, mood appropriate      Medications:      dextrose        sodium chloride flush  10 mL Intravenous 2 times per day    ciprofloxacin  400 mg Intravenous Q12H    insulin lispro  5 Units Subcutaneous TID WC    sodium chloride flush  10 mL Intravenous 2 times per day    aspirin  81 mg Oral Daily    allopurinol  100 mg Oral BID    atorvastatin  40 mg Oral Nightly    bumetanide  2 mg Oral Daily    FLUoxetine  20 mg Oral Nightly    fluticasone  2 spray Nasal BID    insulin glargine  42 Units Subcutaneous Nightly    lactulose  30 mL Oral BID    metolazone  2.5 mg Oral Daily    spironolactone  50 mg Oral Daily    tamsulosin  0.4 mg Oral Daily    ipratropium  0.5 mg Nebulization 4x daily    warfarin (COUMADIN) daily dosing (placeholder)   Other RX Placeholder    warfarin  5 mg Oral Daily    famotidine  20 mg Oral Daily    levothyroxine  50 mcg Oral Daily    folic acid  1 mg Oral Daily    ferrous sulfate  325 mg Oral Daily with breakfast    gabapentin  400 mg Oral QPM    insulin lispro  0-6 Units Subcutaneous TID WC    insulin lispro  0-3 Units Subcutaneous Nightly    latanoprost  1 drop Both Eyes Daily     sodium chloride flush, sodium chloride flush, ondansetron, polyethylene glycol, albuterol, nitroGLYCERIN, guaiFENesin, melatonin, lidocaine, glucose, dextrose, glucagon (rDNA), dextrose  Diet NPO Time Specified     Lab and other Data:     Recent Labs     04/14/19  1145 04/15/19  0153   WBC 4.5* 4.1*   HGB 9.1* 9.7*    136     Recent Labs     04/13/19  0136 04/14/19  1145 04/15/19  0153    141  --    K 4.4 4.8 4.5   CL 97* 94*  --    CO2 32* 37*  --    BUN 72* 57*  --    CREATININE 1.5* 1.2  --    GLUCOSE 221* 241*  --        INR:   Recent Labs     04/14/19  1145 04/15/19  0153   INR 2.45* 2.33*     ABGs:   Lab Results   Component Value Date    PHART 7.370 04/12/2019    PO2ART 48.0 04/12/2019    BPD2HVF 61.0 04/12/2019       Echo:      Summary   Prosthetic valve in the aortic position.   Trace aortic regurgitation.   Severely dilated right ventricular size with mildly reduced RV function.   Right ventricular systolic pressure is noted at 87 mmHg.      Signature      ----------------------------------------------------------------   Electronically signed by Fanny Oconnor MD(Interpreting   physician) on 04/13/2019 09:43 AM    Problem List:     Patient Active Problem List    Diagnosis Date Noted    S/P AVR      Priority: High    MARYLIN (acute kidney injury) ([address] 04/13/2019    Acute respiratory failure with hypoxia and hypercapnia (HCC) 42/50/4059    Metabolic encephalopathy 10/61/3639    Symptomatic bradycardia 04/12/2019 12/11/2014  Cath  Normal LVFX, 30 mmHg gradient across the AoV, severe RCA and LM disease  12/16/2014  Cath IVUS to the left main, 35% reduction in the intraluminal diameter  12/18/2014  Cath sent to proximal RCA, PTCA to distal RCA  01/29/2015  23 mm mechanical valve, mitral valve annuloplasty repair w/30 mm Physio and tricuspid ring annuloplasty repair w/34 mm MC3 ring  12/11/2017  Echo  Normal LVFX, normal AoV  4/13/2019  Echo  Mitral stenosis, PA 87 mmHg, AUC indication 70, AUC score 7       Exposure to Agent Orange     BKA stump complication ([address] 75/84/6982    Cirrhosis of liver with ascites ([address]     S/P BKA (below knee amputation), right ([address] 12/06/2017    Acute blood loss as cause of postoperative anemia     Type 2 diabetes mellitus with skin complication ([address] 31/06/6817    Acquired hypothyroidism     Essential hypertension     Mixed hyperlipidemia     Glaucoma     Panlobular emphysema ([address]     Stage 3 chronic kidney disease ([address]     Cirrhosis of liver ([address]      Ascites/ followed by [address] History of heart valve replacement with mechanical valve      A fib         Assessment and Plan:     Principal Problem:    Acute respiratory failure with hypoxia and hypercapnia (HCC)  Active Problems:    S/P AVR    Type 2 diabetes mellitus with skin complication (HCC)    Stage 3 chronic kidney disease (HCC)    Cirrhosis of liver ([address]    Exposure to Agent Orange    MARYLIN (acute kidney injury) ([address]    Metabolic encephalopathy  Resolved Problems:    * No resolved hospital problems.  *    Metabolic Encephalopathy with hypercapnia/hypoxia - Mental status improved with NIV. He is on continuous O2 at home. Continue supportive care, NIV QHS, continuous O2. Chronic hypoxia - on home oxygen. Hx of liver cirrhosis, ammonia not elevated, less likely hepatic encephalopathy - Continue maintenance lactulose and aldactone. Proteus Urine culture - Cipro started 4/14/19. Atrial fibrillation - rate controlled. Continue telemetry. Continue coumadin for anticoagulation. Pharmacy dosing. DM2 with hyperglycemia - Continue lantus qhs, mealtime lispro, SSI, and accuchecks qac and qhs. HbA1c 6.6  MARYLIN on CKD stage 3 at baseline - Renal parameters improving. Hx of AVR - on chronic coumadin. Pharmacy dosing. Reviewed records in [facility] from [place] - patient had previous INR goal of 2.5-3.5. This was decreased to INR goal of 2-3 due to repeated falls and high risk bleeding. Patient to have cardiac catheterization today. Repeat labs in am. Continue antibiotics. Discussed with attending.      Advance Directive:  Full Code   Antibiotic:  Cipro   DVT Prophylaxis:  Coumadin  GI prophylaxis:  Gustavo Truong PA-C  4/15/2019

## 2019-04-15 NOTE — PROGRESS NOTES
Physical Therapy  Attempted PT eval, but pt declined stating he did not feel like working with PT and felt like he would have no problem returning home and be able to perform TF's as previouis. Spoke with PCA who states she TF Pt to recliner with min/CGA.  Will continue to att PT eval.  Electronically signed by Aimee Fofana PT on 4/15/2019 at 8:50 AM

## 2019-04-16 PROBLEM — Z51.5 PALLIATIVE CARE PATIENT: Status: ACTIVE | Noted: 2019-04-16

## 2019-04-16 LAB
ANION GAP SERPL CALCULATED.3IONS-SCNC: 11 MMOL/L (ref 7–19)
BUN BLDV-MCNC: 44 MG/DL (ref 8–23)
CALCIUM SERPL-MCNC: 8.9 MG/DL (ref 8.8–10.2)
CHLORIDE BLD-SCNC: 88 MMOL/L (ref 98–111)
CO2: 39 MMOL/L (ref 22–29)
CREAT SERPL-MCNC: 1.3 MG/DL (ref 0.5–1.2)
EKG P AXIS: NORMAL DEGREES
EKG P-R INTERVAL: NORMAL MS
EKG Q-T INTERVAL: 406 MS
EKG QRS DURATION: 94 MS
EKG QTC CALCULATION (BAZETT): 428 MS
EKG T AXIS: 179 DEGREES
GFR NON-AFRICAN AMERICAN: 55
GLUCOSE BLD-MCNC: 157 MG/DL (ref 70–99)
GLUCOSE BLD-MCNC: 164 MG/DL (ref 70–99)
GLUCOSE BLD-MCNC: 166 MG/DL (ref 70–99)
GLUCOSE BLD-MCNC: 235 MG/DL (ref 70–99)
GLUCOSE BLD-MCNC: 249 MG/DL (ref 74–109)
HCT VFR BLD CALC: 32.4 % (ref 42–52)
HEMOGLOBIN: 9.2 G/DL (ref 14–18)
INR BLD: 2.25 (ref 0.88–1.18)
MCH RBC QN AUTO: 25.4 PG (ref 27–31)
MCHC RBC AUTO-ENTMCNC: 28.4 G/DL (ref 33–37)
MCV RBC AUTO: 89.5 FL (ref 80–94)
PDW BLD-RTO: 16.3 % (ref 11.5–14.5)
PERFORMED ON: ABNORMAL
PLATELET # BLD: 134 K/UL (ref 130–400)
PMV BLD AUTO: 9.4 FL (ref 9.4–12.4)
POTASSIUM SERPL-SCNC: 4.6 MMOL/L (ref 3.5–5)
PROTHROMBIN TIME: 24.1 SEC (ref 12–14.6)
RBC # BLD: 3.62 M/UL (ref 4.7–6.1)
SODIUM BLD-SCNC: 138 MMOL/L (ref 136–145)
WBC # BLD: 4.2 K/UL (ref 4.8–10.8)

## 2019-04-16 PROCEDURE — 36415 COLL VENOUS BLD VENIPUNCTURE: CPT

## 2019-04-16 PROCEDURE — 93005 ELECTROCARDIOGRAM TRACING: CPT

## 2019-04-16 PROCEDURE — 6370000000 HC RX 637 (ALT 250 FOR IP): Performed by: INTERNAL MEDICINE

## 2019-04-16 PROCEDURE — 2140000000 HC CCU INTERMEDIATE R&B

## 2019-04-16 PROCEDURE — 99232 SBSQ HOSP IP/OBS MODERATE 35: CPT | Performed by: PHYSICIAN ASSISTANT

## 2019-04-16 PROCEDURE — 85610 PROTHROMBIN TIME: CPT

## 2019-04-16 PROCEDURE — 82948 REAGENT STRIP/BLOOD GLUCOSE: CPT

## 2019-04-16 PROCEDURE — 2580000003 HC RX 258: Performed by: INTERNAL MEDICINE

## 2019-04-16 PROCEDURE — 2700000000 HC OXYGEN THERAPY PER DAY

## 2019-04-16 PROCEDURE — 6370000000 HC RX 637 (ALT 250 FOR IP): Performed by: PHYSICIAN ASSISTANT

## 2019-04-16 PROCEDURE — 6360000002 HC RX W HCPCS: Performed by: INTERNAL MEDICINE

## 2019-04-16 PROCEDURE — 94640 AIRWAY INHALATION TREATMENT: CPT

## 2019-04-16 PROCEDURE — 94660 CPAP INITIATION&MGMT: CPT

## 2019-04-16 PROCEDURE — 85027 COMPLETE CBC AUTOMATED: CPT

## 2019-04-16 PROCEDURE — 80048 BASIC METABOLIC PNL TOTAL CA: CPT

## 2019-04-16 RX ORDER — HYDROCODONE BITARTRATE AND ACETAMINOPHEN 5; 325 MG/1; MG/1
1 TABLET ORAL EVERY 6 HOURS PRN
Status: DISCONTINUED | OUTPATIENT
Start: 2019-04-16 | End: 2019-04-17 | Stop reason: HOSPADM

## 2019-04-16 RX ORDER — CIPROFLOXACIN 500 MG/1
500 TABLET, FILM COATED ORAL EVERY 12 HOURS SCHEDULED
Status: DISCONTINUED | OUTPATIENT
Start: 2019-04-16 | End: 2019-04-17 | Stop reason: HOSPADM

## 2019-04-16 RX ORDER — GABAPENTIN 400 MG/1
400 CAPSULE ORAL 3 TIMES DAILY
Status: DISCONTINUED | OUTPATIENT
Start: 2019-04-16 | End: 2019-04-17 | Stop reason: HOSPADM

## 2019-04-16 RX ADMIN — IPRATROPIUM BROMIDE 0.5 MG: 0.5 SOLUTION RESPIRATORY (INHALATION) at 10:53

## 2019-04-16 RX ADMIN — ALLOPURINOL 100 MG: 100 TABLET ORAL at 08:55

## 2019-04-16 RX ADMIN — ASPIRIN 81 MG: 81 TABLET, COATED ORAL at 08:55

## 2019-04-16 RX ADMIN — IPRATROPIUM BROMIDE 0.5 MG: 0.5 SOLUTION RESPIRATORY (INHALATION) at 15:10

## 2019-04-16 RX ADMIN — FERROUS SULFATE TAB 325 MG (65 MG ELEMENTAL FE) 325 MG: 325 (65 FE) TAB at 08:55

## 2019-04-16 RX ADMIN — Medication 10 ML: at 20:15

## 2019-04-16 RX ADMIN — HYDROCODONE BITARTRATE AND ACETAMINOPHEN 1 TABLET: 5; 325 TABLET ORAL at 19:31

## 2019-04-16 RX ADMIN — ACETAMINOPHEN 650 MG: 325 TABLET, FILM COATED ORAL at 02:43

## 2019-04-16 RX ADMIN — INSULIN LISPRO 2 UNITS: 100 INJECTION, SOLUTION INTRAVENOUS; SUBCUTANEOUS at 14:16

## 2019-04-16 RX ADMIN — IPRATROPIUM BROMIDE 0.5 MG: 0.5 SOLUTION RESPIRATORY (INHALATION) at 07:17

## 2019-04-16 RX ADMIN — LATANOPROST 1 DROP: 50 SOLUTION OPHTHALMIC at 08:59

## 2019-04-16 RX ADMIN — INSULIN LISPRO 1 UNITS: 100 INJECTION, SOLUTION INTRAVENOUS; SUBCUTANEOUS at 17:47

## 2019-04-16 RX ADMIN — FLUOXETINE 20 MG: 20 CAPSULE ORAL at 20:07

## 2019-04-16 RX ADMIN — SPIRONOLACTONE 50 MG: 50 TABLET ORAL at 08:55

## 2019-04-16 RX ADMIN — METOLAZONE 2.5 MG: 2.5 TABLET ORAL at 08:55

## 2019-04-16 RX ADMIN — IPRATROPIUM BROMIDE 0.5 MG: 0.5 SOLUTION RESPIRATORY (INHALATION) at 19:16

## 2019-04-16 RX ADMIN — ACETAMINOPHEN 650 MG: 325 TABLET, FILM COATED ORAL at 12:18

## 2019-04-16 RX ADMIN — CIPROFLOXACIN HYDROCHLORIDE 500 MG: 500 TABLET, FILM COATED ORAL at 10:19

## 2019-04-16 RX ADMIN — ATORVASTATIN CALCIUM 40 MG: 40 TABLET, FILM COATED ORAL at 20:07

## 2019-04-16 RX ADMIN — Medication 10 ML: at 09:00

## 2019-04-16 RX ADMIN — FLUTICASONE PROPIONATE 2 SPRAY: 50 SPRAY, METERED NASAL at 20:08

## 2019-04-16 RX ADMIN — WARFARIN SODIUM 7.5 MG: 5 TABLET ORAL at 17:50

## 2019-04-16 RX ADMIN — INSULIN LISPRO 5 UNITS: 100 INJECTION, SOLUTION INTRAVENOUS; SUBCUTANEOUS at 14:18

## 2019-04-16 RX ADMIN — INSULIN LISPRO 5 UNITS: 100 INJECTION, SOLUTION INTRAVENOUS; SUBCUTANEOUS at 08:56

## 2019-04-16 RX ADMIN — LIDOCAINE: 50 OINTMENT TOPICAL at 20:08

## 2019-04-16 RX ADMIN — INSULIN LISPRO 5 UNITS: 100 INJECTION, SOLUTION INTRAVENOUS; SUBCUTANEOUS at 17:47

## 2019-04-16 RX ADMIN — INSULIN GLARGINE 15 UNITS: 100 INJECTION, SOLUTION SUBCUTANEOUS at 20:15

## 2019-04-16 RX ADMIN — FAMOTIDINE 20 MG: 20 TABLET ORAL at 08:55

## 2019-04-16 RX ADMIN — FLUTICASONE PROPIONATE 2 SPRAY: 50 SPRAY, METERED NASAL at 08:56

## 2019-04-16 RX ADMIN — GABAPENTIN 400 MG: 400 CAPSULE ORAL at 14:15

## 2019-04-16 RX ADMIN — FOLIC ACID 1 MG: 1 TABLET ORAL at 08:55

## 2019-04-16 RX ADMIN — CIPROFLOXACIN HYDROCHLORIDE 500 MG: 500 TABLET, FILM COATED ORAL at 20:07

## 2019-04-16 RX ADMIN — ALLOPURINOL 100 MG: 100 TABLET ORAL at 20:07

## 2019-04-16 RX ADMIN — LEVOTHYROXINE SODIUM 50 MCG: 50 TABLET ORAL at 05:55

## 2019-04-16 RX ADMIN — GABAPENTIN 400 MG: 400 CAPSULE ORAL at 20:07

## 2019-04-16 RX ADMIN — LIDOCAINE: 50 OINTMENT TOPICAL at 02:35

## 2019-04-16 RX ADMIN — TAMSULOSIN HYDROCHLORIDE 0.4 MG: 0.4 CAPSULE ORAL at 08:55

## 2019-04-16 ASSESSMENT — PAIN SCALES - GENERAL
PAINLEVEL_OUTOF10: 0
PAINLEVEL_OUTOF10: 0
PAINLEVEL_OUTOF10: 4
PAINLEVEL_OUTOF10: 0
PAINLEVEL_OUTOF10: 8
PAINLEVEL_OUTOF10: 5
PAINLEVEL_OUTOF10: 0
PAINLEVEL_OUTOF10: 0
PAINLEVEL_OUTOF10: 5

## 2019-04-16 NOTE — PROGRESS NOTES
[facility]      Patient:    Jose Lafleur  YOB: 1950  Date of Service:  4/16/2019  MRN:    [id number]    Room:   [address] [place] [address]   PCP:    ALEXANDRA Carrizales CNP     Chief Complaint:  AMS    Subjective:  Eating breakfast. Denies new complaints. Underwent cardiac catheterization last night. Denies shortness of breath, chest pain or discomfort, nausea or vomiting. Wore CPAP for about 1 hour last night, states he is unable to tolerate it any further. Review of Systems:   10 point review of systems is negative except as specifically addressed above. Objective:   VITALS:  /71   Pulse 92   Temp 97.2 °F (36.2 °C) (Temporal)   Resp 16   Ht 5' 11\" (1.803 m)   Wt 168 lb 3.2 oz (76.3 kg)   SpO2 95%   BMI 23.46 kg/m²   24HR INTAKE/OUTPUT:      Intake/Output Summary (Last 24 hours) at 4/16/2019 0934  Last data filed at 4/16/2019 8229  Gross per 24 hour   Intake 400 ml   Output 2300 ml   Net -1900 ml     General appearance: alert, appears stated age, cooperative and no distress  Head: Normocephalic, without obvious abnormality, atraumatic  Eyes: conjunctivae/corneas clear. PERRL, EOM's intact. Ears: normal external ears and nose, throat without exudate  Neck: Supple, no JVD, no tracheal deviation  Lungs: Normal respiratory effort, clear to auscultation bilaterally, no wheezes, rales, or rhonchi. Heart: Irregularly irregular rhythm, normal S1 and S2. Abdomen: Soft, non-tender; non-distended, normal bowel sounds no masses, no organomegaly  Extremities: Right BKA, no edema, no LLE edema. Skin: Warm and dry, not diaphoretic. Neurologic: AAO x3, generalized weakness, no focal deficits noted. Psychiatric: Pleasant and cooperative, alert and oriented.       Medications:      dextrose        ciprofloxacin  500 mg Oral 2 times per day    sodium chloride flush  10 mL Intravenous 2 times per day    insulin lispro  5 Units Subcutaneous TID     sodium chloride flush  10 mL Intravenous 2 times per day    aspirin  81 mg Oral Daily    allopurinol  100 mg Oral BID    atorvastatin  40 mg Oral Nightly    bumetanide  2 mg Oral Daily    FLUoxetine  20 mg Oral Nightly    fluticasone  2 spray Nasal BID    insulin glargine  42 Units Subcutaneous Nightly    lactulose  30 mL Oral BID    metolazone  2.5 mg Oral Daily    spironolactone  50 mg Oral Daily    tamsulosin  0.4 mg Oral Daily    ipratropium  0.5 mg Nebulization 4x daily    warfarin (COUMADIN) daily dosing (placeholder)   Other RX Placeholder    famotidine  20 mg Oral Daily    levothyroxine  50 mcg Oral Daily    folic acid  1 mg Oral Daily    ferrous sulfate  325 mg Oral Daily with breakfast    gabapentin  400 mg Oral QPM    insulin lispro  0-6 Units Subcutaneous TID WC    insulin lispro  0-3 Units Subcutaneous Nightly    latanoprost  1 drop Both Eyes Daily     sodium chloride flush, acetaminophen, sodium chloride flush, ondansetron, polyethylene glycol, albuterol, nitroGLYCERIN, guaiFENesin, melatonin, lidocaine, glucose, dextrose, glucagon (rDNA), dextrose  DIET CARDIAC; Carb Control: 4 carb choices (60 gms)/meal; No Added Salt (3-4 GM);  No Caffeine     Lab and other Data:     Recent Labs     04/14/19  1145 04/15/19  0153 04/15/19  2109 04/16/19  0309   WBC 4.5* 4.1*  --  4.2*   HGB 9.1* 9.7* 12.4 9.2*    136  --  134     Recent Labs     04/14/19  1145 04/15/19  0153 04/15/19  2109 04/16/19  0309     --   --  138   K 4.8 4.5  --  4.6   CL 94*  --   --  88*   CO2 37*  --  46* 39*   BUN 57*  --   --  44*   CREATININE 1.2  --  1.4* 1.3*   GLUCOSE 241*  --   --  249*       INR:   Recent Labs     04/14/19  1145 04/15/19  0153 04/16/19  0309   INR 2.45* 2.33* 2.25*     ABGs:   Lab Results   Component Value Date    PHART 7.420 04/15/2019    PO2ART 96 04/15/2019    WYI1LHL 71 04/15/2019       Echo:      Summary   Prosthetic valve in the aortic position.   Trace aortic regurgitation.   Severely dilated right ventricular size with mildly reduced RV function.   Right ventricular systolic pressure is noted at 87 mmHg.      Signature      ----------------------------------------------------------------   Electronically signed by Shantell Hsu MD(Interpreting   physician) on 04/13/2019 09:43 AM    Problem List:     Patient Active Problem List    Diagnosis Date Noted    S/P AVR      Priority: High    MARYLIN (acute kidney injury) ([facility] 04/13/2019    Acute respiratory failure with hypoxia and hypercapnia (HCC) 58/56/4263    Metabolic encephalopathy 39/49/3652    Symptomatic bradycardia 04/12/2019 12/11/2014  Cath  Normal LVFX, 30 mmHg gradient across the AoV, severe RCA and LM disease  12/16/2014  Cath IVUS to the left main, 35% reduction in the intraluminal diameter  12/18/2014  Cath sent to proximal RCA, PTCA to distal RCA  01/29/2015  23 mm mechanical valve, mitral valve annuloplasty repair w/30 mm Physio and tricuspid ring annuloplasty repair w/34 mm MC3 ring  12/11/2017  Echo  Normal LVFX, normal AoV  4/13/2019  Echo  Mitral stenosis, PA 87 mmHg, AUC indication 70, AUC score 7       Exposure to Agent Orange     BKA stump complication ([address] 47/64/5739    Cirrhosis of liver with ascites ([address]     S/P BKA (below knee amputation), right ([address] 12/06/2017    Acute blood loss as cause of postoperative anemia     Type 2 diabetes mellitus with skin complication ([address] 83/81/2941    Acquired hypothyroidism     Essential hypertension     Mixed hyperlipidemia     Glaucoma     Panlobular emphysema ([address]     Stage 3 chronic kidney disease ([address]     Cirrhosis of liver ([address]      Ascites/ followed by [address] History of heart valve replacement with mechanical valve      A fib         Assessment and Plan:     Principal Problem:    Acute respiratory failure with hypoxia and hypercapnia (HCC)  Active Problems:    S/P AVR    Type 2 diabetes mellitus with skin complication (HCC)    Stage 3 chronic kidney

## 2019-04-16 NOTE — CARE COORDINATION
Left VM with Delfina King at [facility] re: necessary documentation for DME for home use (BiPAP) to be ordered thru Dr Cristina Syed.

## 2019-04-16 NOTE — PROGRESS NOTES
Palliative Care: Met with pt and wife Link Court, to initiate Palliative Care, wife says pt was having confusion, falling, Co2 was low and he had a UTI. Past Medical History:        Past Medical History:   Diagnosis Date    Acute kidney failure (HCC)     Benign prostatic hyperplasia without urinary obstruction     CAD (coronary artery disease)     HFpEF NHYA Class 3, Stage C    CHF (congestive heart failure) (HCC)     history of    CHF (congestive heart failure) (HCC)     Chronic kidney disease (CKD)     Cirrhosis of liver (HCC)     Ascites/ followed by [facility] COPD (chronic obstructive pulmonary disease) [facility]     Diabetes mellitus ([facility]     Type 2    Emphysema lung ([facility]     Exposure to Agent Orange     Glaucoma     Gout     Heart failure, systolic, chronic (HCC)     EF 30% as per transferring doc    History of obstructive sleep apnea     Hyperlipidemia     Hypertension     Hypothyroidism     Insomnia     Muscle weakness     Myocardial infarction ([facility] 12/2014    s/p RCA stent 12/2014    Occlusion and stenosis of left carotid artery     Personal history of pulmonary embolism 10/2014    Psychiatric problem     depression    PTSD (post-traumatic stress disorder)        Advance Directives:    Pt says he has a POA and there should be a copy in chart. Pain/Other Symptoms:  Pt is having pain and getting pain medication. Activity:         Pt to return to activity as tolerated. Psychological/Spiritual:        Pt says he is a Zoroastrianism. Patient/Family Discussion:      Pt has a wife, two sons, a daughter, and grandchildren. Plan:    Plans to return home. Patients goal, what they hope for:    Goal is to live one more year and then one more year and so on.         Electronically signed by Joey Askew on 4/16/2019 at 2:20 PM

## 2019-04-16 NOTE — PROGRESS NOTES
Clinical Pharmacy Note    Warfarin consult follow-up    Recent Labs     04/16/19  0309   INR 2.25*     Recent Labs     04/14/19  1145 04/15/19  0153 04/15/19  2109 04/16/19  0309   HGB 9.1* 9.7* 12.4 9.2*   HCT 32.7* 34.8*  --  32.4*    136  --  134     New warfarin drug-drug interactions: None    Date INR Warfarin Dose   04/12/19 2.86 5 mg   04/13/19   5 mg   04/14/19 2.45 5 mg   04/15/19 2.33 7.5 mg   04/16/19   2.25 7.5 mg                      Notes: Give Coumadin 7.5 mg by mouth x1 dose tonight. Daily PT/INR until stable within therapeutic range.      Electronically signed by Leslie Newby PharmD, BCPS on 4/16/2019 at 10:15 AM

## 2019-04-17 VITALS
OXYGEN SATURATION: 94 % | WEIGHT: 168 LBS | SYSTOLIC BLOOD PRESSURE: 129 MMHG | TEMPERATURE: 97 F | RESPIRATION RATE: 16 BRPM | DIASTOLIC BLOOD PRESSURE: 69 MMHG | BODY MASS INDEX: 23.52 KG/M2 | HEART RATE: 89 BPM | HEIGHT: 71 IN

## 2019-04-17 LAB
ANION GAP SERPL CALCULATED.3IONS-SCNC: 8 MMOL/L (ref 7–19)
BUN BLDV-MCNC: 41 MG/DL (ref 8–23)
CALCIUM SERPL-MCNC: 9.1 MG/DL (ref 8.8–10.2)
CHLORIDE BLD-SCNC: 89 MMOL/L (ref 98–111)
CO2: 40 MMOL/L (ref 22–29)
CREAT SERPL-MCNC: 1.5 MG/DL (ref 0.5–1.2)
EKG P AXIS: NORMAL DEGREES
EKG P-R INTERVAL: NORMAL MS
EKG Q-T INTERVAL: 424 MS
EKG QRS DURATION: 104 MS
EKG QTC CALCULATION (BAZETT): 460 MS
EKG T AXIS: 166 DEGREES
GFR NON-AFRICAN AMERICAN: 46
GLUCOSE BLD-MCNC: 160 MG/DL (ref 70–99)
GLUCOSE BLD-MCNC: 163 MG/DL (ref 74–109)
GLUCOSE BLD-MCNC: 228 MG/DL (ref 70–99)
GLUCOSE BLD-MCNC: 237 MG/DL (ref 70–99)
HCT VFR BLD CALC: 34.1 % (ref 42–52)
HEMOGLOBIN: 9.9 G/DL (ref 14–18)
INR BLD: 2.56 (ref 0.88–1.18)
MCH RBC QN AUTO: 24.9 PG (ref 27–31)
MCHC RBC AUTO-ENTMCNC: 29 G/DL (ref 33–37)
MCV RBC AUTO: 85.9 FL (ref 80–94)
PDW BLD-RTO: 16.3 % (ref 11.5–14.5)
PERFORMED ON: ABNORMAL
PLATELET # BLD: 156 K/UL (ref 130–400)
PMV BLD AUTO: 10.3 FL (ref 9.4–12.4)
POTASSIUM SERPL-SCNC: 4.9 MMOL/L (ref 3.5–5)
PROTHROMBIN TIME: 26.7 SEC (ref 12–14.6)
RBC # BLD: 3.97 M/UL (ref 4.7–6.1)
SODIUM BLD-SCNC: 137 MMOL/L (ref 136–145)
WBC # BLD: 5.2 K/UL (ref 4.8–10.8)

## 2019-04-17 PROCEDURE — 99238 HOSP IP/OBS DSCHRG MGMT 30/<: CPT | Performed by: INTERNAL MEDICINE

## 2019-04-17 PROCEDURE — 99233 SBSQ HOSP IP/OBS HIGH 50: CPT | Performed by: INTERNAL MEDICINE

## 2019-04-17 PROCEDURE — 80048 BASIC METABOLIC PNL TOTAL CA: CPT

## 2019-04-17 PROCEDURE — 85027 COMPLETE CBC AUTOMATED: CPT

## 2019-04-17 PROCEDURE — 6370000000 HC RX 637 (ALT 250 FOR IP): Performed by: INTERNAL MEDICINE

## 2019-04-17 PROCEDURE — 99232 SBSQ HOSP IP/OBS MODERATE 35: CPT | Performed by: PHYSICIAN ASSISTANT

## 2019-04-17 PROCEDURE — 82948 REAGENT STRIP/BLOOD GLUCOSE: CPT

## 2019-04-17 PROCEDURE — 2700000000 HC OXYGEN THERAPY PER DAY

## 2019-04-17 PROCEDURE — 94640 AIRWAY INHALATION TREATMENT: CPT

## 2019-04-17 PROCEDURE — 6360000002 HC RX W HCPCS: Performed by: INTERNAL MEDICINE

## 2019-04-17 PROCEDURE — 36415 COLL VENOUS BLD VENIPUNCTURE: CPT

## 2019-04-17 PROCEDURE — 85610 PROTHROMBIN TIME: CPT

## 2019-04-17 RX ORDER — LISINOPRIL 2.5 MG/1
2.5 TABLET ORAL 2 TIMES DAILY
Qty: 30 TABLET | Refills: 3 | Status: SHIPPED | OUTPATIENT
Start: 2019-04-17

## 2019-04-17 RX ORDER — WARFARIN SODIUM 5 MG/1
5 TABLET ORAL
Status: DISCONTINUED | OUTPATIENT
Start: 2019-04-17 | End: 2019-04-17 | Stop reason: HOSPADM

## 2019-04-17 RX ORDER — CIPROFLOXACIN 500 MG/1
500 TABLET, FILM COATED ORAL EVERY 12 HOURS SCHEDULED
Qty: 20 TABLET | Refills: 0 | Status: SHIPPED | OUTPATIENT
Start: 2019-04-17 | End: 2019-04-27

## 2019-04-17 RX ORDER — NITROGLYCERIN 0.4 MG/1
TABLET SUBLINGUAL
Qty: 25 TABLET | Refills: 3 | Status: SHIPPED | OUTPATIENT
Start: 2019-04-17

## 2019-04-17 RX ORDER — POLYETHYLENE GLYCOL 3350 17 G/17G
17 POWDER, FOR SOLUTION ORAL DAILY PRN
Qty: 527 G | Refills: 1 | Status: SHIPPED | OUTPATIENT
Start: 2019-04-17 | End: 2019-05-17

## 2019-04-17 RX ORDER — PSEUDOEPHEDRINE HCL 30 MG
100 TABLET ORAL 2 TIMES DAILY
Qty: 30 CAPSULE | Refills: 1 | Status: SHIPPED | OUTPATIENT
Start: 2019-04-17

## 2019-04-17 RX ORDER — ALBUTEROL SULFATE 2.5 MG/3ML
2.5 SOLUTION RESPIRATORY (INHALATION) EVERY 4 HOURS PRN
Qty: 120 EACH | Refills: 3 | Status: SHIPPED | OUTPATIENT
Start: 2019-04-17

## 2019-04-17 RX ORDER — DOCUSATE SODIUM 100 MG/1
100 CAPSULE, LIQUID FILLED ORAL 2 TIMES DAILY
Status: DISCONTINUED | OUTPATIENT
Start: 2019-04-17 | End: 2019-04-17 | Stop reason: HOSPADM

## 2019-04-17 RX ORDER — LISINOPRIL 2.5 MG/1
2.5 TABLET ORAL 2 TIMES DAILY
Status: DISCONTINUED | OUTPATIENT
Start: 2019-04-17 | End: 2019-04-17 | Stop reason: HOSPADM

## 2019-04-17 RX ORDER — HYDROCODONE BITARTRATE AND ACETAMINOPHEN 5; 325 MG/1; MG/1
1 TABLET ORAL EVERY 6 HOURS PRN
Qty: 15 TABLET | Refills: 0 | Status: SHIPPED | OUTPATIENT
Start: 2019-04-17 | End: 2019-04-20

## 2019-04-17 RX ORDER — INSULIN GLARGINE 100 [IU]/ML
15 INJECTION, SOLUTION SUBCUTANEOUS NIGHTLY
Status: DISCONTINUED | OUTPATIENT
Start: 2019-04-17 | End: 2019-04-17 | Stop reason: HOSPADM

## 2019-04-17 RX ADMIN — INSULIN LISPRO 1 UNITS: 100 INJECTION, SOLUTION INTRAVENOUS; SUBCUTANEOUS at 13:55

## 2019-04-17 RX ADMIN — LISINOPRIL 2.5 MG: 2.5 TABLET ORAL at 13:56

## 2019-04-17 RX ADMIN — FOLIC ACID 1 MG: 1 TABLET ORAL at 08:29

## 2019-04-17 RX ADMIN — METOLAZONE 2.5 MG: 2.5 TABLET ORAL at 08:28

## 2019-04-17 RX ADMIN — ALLOPURINOL 100 MG: 100 TABLET ORAL at 08:29

## 2019-04-17 RX ADMIN — CIPROFLOXACIN HYDROCHLORIDE 500 MG: 500 TABLET, FILM COATED ORAL at 08:29

## 2019-04-17 RX ADMIN — GABAPENTIN 400 MG: 400 CAPSULE ORAL at 13:56

## 2019-04-17 RX ADMIN — IPRATROPIUM BROMIDE 0.5 MG: 0.5 SOLUTION RESPIRATORY (INHALATION) at 15:27

## 2019-04-17 RX ADMIN — LEVOTHYROXINE SODIUM 50 MCG: 50 TABLET ORAL at 05:48

## 2019-04-17 RX ADMIN — IPRATROPIUM BROMIDE 0.5 MG: 0.5 SOLUTION RESPIRATORY (INHALATION) at 07:34

## 2019-04-17 RX ADMIN — INSULIN LISPRO 5 UNITS: 100 INJECTION, SOLUTION INTRAVENOUS; SUBCUTANEOUS at 08:51

## 2019-04-17 RX ADMIN — GABAPENTIN 400 MG: 400 CAPSULE ORAL at 08:28

## 2019-04-17 RX ADMIN — INSULIN LISPRO 5 UNITS: 100 INJECTION, SOLUTION INTRAVENOUS; SUBCUTANEOUS at 13:55

## 2019-04-17 RX ADMIN — IPRATROPIUM BROMIDE 0.5 MG: 0.5 SOLUTION RESPIRATORY (INHALATION) at 11:26

## 2019-04-17 RX ADMIN — HYDROCODONE BITARTRATE AND ACETAMINOPHEN 1 TABLET: 5; 325 TABLET ORAL at 00:35

## 2019-04-17 RX ADMIN — FAMOTIDINE 20 MG: 20 TABLET ORAL at 08:29

## 2019-04-17 RX ADMIN — ASPIRIN 81 MG: 81 TABLET, COATED ORAL at 08:29

## 2019-04-17 RX ADMIN — SPIRONOLACTONE 50 MG: 50 TABLET ORAL at 08:29

## 2019-04-17 RX ADMIN — FLUTICASONE PROPIONATE 2 SPRAY: 50 SPRAY, METERED NASAL at 08:30

## 2019-04-17 RX ADMIN — TAMSULOSIN HYDROCHLORIDE 0.4 MG: 0.4 CAPSULE ORAL at 08:29

## 2019-04-17 RX ADMIN — INSULIN LISPRO 2 UNITS: 100 INJECTION, SOLUTION INTRAVENOUS; SUBCUTANEOUS at 08:48

## 2019-04-17 RX ADMIN — LATANOPROST 1 DROP: 50 SOLUTION OPHTHALMIC at 08:30

## 2019-04-17 RX ADMIN — FERROUS SULFATE TAB 325 MG (65 MG ELEMENTAL FE) 325 MG: 325 (65 FE) TAB at 08:29

## 2019-04-17 RX ADMIN — HYDROCODONE BITARTRATE AND ACETAMINOPHEN 1 TABLET: 5; 325 TABLET ORAL at 05:48

## 2019-04-17 RX ADMIN — BUMETANIDE 2 MG: 1 TABLET ORAL at 08:29

## 2019-04-17 ASSESSMENT — PAIN SCALES - GENERAL
PAINLEVEL_OUTOF10: 0
PAINLEVEL_OUTOF10: 0
PAINLEVEL_OUTOF10: 7
PAINLEVEL_OUTOF10: 0
PAINLEVEL_OUTOF10: 7

## 2019-04-17 NOTE — PROGRESS NOTES
[address]    Daily HOSPITAL Progress Note                            Date:  4/16/19  Patient: Francisca Brink  Admission:  4/12/2019  2:47 AM  Admit DX: Symptomatic bradycardia [R00.1]  Age:  77 y. o., 1950        Date of Admission 4/12/2019   Hospital Length of Stay 4 days           I personally saw the patient and rounded with:  Niko Lucas 4/16/19      The observations documented in this note, including the assessment and plan are mine         Reason for initial evaluation or the patient's initial complaint    dyspnea      SUBJECTIVE:      Chief Complaint / Reason for the Visit   Follow up of:  dyspnea and bradycardia and systemic arterial hypertension    Family present and in room during examination:  Yes: wife      Specialty Problems        Cardiology Problems    Essential hypertension              Current Status Today According to the patient:  \"ok\"    Subjective:  Mr. Francisca Brink is generally feeling unchanged. Contacted structural heart disease specialist at [facility], will sent images to him    Mr. Francisca Brink has the following cardiac complaints / symptoms today:    1. bradycardia, Is stable on current medications     2. dyspnea, about the same, moderate to severe AI and MR    3.  Hypertension    The blood pressure for the lastr 36 hours has been:  Systolic (42YED), JFR:087 , Min:110 , LJF:925    Diastolic (15AJR), YEC:74, Min:60, Max:76        Francisca Brink is a 77 y.o. male with the following history as recorded in EpicCare:    Patient Active Problem List    Diagnosis Date Noted    S/P AVR      Priority: High    Palliative care patient 04/16/2019     Priority: Low    MARYLIN [facility]acute kidney injury) ([facility] [address] 04/13/2019     Priority: Low    Acute respiratory failure with hypoxia and hypercapnia (HCC) 04/13/2019     Priority: Low    Metabolic encephalopathy 78/33/0226     Priority: Low    Symptomatic bradycardia 04/12/2019     Priority: Low    Exposure to Agent Orange      Priority: Low    BKA stump complication ([facility] 61/37/2195     Priority: Low    Cirrhosis of liver with ascites (HCC)      Priority: Low    S/P BKA (below knee amputation), right ([facility] 12/06/2017     Priority: Low    Acute blood loss as cause of postoperative anemia      Priority: Low    Type 2 diabetes mellitus with skin complication ([facility] 16/46/4983     Priority: Low    Acquired hypothyroidism      Priority: Low    Essential hypertension      Priority: Low    Mixed hyperlipidemia      Priority: Low    Glaucoma      Priority: Low    Panlobular emphysema (HCC)      Priority: Low    Stage 3 chronic kidney disease ([facility]      Priority: Low    Cirrhosis of liver (HCC)      Priority: Low    History of heart valve replacement with mechanical valve      Priority: Low     Current Facility-Administered Medications   Medication Dose Route Frequency Provider Last Rate Last Dose    warfarin (COUMADIN) tablet 5 mg  5 mg Oral Once Ondina Russell PA-C        insulin glargine (LANTUS) injection vial 15 Units  15 Units Subcutaneous Nightly Ondina Russell PA-C        lisinopril (PRINIVIL;ZESTRIL) tablet 2.5 mg  2.5 mg Oral BID Luis Enrique Reinoso MD        ciprofloxacin (CIPRO) tablet 500 mg  500 mg Oral 2 times per day Carlos Best, DO   500 mg at 04/17/19 4320    gabapentin (NEURONTIN) capsule 400 mg  400 mg Oral TID Carlos Best, DO   400 mg at 04/17/19 0828    HYDROcodone-acetaminophen (NORCO) 5-325 MG per tablet 1 tablet  1 tablet Oral Q6H PRN Luis Enrique Reinoso MD   1 tablet at 04/17/19 0548    sodium chloride flush 0.9 % injection 10 mL  10 mL Intravenous 2 times per day Luis Enrique Reinoso MD   10 mL at 04/16/19 2015    sodium chloride flush 0.9 % injection 10 mL  10 mL Intravenous PRN Luis Enrique Reinoso MD        acetaminophen (TYLENOL) tablet 650 mg  650 mg Oral Q4H PRN Luis Enrique Reinoso MD   650 mg at 04/16/19 1218    insulin lispro (HUMALOG) injection vial 5 Units  5 Units Subcutaneous TID  Lorenzo Martinez MD   5 Units at 04/17/19 0851    ondansetron (ZOFRAN) injection 4 mg  4 mg Intravenous Q6H PRN Lorenzo Martinez MD   4 mg at 04/13/19 1134    polyethylene glycol (GLYCOLAX) packet 17 g  17 g Oral Daily PRN Lorenzo Martinez MD   Stopped at 04/12/19 1226    albuterol (PROVENTIL) nebulizer solution 2.5 mg  2.5 mg Nebulization Q4H PRN Lorenzo Martinez MD        nitroGLYCERIN (NITROSTAT) SL tablet 0.4 mg  0.4 mg Sublingual Q5 Min PRN Lroenzo Martinez MD        aspirin EC tablet 81 mg  81 mg Oral Daily Lorenzo Sinks, MD   81 mg at 04/17/19 0829    allopurinol (ZYLOPRIM) tablet 100 mg  100 mg Oral BID Lorenzo SinkMD blayne   100 mg at 04/17/19 4921    atorvastatin (LIPITOR) tablet 40 mg  40 mg Oral Nightly Lorenzo Sinks, MD   40 mg at 04/16/19 2007    bumetanide (BUMEX) tablet 2 mg  2 mg Oral Daily Lorenzo SinkMD blayne   2 mg at 04/17/19 0829    FLUoxetine (PROZAC) capsule 20 mg  20 mg Oral Nightly Lorenzo Sinks, MD   20 mg at 04/16/19 2007    fluticasone (FLONASE) 50 MCG/ACT nasal spray 2 spray  2 spray Nasal BID Lorenzo Martinez MD   2 spray at 04/17/19 0830    guaiFENesin (MUCINEX) extended release tablet 1,200 mg  1,200 mg Oral BID PRN Lorenzo Martinez MD   1,200 mg at 04/12/19 1228    lactulose (CHRONULAC) 10 GM/15ML solution 20 g  30 mL Oral BID Lorenzo Martinez MD   20 g at 04/13/19 2100    metolazone (ZAROXOLYN) tablet 2.5 mg  2.5 mg Oral Daily Lorenzo SinkMD blayne   2.5 mg at 04/17/19 2297    spironolactone (ALDACTONE) tablet 50 mg  50 mg Oral Daily Countyline Sinks, MD   50 mg at 04/17/19 0829    tamsulosin (FLOMAX) capsule 0.4 mg  0.4 mg Oral Daily Countyline Sinks, MD   0.4 mg at 04/17/19 7170    ipratropium (ATROVENT) 0.02 % nebulizer solution 0.5 mg  0.5 mg Nebulization 4x daily Lorenzo Martinez MD   0.5 mg at 04/17/19 3752    warfarin (COUMADIN) daily dosing (placeholder)   Other RX Rajesh Khan MD        melatonin tablet 9 mg  9 mg Oral Nightly PRN Collette Muller MD   9 mg at 04/13/19 2205    famotidine (PEPCID) tablet 20 mg  20 mg Oral Daily Collette Muller MD   20 mg at 04/17/19 0829    lidocaine (XYLOCAINE) 5 % ointment   Topical 4x Daily PRN Collette Muller MD        levothyroxine (SYNTHROID) tablet 50 mcg  50 mcg Oral Daily Collette Muller MD   50 mcg at 23/36/18 6061    folic acid (FOLVITE) tablet 1 mg  1 mg Oral Daily Collette Muller MD   1 mg at 04/17/19 8575    ferrous sulfate tablet 325 mg  325 mg Oral Daily with breakfast Collette Muller MD   325 mg at 04/17/19 0829    glucose (GLUTOSE) 40 % oral gel 15 g  15 g Oral PRN Collette Muller MD        dextrose 50 % solution 12.5 g  12.5 g Intravenous PRN Collette Muller MD        glucagon (rDNA) injection 1 mg  1 mg Intramuscular PRN Collette Muller MD        dextrose 5 % solution  100 mL/hr Intravenous PRN Collette Muller MD        insulin lispro (HUMALOG) injection vial 0-6 Units  0-6 Units Subcutaneous TID WC Collette Muller MD   2 Units at 04/17/19 0848    insulin lispro (HUMALOG) injection vial 0-3 Units  0-3 Units Subcutaneous Nightly Collette Muller MD   1 Units at 04/14/19 2101    latanoprost (XALATAN) 0.005 % ophthalmic solution 1 drop  1 drop Both Eyes Daily Collette Muller MD   1 drop at 04/17/19 0830     Allergies: Cephalexin; Prazosin; and Valproic acid and related  Past Medical History:   Diagnosis Date    Acute kidney failure ([facility] [address]     Benign prostatic hyperplasia without urinary obstruction     CAD (coronary artery disease)     HFpEF NHYA Class 3, Stage C    CHF (congestive heart failure) (HCC)     history of    CHF (congestive heart failure) (HCC)     Chronic kidney disease (CKD)     Cirrhosis of liver ([facility] [address]     Ascites/ [facility] by [facility]    COPD (chronic obstructive pulmonary disease) ([facility] [address]     Diabetes mellitus ([facility] [address]     Type 2    Emphysema lung ([facility] [address]     Exposure to Agent Palpitations No         Objective:    /66   Pulse 88   Temp 96.2 °F (35.7 °C) (Temporal)   Resp 16   Ht 5' 11\" (1.803 m)   Wt 168 lb (76.2 kg)   SpO2 94%   BMI 23.43 kg/m² ,     Intake/Output Summary (Last 24 hours) at 4/17/2019 1127  Last data filed at 4/17/2019 0954  Gross per 24 hour   Intake 980 ml   Output 2000 ml   Net -1020 ml       GENERAL - well developed and well nourished, is an active participant in this examination  HEENT -  PERRLA, Hearing appears normal, conjunctiva and lids are normal, ears and nose appear normal  NECK - no thyromegaly, no JVD, trachea is in the midline  CARDIOVASCULAR - PMI is in the left mid line clavicular position, Normal S1 and S2 with a grade 2/6 systolic murmur. No S3 or S4    PULMONARY - No respiratory distress. scattered wheezes and rales. Breath sounds in both  lung fields are Decreased  ABDOMEN  - soft, non tender, no rebound, no hepatomegaly or splenomegaly  MUSCULOSKELETAL  - Prone/Supine, digitals and nails are without clubbing or cyanosis  EXTREMITIES - trace edema  NEUROLOGIC - cranial nerves, II-XII, are normal  SKIN - turgor is normal, no rash  PSYCHIATRIC - normal mood and affect, alert and orientated x 3, judgement and insight appear appropriate      ASSESSMENT:    ALL THE CARDIOLOGY PROBLEMS ARE LISTED ABOVE; HOWEVER, THE FOLLOWING SPECIFIC CARDIAC PROBLEMS / CONDITIONS WERE ADDRESSED AND TREATED DURING THE OFFICE VISIT TODAY:                                                                                            MEDICAL DECISION MAKING                   Cardiac Specific Problem / Diagnosis   Discussion and Data Reviewed Diagnostic Procedures Ordered Management Options Selected                 1. Presenting problem / symptom  Progressive shortness of air  are worsening    Multifactorial etiology Yes: echo Continue current medications:      Yes:                  2.  Prior cardiac history of valvular heart disease Initial presentation during this evaluation    Review and summation of records:     2014  Cath  Normal LVFX, 30 mmHg gradient across the AoV, severe RCA and LM disease  2014  Cath IVUS to the left main, 35% reduction in the intraluminal diameter  2014  Cath sent to proximal RCA, PTCA to distal RCA  2015  23 mm mechanical valve, mitral valve annuloplasty repair w/30 mm Physio and tricuspid ring annuloplasty repair w/34 mm MC3 ring  2017  Echo  Normal LVFX, normal AoV          Yes: echo Continue current medications:     Yes:                  3. Systemic arterial hypertension Initial presentation during this evaluation The blood pressure for the lastr 36 hours has been:  Systolic (67BGP), QEF:176 , Min:110 , OA    Diastolic (31WXG), MBM:30, Min:60, Max:76             No Continue current medications:        yes                       CONSIDERATIONS, THOUGHTS, AND PLANS:    1. Continue present medications except for changes as noted above  2. Continue to monitor rhythm  3. Further orders per clinical course. 4. Will send images to [facility] tomorrow           Discussed with patient and nursing.     Electronically signed by Kinza Deleon MD on 19        [facility] [place] [facility]

## 2019-04-17 NOTE — PROGRESS NOTES
Priority: Low    BKA stump complication ([facility] 57/83/9297     Priority: Low    Cirrhosis of liver with ascites (HCC)      Priority: Low    S/P BKA (below knee amputation), right ([facility] 12/06/2017     Priority: Low    Acute blood loss as cause of postoperative anemia      Priority: Low    Type 2 diabetes mellitus with skin complication ([facility] 27/40/4475     Priority: Low    Acquired hypothyroidism      Priority: Low    Essential hypertension      Priority: Low    Mixed hyperlipidemia      Priority: Low    Glaucoma      Priority: Low    Panlobular emphysema (HCC)      Priority: Low    Stage 3 chronic kidney disease ([facility]      Priority: Low    Cirrhosis of liver (HCC)      Priority: Low    History of heart valve replacement with mechanical valve      Priority: Low     Current Facility-Administered Medications   Medication Dose Route Frequency Provider Last Rate Last Dose    warfarin (COUMADIN) tablet 5 mg  5 mg Oral Once [place] BRIGHT Jama        insulin glargine (LANTUS) injection vial 15 Units  15 Units Subcutaneous Nightly [place] BRIGHT Jama        lisinopril (PRINIVIL;ZESTRIL) tablet 2.5 mg  2.5 mg Oral BID Elias Vallejo MD        ciprofloxacin (CIPRO) tablet 500 mg  500 mg Oral 2 times per day Merlinda Penning, DO   500 mg at 04/17/19 0151    gabapentin (NEURONTIN) capsule 400 mg  400 mg Oral TID Merlinda Penning, DO   400 mg at 04/17/19 0828    HYDROcodone-acetaminophen (NORCO) 5-325 MG per tablet 1 tablet  1 tablet Oral Q6H PRN Elias Vallejo MD   1 tablet at 04/17/19 0548    sodium chloride flush 0.9 % injection 10 mL  10 mL Intravenous 2 times per day Elias Vallejo MD   10 mL at 04/16/19 2015    sodium chloride flush 0.9 % injection 10 mL  10 mL Intravenous PRN Elias Vallejo MD        acetaminophen (TYLENOL) tablet 650 mg  650 mg Oral Q4H PRN Elias Vallejo MD   650 mg at 04/16/19 1218    insulin lispro (HUMALOG) injection vial 5 Units  5 Units Subcutaneous TID BARBARA Philip MD Bia   5 Units at 04/17/19 0851    ondansetron (ZOFRAN) injection 4 mg  4 mg Intravenous Q6H PRN Atiya Washington MD   4 mg at 04/13/19 1134    polyethylene glycol (GLYCOLAX) packet 17 g  17 g Oral Daily PRN Atiya Washington MD   Stopped at 04/12/19 1226    albuterol (PROVENTIL) nebulizer solution 2.5 mg  2.5 mg Nebulization Q4H PRN Atiya Washington MD        nitroGLYCERIN (NITROSTAT) SL tablet 0.4 mg  0.4 mg Sublingual Q5 Min PRN Atiya Washington MD        aspirin EC tablet 81 mg  81 mg Oral Daily Atiya Washington MD   81 mg at 04/17/19 0829    allopurinol (ZYLOPRIM) tablet 100 mg  100 mg Oral BID Atiya Washington MD   100 mg at 04/17/19 4643    atorvastatin (LIPITOR) tablet 40 mg  40 mg Oral Nightly Atiya Washington MD   40 mg at 04/16/19 2007    bumetanide (BUMEX) tablet 2 mg  2 mg Oral Daily Atiya Washington MD   2 mg at 04/17/19 1175    FLUoxetine (PROZAC) capsule 20 mg  20 mg Oral Nightly Atiya Washington MD   20 mg at 04/16/19 2007    fluticasone (FLONASE) 50 MCG/ACT nasal spray 2 spray  2 spray Nasal BID Atiya Washington MD   2 spray at 04/17/19 0830    guaiFENesin (MUCINEX) extended release tablet 1,200 mg  1,200 mg Oral BID PRN Atiya Washington MD   1,200 mg at 04/12/19 1228    lactulose (CHRONULAC) 10 GM/15ML solution 20 g  30 mL Oral BID Atiya Washington MD   20 g at 04/13/19 2100    metolazone (ZAROXOLYN) tablet 2.5 mg  2.5 mg Oral Daily Atiya Washington MD   2.5 mg at 04/17/19 1928    spironolactone (ALDACTONE) tablet 50 mg  50 mg Oral Daily Atiya Washington MD   50 mg at 04/17/19 0829    tamsulosin (FLOMAX) capsule 0.4 mg  0.4 mg Oral Daily Atiya Washington MD   0.4 mg at 04/17/19 5924    ipratropium (ATROVENT) 0.02 % nebulizer solution 0.5 mg  0.5 mg Nebulization 4x daily Atiya Washington MD   0.5 mg at 04/17/19 0734    warfarin (COUMADIN) daily dosing (placeholder)   Other RX Mor Jasso MD        melatonin tablet 9 mg  9 mg Oral Nightly PRN Delaney Castaneda MD   9 mg at 04/13/19 2205    famotidine (PEPCID) tablet 20 mg  20 mg Oral Daily Delaney Castaneda MD   20 mg at 04/17/19 0829    lidocaine (XYLOCAINE) 5 % ointment   Topical 4x Daily PRN Delaney Castaneda MD        levothyroxine (SYNTHROID) tablet 50 mcg  50 mcg Oral Daily Delaney Castaneda MD   50 mcg at 89/96/05 1636    folic acid (FOLVITE) tablet 1 mg  1 mg Oral Daily Delaney Castaneda MD   1 mg at 04/17/19 0260    ferrous sulfate tablet 325 mg  325 mg Oral Daily with breakfast Delaney Castaneda MD   325 mg at 04/17/19 0829    glucose (GLUTOSE) 40 % oral gel 15 g  15 g Oral PRN Delaney Castaneda MD        dextrose 50 % solution 12.5 g  12.5 g Intravenous PRN Delaney Castaneda MD        glucagon (rDNA) injection 1 mg  1 mg Intramuscular PRN Delaney Castaneda MD        dextrose 5 % solution  100 mL/hr Intravenous PRN Delaney Castaneda MD        insulin lispro (HUMALOG) injection vial 0-6 Units  0-6 Units Subcutaneous TID WC Delaney Castaneda MD   2 Units at 04/17/19 0848    insulin lispro (HUMALOG) injection vial 0-3 Units  0-3 Units Subcutaneous Nightly Delaney Castaneda MD   1 Units at 04/14/19 2101    latanoprost (XALATAN) 0.005 % ophthalmic solution 1 drop  1 drop Both Eyes Daily Delaney Castaneda MD   1 drop at 04/17/19 0830     Allergies: Cephalexin; Prazosin; and Valproic acid and related  Past Medical History:   Diagnosis Date    [facility] failure ([facility]     Benign prostatic hyperplasia without urinary obstruction     CAD (coronary artery disease)     HFpEF NHYA Class 3, Stage C    CHF (congestive heart failure) (HCC)     history of    CHF (congestive heart failure) (HCC)     Chronic kidney disease (CKD)     Cirrhosis of liver ([facility]     Ascites[facility]    COPD (chronic obstructive pulmonary disease) ([facility]     Diabetes mellitus ([facility] [address] [facility]     Type 2    Emphysema lung ([facility]     Exposure to Agent [employer]98[employer] Glaucoma     Gout     Heart failure, systolic, chronic (HCC)     EF 30% as per transferring doc    History of obstructive sleep apnea     Hyperlipidemia     Hypertension     Hypothyroidism     Insomnia     Muscle weakness     Myocardial infarction [facility]) 12/2014    s/p RCA stent 12/2014    Occlusion and stenosis of left carotid artery     Palliative care patient 04/16/2019    Personal history of pulmonary embolism 10/2014    Psychiatric problem     depression    PTSD (post-traumatic stress disorder)      Past Surgical History:   Procedure Laterality Date    AMPUTATION ABOVE KNEE Right 12/1/2017    RIGHT BELOW KNEE AMPUTATION performed by Gail Krause MD at [address] [facility] [place] [address]  01/29/2015    23 mm mechanical valve, mitral valve annuloplasty repair w/30 mm Physio and tricuspid ring annuloplasty repair w/34 mm MC3 ring    CARDIAC CATHETERIZATION  12/11/14  [facility]    EF60%    CARDIAC CATHETERIZATION  12/16/14  [facility]    right heart cath EF 60%    CORONARY ANGIOPLASTY WITH STENT PLACEMENT  12/18/14  [facility]    to RCA    LEG AMPUTATION BELOW KNEE Right 11/28/2017     RIGHT OPEN BELOW KNEE AMPUTATION performed by Gail Krause MD at [facility] OR     Family History   Problem Relation Age of Onset    Parkinsonism Mother     Heart Disease Father     COPD Father     COPD Sister     Heart Disease Brother      Social History     Tobacco Use    Smoking status: Never Smoker    Smokeless tobacco: Never Used   Substance Use Topics    Alcohol use: No          Review of Systems:    General:      Complaint / Symptom Yes / No / Description if Yes       Fatigue Yes:  chronic   Weight gain NA   Insomnia NA       Respiratory:        Complaint / Symptom Yes / No / Description if Yes       Cough No   Horseness NA       Cardiovascular:    Complaint / Symptom Yes / No / Description if Yes       Chest Pain No   Shortness of Air / Orthopnea Yes: chronic and stable   Presyncope / Syncope No   Palpitations No Review and summation of records:     12/11/2014  Cath  Normal LVFX, 30 mmHg gradient across the AoV, severe RCA and LM disease  12/16/2014  Cath IVUS to the left main, 35% reduction in the intraluminal diameter  12/18/2014  Cath sent to proximal RCA, PTCA to distal RCA  01/29/2015  23 mm mechanical valve, mitral valve annuloplasty repair w/30 mm Physio and tricuspid ring annuloplasty repair w/34 mm MC3 ring  12/11/2017  Echo  Normal LVFX, normal AoV                   Cardiac Specific Problem / Diagnosis   Discussion and Data Reviewed Diagnostic Procedures Ordered Management Options Selected                 1. Presenting problem / symptom  Progressive shortness of air  are worsening    Multifactorial etiology Yes: echo Continue current medications:      Yes:                  2. Prior cardiac history of valvular heart disease Initial presentation during this evaluation    Review and summation of records:     12/11/2014  Cath  Normal LVFX, 30 mmHg gradient across the AoV, severe RCA and LM disease  12/16/2014  Cath IVUS to the left main, 35% reduction in the intraluminal diameter  12/18/2014  Cath sent to proximal RCA, PTCA to distal RCA  01/29/2015  23 mm mechanical valve, mitral valve annuloplasty repair w/30 mm Physio and tricuspid ring annuloplasty repair w/34 mm MC3 ring  12/11/2017  Echo  Normal LVFX, normal AoV          Yes: echo Continue current medications:     Yes:                  3. Systemic arterial hypertension Initial presentation during this evaluation The blood pressure for the lastr 36 hours has been:  Systolic (62VOG), OJP:276 , Min:110 , EOP:380    Diastolic (91SFR), EUA:09, Min:60, Max:76             No Continue current medications:        yes                  Yes: echo Continue current medications:     Yes:                  3.  Systemic arterial hypertension Initial presentation during this evaluation The blood pressure for the lastr 36 hours has been:  Systolic (48TWE), TCP:459 , Min:110 , Max:142 Diastolic (13QYL), EJJ:10, Min:60, Max:76             No Continue current medications:        yes                       CONSIDERATIONS, THOUGHTS, AND PLANS:    1. Continue present medications except for changes as noted above  2. Continue to monitor rhythm  3. Further orders per clinical course. 4. Bedrest for two hours           Discussed with patient and spouse and nursing.     Electronically signed by Darius Esqueda MD on 4/17/19        [id number] [facility] [place]

## 2019-04-17 NOTE — PROCEDURES
Cardiac Risk Profile -         Risk Factor Yes / No / Unknown         Gender Male   Cigarette Use No   Family History of Cardiovascular Disease Yes: heart disease   Diabetes Mellitus yes   Hypercholesteremia yes             Prior Cardiac History -      12/11/2014  Cath  Normal LVFX, 30 mmHg gradient across the AoV, severe RCA and LM disease  12/16/2014  Cath IVUS to the left main, 35% reduction in the intraluminal diameter  12/18/2014  Cath sent to proximal RCA, PTCA to distal RCA  01/29/2015  23 mm mechanical valve, mitral valve annuloplasty repair w/30 mm Physio and tricuspid ring annuloplasty repair w/34 mm MC3 ring  12/11/2017  Echo  Normal LVFX, normal AoV  4/13/2019  Echo  Mitral stenosis, PA 87 mmHg, AUC indication 70, AUC score 7   4/15/2019  Cath  Moderate diffuse CAD, 3+ AI, severe MR, PAP 75/25, mean 45      Indications for Cardiac Catheterization -  4/13/2019  Echo  Mitral stenosis, PA 87 mmHg, AUC indication 70, AUC score 7, Diagnostic Catheterization Appropriate Use Criteria Description, [facility] 2012;59:[id number]      Conscious Sedation Protocol Used During this Procedure -          Anesthesia: Moderate   Sedation: 1 mg Midazolam (Versed)  25 mcg Fentanyl   Start time: 09:07   Stop time: 09:25   ASA Class: III              After obtaining informed written consent, the patient was brought to the catheterization laboratory where the right groin was prepared in the usual sterile fashion. The patient was monitored continuously with ECG, pulse oximetry, blood pressure monitoring and direct observation. Additionally, please review the \"Wilma Hemodynamic Procedure Report\", which is generated electronically through the [employer]. This report includes additional details regarding this procedure including, but not limited to:     1. Patient Data,   2. Admission,   3. Procedure,   4. Hemodynamics,   5. Vital Signs,   6.  Medications, including conscious sedation medications given during the procedure (as noted above),   7. Procedure Log,   8. Device Usage,   9. Signature Audit [place], and,   10. Signatures. It should be noted, that I sign the \"Signatures\" line electronically through the \"HPF Def Portals\" tab on my computer. Utilizing ultrasound guidance, 2% lidocaine was injected above the common femoral artery. Utilizing a needle guide and the Seldinger technique, the common femoral artery was cannulated and bright red pulsatile blood returned. Using fluoroscopy guidance, the J-tip wire was placed in the common femoral artery. A 6 Fr sheath was placed in the right femoral artery. In similar fashion, an 8 Fr sheath was placed in the right femoral vein. Attention was initially directed to the right heart. Pressures were obtained from each right sided cardiac chamber and saturations were then obtained from pulmonary artery and the aorta. Utilizing 6-Albanian Esau catheters, left ventriculography was performed. After a careful pullback, an ascending aortogram was obtained in the left anterior oblique projection. Selective coronary arteriography was then performed. At this stage, the equipment was removed. A right femoral arteriogram was performed so that  event that a vascular access closure device was to be deployed. A 6-Albanian Mynx vascular access closure device was inserted on the arterial side. Pj Davin pressure was held on the venous side. Hemostasis was achieved at both sites. A sterile dressing was then applied. He was taken to his room in stable and satisfactory condition.   The results of the procedure were explained to the family in the cardiac catheterization laboratories consult / waiting room      Complications:  none      Findings:    Hemodynamics:    Right Heart Pressures:      Site Pressure mmHg        Right atrium 12 mmHg   Right ventricle 79/5/10 mmHg   Pulmonary artery 75/25 mmHg   Mean pulmonary artery 45 mmHg   Mean pulmonary capillary wedge pressure 13 mmHg         Left Heart Pressures:      Site Pressure mmHg        Left ventricular pressure 89/7 mmHg   Left ventricular end-diastolic pressure (LVEDP) 18 mmHg   Aortic pressure 78/40 mmHg   Mean aortic pressure 56 mmHg         Cardiac Performance      Danya cardiac output 3.4 l / min   Danya cardiac index 1.7 l / min / m2   Pulmonary artery pulsatility index (Favian) -    Cardiac power output () - W     Note:      Pulmonary Artery Pulsatility Index (Favian) = (sPAP - dPAP) / RA     < 1 indicative of right heart failure   1 - 1.5  Right heart dysfunction      Cardiac Power Output () - (MAP x CO) / 451     < 0.6 severe left ventricular dysfunctioni       Saturations      Pulmonary artery saturation 55 %   Aorta saturation 97 %       Valve Areas      Aortic valve area 1.8 cm2   Mitral valve area 5.3 cm2           Angiography:    Selective Coronary Arteriography:    Left Main Coronary Artery:  A large vessel which arises from the left sinus of Valsalva. It divides into the left anterior descending coronary artery and the left circumflex. There is mild diffuse disease throughout the entire length of the vessel. Left Anterior Coronary Artery:  The LAD is a moderate sized vessel with several diagonal branches. There is moderate diffuse disease, between 50 and 70%, throughout the entire length of the vessel. Left Circumflex Coronary Artery:  The LCx is a moderate sized vessel with several marginal branches. There is moderate diffuse disease, between 50 and 70%, throughout the entire length of the vessel. Right Coronary Artery:  The RCA is a moderate sized dominant vessel which arises from the right sinus of Valsalva. There is moderate diffuse disease, between 50 and 70%, throughout the entire length of the vessel. Left Ventriculogram:  The left ventriculogram is obtained in the right oblique projection. All regional wall segments move appropriately. The estimated visual ejection fraction is > 50%.   There is severe mitral

## 2019-04-17 NOTE — DISCHARGE SUMMARY
[id number] [facility]    Consultant's - Discharge Summary          I personally saw the patient and rounded with:  Domenica Salmon, on  4/17/19      The observations documented in this note, including the assessment and plan are mine              Patient ID: Mecca Putnam      Patient's PCP: ALEXANDRA Perkins - CNP    Admit Date: 4/12/2019     Discharge Date:  04/17/19     Admitting Physician:  Hospitalist Service       Discharge Physician: Luis Enrique Reinoso MD     Discharge Diagnoses:    1. Sinoatrial node dysfunction    Chronic atrial fibrillation    2. Systemic arterial hypertension  3. Hypercholesteremia  4. Diabetes mellitus  5. Valvular heart disease    12/11/2014  Cath  Normal LVFX, 30 mmHg gradient across the AoV, severe RCA and LM disease  12/16/2014  Cath IVUS to the left main, 35% reduction in the intraluminal diameter  12/18/2014  Cath sent to proximal RCA, PTCA to distal RCA  01/29/2015  23 mm mechanical valve, mitral valve annuloplasty repair w/30 mm Physio and tricuspid ring annuloplasty repair w/34 mm MC3 ring  12/11/2017  Echo  Normal LVFX, normal AoV  4/13/2019  Echo  Mitral stenosis, PA 87 mmHg, AUC indication 70, AUC score 7   4/15/2019  Cath  Moderate diffuse CAD, 3+ AI, severe MR, PAP 75/25, mean 45    6. Peripheral vascular disease    12/1/2017  Right BKA      Cardiac Specific Diagnoses:    Specialty Problems        Cardiology Problems    Essential hypertension                The patient was seen and examined on day of discharge and this discharge summary is in conjunction with any daily progress note from day of discharge. History of Present Illness:     Mecca Putnam is a 77 y.o. male who presents to [facility] CCU # [phone] with symptoms / signs / problem or diagnosis of progressive shortness of air. He also has bradycardia. He was confused. Known AF.   The shortness of air has not been associated with symptoms of chest discomfort, pain with breathing, dizzness, fainting, cough, wheezing, hemoptysis, neck pain, chest injury, fever or sputum production. When being examined this evening, he has had no symptoms of exertional chest discomfort, unusual or change in shortness of air, presyncope or syncope. Hospital Course:     After admission, the patient's heart rate stabilized. Further intervention was not required. The patient underwent cardiac catheterization according to the following criteria:  4/13/2019  Echo  Mitral stenosis, PA 87 mmHg, AUC indication 70, AUC score 7, Diagnostic Catheterization Appropriate Use Criteria Description, [facility] 2012;59:9687-4635    Selective right and left heart and coronary arteriography was preformed, which revealed:    1. Successful femoral artery ultrasound  2. Successful femoral artery arteriogram  3. Supervision of the administration of moderate conscious sedation  4. Moderate coronary artery disease  5. Left ventricular function is normal  6. Moderate to severe aortic insufficiency  7. Dramatically elevated right heart pressure  8. Severe mitral regurgitation. There were no apparent complications. Medical management was intensified. I contacted Dr. Teresa Alvarez, structural heart specialist, at [facility]. The prior cardiovascular surgical data from the AVR on 1/29/2015 was reviewed. The echocardiogram and cardiac catheterization images were sent to him at [facility]. Arrangements were made for the patient to be seen at the Structural Heart Disease clinic at [facility] this upcoming week. Consideration will be made for a NANETTE to evaluate for a kaela - valvular leak of the mechanical valve in the aortic position and also to evaluate the mitral valve for methods to decrease the mitral regurgitation if necessary. The rest of the patient's hospitalization was uneventful. He was discharged home on medical therapy with outpatient follow up.       Consults:     IP CONSULT TO CARDIOLOGY  IP CONSULT TO PHARMACY  PALLIATIVE CARE EVAL      Significant Diagnostic Studies:    1. Selective right and left heart and coronary arteriography with ascending aortography (femoral approach), 4/15/2019       Significant Therapeutic Endeavors:      1. Intensification of medical management, 4/15/2019       Activity: activity as directed per discharge instructions. Diet:  Cardiac diet    Labs: For convenience and continuity at follow-up the following most recent labs are provided:    CBC:    Lab Results   Component Value Date    WBC 5.2 04/17/2019    HGB 9.9 04/17/2019    HCT 34.1 04/17/2019     04/17/2019       Renal:    Lab Results   Component Value Date     04/17/2019    K 4.9 04/17/2019    K 4.4 04/13/2019    CL 89 04/17/2019    CO2 40 04/17/2019    BUN 41 04/17/2019    CREATININE 1.5 04/17/2019    CALCIUM 9.1 04/17/2019         Discharge Medications:     Current Discharge Medication List           Details   HYDROcodone-acetaminophen (NORCO) 5-325 MG per tablet Take 1 tablet by mouth every 6 hours as needed (severe pain) for up to 3 days. Qty: 15 tablet, Refills: 0    Comments: Reduce doses taken as pain becomes manageable  Associated Diagnoses: S/P AVR      nitroGLYCERIN (NITROSTAT) 0.4 MG SL tablet up to max of 3 total doses. If no relief after 1 dose, call 911.   Qty: 25 tablet, Refills: 3      albuterol (PROVENTIL) (2.5 MG/3ML) 0.083% nebulizer solution Take 3 mLs by nebulization every 4 hours as needed for Wheezing  Qty: 120 each, Refills: 3      ipratropium (ATROVENT) 0.02 % nebulizer solution Take 2.5 mLs by nebulization 4 times daily  Qty: 2.5 mL, Refills: 3      lisinopril (PRINIVIL;ZESTRIL) 2.5 MG tablet Take 1 tablet by mouth 2 times daily  Qty: 30 tablet, Refills: 3      ciprofloxacin (CIPRO) 500 MG tablet Take 1 tablet by mouth every 12 hours for 10 days  Qty: 20 tablet, Refills: 0      docusate sodium (COLACE, DULCOLAX) 100 MG CAPS Take 100 mg by mouth 2 times daily  Qty: 30 capsule, Refills: 1 polyethylene glycol (GLYCOLAX) packet Take 17 g by mouth daily as needed for Constipation  Qty: 527 g, Refills: 1              Details   lidocaine (XYLOCAINE) 5 % ointment Apply topically 4 times daily as needed for Pain Apply topically as needed.       melatonin 3 MG TABS tablet Take 9 mg by mouth nightly as needed      ranitidine (ZANTAC) 150 MG tablet Take 150 mg by mouth daily      ipratropium (ATROVENT) 0.03 % nasal spray 2 sprays by Nasal route daily      metoprolol tartrate (LOPRESSOR) 25 MG tablet Take 25 mg by mouth 2 times daily      levothyroxine (SYNTHROID) 50 MCG tablet Take 50 mcg by mouth Daily      ferrous sulfate 325 (65 Fe) MG tablet Take 325 mg by mouth daily (with breakfast)      folic acid (FOLVITE) 1 MG tablet Take 1 mg by mouth daily      acetaminophen (TYLENOL) 500 MG tablet Take 500 mg by mouth 2 times daily as needed At 6 am and 6 pm       tiotropium (SPIRIVA) 18 MCG inhalation capsule Inhale 1 capsule into the lungs daily  Qty: 30 capsule, Refills: 3      warfarin (COUMADIN) 7.5 MG tablet Take one each pm  Qty: 30 tablet, Refills: 0      gabapentin (NEURONTIN) 300 MG capsule Take 2 capsules by mouth every evening  Qty: 90 capsule, Refills: 3      bumetanide (BUMEX) 2 MG tablet Take 1 tablet by mouth daily  Qty: 30 tablet, Refills: 3      spironolactone (ALDACTONE) 50 MG tablet Take 1 tablet by mouth daily  Qty: 30 tablet, Refills: 3      OXYGEN Inhale 2 L into the lungs continuous      allopurinol (ZYLOPRIM) 100 MG tablet Take 100 mg by mouth daily       aspirin 81 MG tablet Take 81 mg by mouth daily On hold      atorvastatin (LIPITOR) 40 MG tablet Take 40 mg by mouth nightly      FLUoxetine (PROZAC) 20 MG capsule Take 20 mg by mouth nightly      insulin glargine (LANTUS) 100 UNIT/ML injection vial Inject 15 Units into the skin 2 times daily       latanoprost (XALATAN) 0.005 % ophthalmic solution Place 1 drop into both eyes nightly      tamsulosin (FLOMAX) 0.4 MG capsule Take 0.4 mg by mouth daily      traZODone (DESYREL) 100 MG tablet Take 50 mg by mouth nightly       Cholecalciferol (VITAMIN D) 2000 units CAPS capsule Take by mouth daily               Condition At Discharge:  Improved    Disposition:      1. Home  2. Follow up with cardiology as arranged  3. Follow up with primary care provider as arranged      Regine Richardson with me to discharge after the bedrest is complete, hemostatis is achieved, the patient is hemodynamically stable and comfortable. Please remind the patient that driving is absolutely prohibited for the next day (24 hours) after this procedure. Additionally, caution should be exercised to avoid heights, swimming, tub baths, open flames, or heavy machinery.         Electronically signed by Naomi Mason MD on 4/17/19

## 2019-04-17 NOTE — PROGRESS NOTES
[facility]      Patient:    Jl Rodriguez  YOB: 1950  Date of Service:  4/17/2019  MRN:    [id number]    Room:   [address] [place] [address]   PCP:    ALEXANDRA Alston CNP     Chief Complaint:  AMS    Subjective:  Sleeping upon my arrival but easily arousable. Complains of constipation, willing to take medication, does not want lactulose. Denies chest pain or discomfort. No other new complaints. Review of Systems:   10 point review of systems is negative except as specifically addressed above. Objective:   VITALS:  /69   Pulse 89   Temp 97 °F (36.1 °C) (Temporal)   Resp 16   Ht 5' 11\" (1.803 m)   Wt 168 lb (76.2 kg)   SpO2 94%   BMI 23.43 kg/m²   24HR INTAKE/OUTPUT:      Intake/Output Summary (Last 24 hours) at 4/17/2019 1417  Last data filed at 4/17/2019 0954  Gross per 24 hour   Intake 740 ml   Output 2000 ml   Net -1260 ml     General appearance: alert, appears stated age, cooperative and no distress  Head: Normocephalic, without obvious abnormality, atraumatic  Eyes: conjunctivae/corneas clear. PERRL, EOM's intact. Ears: normal external ears and nose, throat without exudate  Neck: Supple, no JVD, no tracheal deviation  Lungs: Normal respiratory effort, clear to auscultation bilaterally, no wheezes, rales, or rhonchi. Heart: Irregularly irregular, + murmur noted. Abdomen: Soft, nontender, nondistended, positive bowel sounds, no rebound or guarding. Extremities: Right BKA, no edema, no LLE edema. Skin: Warm and dry, not diaphoretic. Neurologic: Generalized weakness, no focal deficits. Psychiatric: AAO x3, appropriate mood and affect.       Medications:      dextrose        warfarin  5 mg Oral Once    insulin glargine  15 Units Subcutaneous Nightly    lisinopril  2.5 mg Oral BID    ciprofloxacin  500 mg Oral 2 times per day    gabapentin  400 mg Oral TID    sodium chloride flush  10 mL Intravenous 2 times per day    insulin lispro  5 Units Subcutaneous TID WC    aspirin  81 mg Oral Daily    allopurinol  100 mg Oral BID    atorvastatin  40 mg Oral Nightly    bumetanide  2 mg Oral Daily    FLUoxetine  20 mg Oral Nightly    fluticasone  2 spray Nasal BID    lactulose  30 mL Oral BID    metolazone  2.5 mg Oral Daily    spironolactone  50 mg Oral Daily    tamsulosin  0.4 mg Oral Daily    ipratropium  0.5 mg Nebulization 4x daily    warfarin (COUMADIN) daily dosing (placeholder)   Other RX Placeholder    famotidine  20 mg Oral Daily    levothyroxine  50 mcg Oral Daily    folic acid  1 mg Oral Daily    ferrous sulfate  325 mg Oral Daily with breakfast    insulin lispro  0-6 Units Subcutaneous TID WC    insulin lispro  0-3 Units Subcutaneous Nightly    latanoprost  1 drop Both Eyes Daily     HYDROcodone 5 mg - acetaminophen, sodium chloride flush, acetaminophen, ondansetron, polyethylene glycol, albuterol, nitroGLYCERIN, guaiFENesin, melatonin, lidocaine, glucose, dextrose, glucagon (rDNA), dextrose  DIET CARDIAC; Carb Control: 4 carb choices (60 gms)/meal; No Added Salt (3-4 GM);  No Caffeine     Lab and other Data:     Recent Labs     04/15/19  0153 04/15/19  2109 04/16/19  0309 04/17/19  0145   WBC 4.1*  --  4.2* 5.2   HGB 9.7* 12.4 9.2* 9.9*     --  134 156     Recent Labs     04/15/19  0153 04/15/19  2109 04/16/19  0309 04/17/19  0145   NA  --   --  138 137   K 4.5  --  4.6 4.9   CL  --   --  88* 89*   CO2  --  46* 39* 40*   BUN  --   --  44* 41*   CREATININE  --  1.4* 1.3* 1.5*   GLUCOSE  --   --  249* 163*       INR:   Recent Labs     04/15/19  0153 04/16/19  0309 04/17/19  0145   INR 2.33* 2.25* 2.56*     ABGs:   Lab Results   Component Value Date    PHART 7.420 04/15/2019    PO2ART 96 04/15/2019    YKU2SBU 71 04/15/2019       Echo:      Summary   Prosthetic valve in the aortic position.   Trace aortic regurgitation.   Severely dilated right ventricular size with mildly reduced RV function.   Right ventricular systolic pressure is noted at 87 mmHg.      Signature      ----------------------------------------------------------------   Electronically signed by Macho Dominguez MD(Interpreting   [email or web address]) on 04/13/2019 09:43 AM    Problem List:     Patient Active Problem List    Diagnosis Date Noted    S/P AVR      Priority: High    Palliative care patient 04/16/2019    M[facility] kidney [facility]) [facility] 04/13/2019    Acute respiratory failure with hypoxia and hypercapnia ([facility] 60/22/2228    Metabolic encephalopathy 61/69/8801    Symptomatic bradycardia 04/12/2019 12/11/2014  Cath  Normal LVFX, 30 mmHg gradient across the AoV, severe RCA and LM disease  12/16/2014  Cath IVUS to the left main, 35% reduction in the intraluminal diameter  12/18/2014  Cath sent to proximal RCA, PTCA to distal RCA  01/29/2015  23 mm mechanical valve, mitral valve annuloplasty repair w/30 mm Physio and tricuspid ring annuloplasty repair w/34 mm MC3 ring  12/11/2017  Echo  Normal LVFX, normal AoV  4/13/2019  Echo  Mitral stenosis, PA 87 mmHg, AUC indication 70, AUC score 7   4/15/2019  Cath  Moderate diffuse CAD, 3+ AI, severe MR, PAP 75/25, mean 45      Exposure to Agent Orange     BKA stump complication ([facility] 87/10/7348    Cirrhosis of liver with ascites ([facility]     S/P BKA (below knee amputation), right ([address] 12/06/2017    Acute blood loss as cause of postoperative anemia     Type 2 diabetes mellitus with skin complication ([address] 27/94/2391    Acquired hypothyroidism     Essential hypertension     Mixed hyperlipidemia     Glaucoma     Panlobular emphysema ([address]     Stage 3 chronic kidney disease ([address]     Cirrhosis of liver ([address]      Ascites/ followed by [address] History of heart valve replacement with mechanical valve      A fib         Assessment and Plan:     Principal Problem:    Acute respiratory failure with hypoxia and hypercapnia (HCC)  Active Problems:    S/P AVR    Type 2 diabetes mellitus with skin complication ([address]    Stage 3 chronic kidney disease ([facility]    Cirrhosis of liver (HCC)    Exposure to Agent Orange    MARYLIN (acute kidney injury) [facility])    Metabolic encephalopathy    Palliative care patient  Resolved Problems:    * No resolved hospital problems. *    Syncopal episode per wife - Cardiac catheterization 4/15/19. Continue to monitor on telemetry. Metabolic Encephalopathy with hypercapnia/hypoxia - Mental status improved with NIV. He is on continuous O2 at home. Continue supportive care, CPAP QHS as tolerated, continuous O2 as at home. Chronic hypoxia - on home oxygen. Hx of liver cirrhosis, ammonia not elevated, less likely hepatic encephalopathy - Continue maintenance lactulose and aldactone. Proteus Urine culture - Cipro started 4/14/19. Atrial fibrillation - rate controlled. Continue telemetry. Continue coumadin for anticoagulation. Pharmacy dosing. DM2 with hyperglycemia - Continue lantus, SSI, and accuchecks. Decrease lantus to 15 units nightly. MARYLIN on CKD stage 3 at baseline - Creatinine up at 1.5 today. Continue to monitor renal parameters. Hx of AVR - on chronic coumadin. Pharmacy dosing. [facility] in [facility] - patient had previous INR goal of 2.5-3.5. This was decreased to INR goal of 2-3 due to repeated falls and high risk bleeding. S/P cardiac catheterization 4/15/19. Considering transfer to [facility] per Dr. Gamaliel Calderon. Continue to follow. Add twice daily colace, prn milk of magnesia.     Advance Directive:  Full Code   Antibiotic:  Cipro   DVT Prophylaxis:  Coumadin  GI prophylaxis:  Gustavo Sterling PA-C  4/17/2019

## 2019-04-17 NOTE — PROGRESS NOTES
Patient has only been receiving 15 units of the 42 units of Lantus ordered at night  due to patient refusing the full 42 . Patient states wife is in control of what he takes and wife states blood sugar will drop too low if full 42 units are given.

## 2019-04-17 NOTE — CARE COORDINATION
Spoke with Jason Fernandez and received fax number to send documentation and order for the DME (CPAP) to be acquired through DR [facility].  HARPREET faxed to [address]: [phone]  F: [phone]

## 2019-04-17 NOTE — PROGRESS NOTES
Clinical Pharmacy Note    Warfarin consult follow-up    Recent Labs     04/17/19  0145   INR 2.56*     Recent Labs     04/15/19  0153 04/15/19  2109 04/16/19  0309 04/17/19  0145   HGB 9.7* 12.4 9.2* 9.9*   HCT 34.8*  --  32.4* 34.1*     --  134 156       Significant Drug-Drug Interactions:  New warfarin drug-drug interactions: none  Discontinued drug-drug interactions: none    Date INR Warfarin Dose   04/12/19 2.86 5 mg   04/13/19   5 mg   04/14/19 2.45 5 mg   04/15/19 2.33 7.5 mg   04/16/19  2.25 7.5 mg     04/17/19 2.56  5 mg               Notes: Will give warfarin 5 mg x 1 tonight. Daily PT/INR until stable within therapeutic range.      Electronically signed by BRITTANI Callahan [facility] on 4/17/2019 at 5:58 AM

## 2019-04-17 NOTE — PROGRESS NOTES
Visited with pt and pt's wife to provide spiritual care. Pt was receiving a breathing treatment. Pt's wife was reading a book but visited with me and shared that pt was being discharged and would be going to [facility] for possible further treatment. Provided spiritual care with sustaining presence,and  nurtured hope. Pt's wife expressed gratitude for spiritual care.     Electronically signed by Deedee Matos on 4/17/2019 at 3:45 PM

## 2019-05-31 ENCOUNTER — OUTSIDE FACILITY SERVICE (OUTPATIENT)
Dept: CARDIOLOGY | Facility: CLINIC | Age: 69
End: 2019-05-31

## 2019-05-31 PROCEDURE — 93010 ELECTROCARDIOGRAM REPORT: CPT | Performed by: INTERNAL MEDICINE

## 2019-06-06 ENCOUNTER — OFFICE VISIT (OUTPATIENT)
Dept: CARDIOLOGY | Age: 69
End: 2019-06-06
Payer: MEDICARE

## 2019-06-06 VITALS
WEIGHT: 162 LBS | SYSTOLIC BLOOD PRESSURE: 112 MMHG | HEART RATE: 76 BPM | BODY MASS INDEX: 22.68 KG/M2 | HEIGHT: 71 IN | DIASTOLIC BLOOD PRESSURE: 52 MMHG

## 2019-06-06 DIAGNOSIS — Z95.2 HISTORY OF HEART VALVE REPLACEMENT WITH MECHANICAL VALVE: Primary | ICD-10-CM

## 2019-06-06 DIAGNOSIS — I25.10 CORONARY ARTERY DISEASE INVOLVING NATIVE CORONARY ARTERY OF NATIVE HEART WITHOUT ANGINA PECTORIS: ICD-10-CM

## 2019-06-06 DIAGNOSIS — I27.20 PULMONARY HYPERTENSION (HCC): ICD-10-CM

## 2019-06-06 DIAGNOSIS — I48.20 CHRONIC ATRIAL FIBRILLATION (HCC): ICD-10-CM

## 2019-06-06 DIAGNOSIS — I50.32 CHRONIC DIASTOLIC HEART FAILURE (HCC): ICD-10-CM

## 2019-06-06 PROCEDURE — 1036F TOBACCO NON-USER: CPT | Performed by: CLINICAL NURSE SPECIALIST

## 2019-06-06 PROCEDURE — 4040F PNEUMOC VAC/ADMIN/RCVD: CPT | Performed by: CLINICAL NURSE SPECIALIST

## 2019-06-06 PROCEDURE — G8420 CALC BMI NORM PARAMETERS: HCPCS | Performed by: CLINICAL NURSE SPECIALIST

## 2019-06-06 PROCEDURE — 99214 OFFICE O/P EST MOD 30 MIN: CPT | Performed by: CLINICAL NURSE SPECIALIST

## 2019-06-06 PROCEDURE — G8598 ASA/ANTIPLAT THER USED: HCPCS | Performed by: CLINICAL NURSE SPECIALIST

## 2019-06-06 PROCEDURE — 1123F ACP DISCUSS/DSCN MKR DOCD: CPT | Performed by: CLINICAL NURSE SPECIALIST

## 2019-06-06 PROCEDURE — 3017F COLORECTAL CA SCREEN DOC REV: CPT | Performed by: CLINICAL NURSE SPECIALIST

## 2019-06-06 PROCEDURE — G8427 DOCREV CUR MEDS BY ELIG CLIN: HCPCS | Performed by: CLINICAL NURSE SPECIALIST

## 2019-06-06 RX ORDER — GABAPENTIN 300 MG/1
300 CAPSULE ORAL 3 TIMES DAILY
COMMUNITY

## 2019-06-06 RX ORDER — BUMETANIDE 1 MG/1
1 TABLET ORAL 3 TIMES DAILY
COMMUNITY

## 2019-06-06 NOTE — PATIENT INSTRUCTIONS
[facility]   Follow up in 3 mos With Dr. Rahman Ice  Call with any questions or concerns  Follow up with ALEXANDRA Garcia CNP for non cardiac problems  Report any new problems  Cardiovascular Fitness-Exercise as tolerated. Cardiac / Healthy Diet  Continue current medications as directed  Continue plan of treatment  It is always recommended that you bring your medications bottles with you to each visit - this is for your safety!

## 2019-06-06 NOTE — PROGRESS NOTES
[facility] Cardiology  [facility]  [address]  Phone: [phone]  Fax: [phone]    OFFICE VISIT:  2019    Jose Bailey - : 1950    Reason For Visit:  Em Abbasi is a 77 y.o. male who is here for Follow-up (7 week FU cath    patient has soa); Cardiac Valve Problem; Coronary Artery Disease; and Hypertension (Has pulmonary hypertension)  [facility]. Previous AVR 2015. Underwent heart catheterization that showed moderate coronary artery disease. Normal LV systolic function. Moderate to severe AI with elevated right heart pressures. Severe MR. Referred to Dr. Nara Hatch at [facility] . \"Mr. Hannon presents a complicated challenge. He has chronic heart failure as is seen by his elevated NT BNP. We have reviewed his valves in detail. Based on my physical exam, transthoracic and transesophageal echocardiograms, I do not think he has significant valvular disease. He has coronary disease and may benefit from a stress test to evaluate for ischemia in the LAD territory. His main symptom is shortness of breath, and his pulmonary hypertension is most likely playing a role. Based on his outside right heart catheterization, he has pre-capillary pulmonary hypertension. I do think he probably has post-capillary hypertension is well in the setting of HFpEF and him multi-valvular disease in the past. I will refer him to our pulmonary hypertension clinic, Dr. Darlene Epley for his opinion regarding any further possible intervention. His heart failure will continue to be managed by Dr. Jeannine Adam locally. All questions of Mr. Maggie Burgos and his wife were answered in detail. \"      Additionally, patient is seen by [facility] for cirrhosis  He is headed to [facility] today for further testing regarding his cirrhosis. He has appointment with the pulmonary hypertension clinic   He returns today for follow-up.  He states his shortness of breath is about the same. Has not improved. Wears oxygen all the time, 2-3 L per nasal cannula  No peripheral edema but has abdominal bloating. Does not weigh daily due to difficulty with being amputee      Subjective  Mylinjusto Christianson denies exertional chest pain. Sleeps with head of bed elevated. No  paroxysmal nocturnal dyspnea, syncope, presyncope, arrhythmia, edema. The patient denies numbness or weakness to suggest cerebrovascular accident or transient ischemic attack. ALEXANDRA Alston CNP is PCP   Follows with the [place]  Seeing GI at [facility].   Jl Rodriguez has the following history as recorded in [facility]:    Patient Active Problem List    Diagnosis Date Noted    S/P AVR      Priority: High    Palliative care patient 04/16/2019    MARYLIN (acute kidney injury) ([address] 04/13/2019    Acute respiratory failure with hypoxia and hypercapnia (HCC) 39/75/7356    Metabolic encephalopathy 95/52/1043    Symptomatic bradycardia 04/12/2019    Exposure to Agent Orange     BKA stump complication ([address] 89/63/5296    Cirrhosis of liver with ascites ([address]     S/P BKA (below knee amputation), right ([address] 12/06/2017    Acute blood loss as cause of postoperative anemia     Type 2 diabetes mellitus with skin complication ([address] 92/35/1719    Acquired hypothyroidism     Essential hypertension     Mixed hyperlipidemia     Glaucoma     Panlobular emphysema (HCC)     Stage 3 chronic kidney disease (HCC)     Cirrhosis of liver ([address]     History of heart valve replacement with mechanical valve      Past Medical History:   Diagnosis Date    Acute kidney failure (HCC)     Benign prostatic hyperplasia without urinary obstruction     CAD (coronary artery disease)     HFpEF NHYA Class 3, Stage C    CHF (congestive heart failure) (HCC)     history of    CHF (congestive heart failure) (HCC)     Chronic kidney disease (CKD)     Cirrhosis of liver ([address]     Ascites/ followed by [facility]    COPD (chronic hours as needed for Wheezing 120 each 3    ipratropium (ATROVENT) 0.02 % nebulizer solution Take 2.5 mLs by nebulization 4 times daily 2.5 mL 3    lisinopril (PRINIVIL;ZESTRIL) 2.5 MG tablet Take 1 tablet by mouth 2 times daily 30 tablet 3    lidocaine (XYLOCAINE) 5 % ointment Apply topically 4 times daily as needed for Pain Apply topically as needed.  melatonin 3 MG TABS tablet Take 9 mg by mouth nightly as needed      ranitidine (ZANTAC) 150 MG tablet Take 150 mg by mouth daily      ipratropium (ATROVENT) 0.03 % nasal spray 2 sprays by Nasal route daily      metoprolol tartrate (LOPRESSOR) 25 MG tablet Take 25 mg by mouth 2 times daily      levothyroxine (SYNTHROID) 50 MCG tablet Take 50 mcg by mouth Daily      ferrous sulfate 325 (65 Fe) MG tablet Take 325 mg by mouth daily (with breakfast)      folic acid (FOLVITE) 1 MG tablet Take 1 mg by mouth daily      acetaminophen (TYLENOL) 500 MG tablet Take 500 mg by mouth 2 times daily as needed At 6 am and 6 pm       tiotropium (SPIRIVA) 18 MCG inhalation capsule Inhale 1 capsule into the lungs daily 30 capsule 3    warfarin (COUMADIN) 7.5 MG tablet Take one each pm (Patient taking differently: 5 mg Take one each pm; 5 mg tablets on Sunday, Wednesday and Friday.   7.5 mg tablets on Monday, Tuesday, Thursday, and Saturday.) 30 tablet 0    spironolactone (ALDACTONE) 50 MG tablet Take 1 tablet by mouth daily 30 tablet 3    OXYGEN Inhale 2 L into the lungs continuous      allopurinol (ZYLOPRIM) 100 MG tablet Take 100 mg by mouth daily       atorvastatin (LIPITOR) 40 MG tablet Take 40 mg by mouth nightly      FLUoxetine (PROZAC) 20 MG capsule Take 20 mg by mouth nightly      insulin glargine (LANTUS) 100 UNIT/ML injection vial Inject 15 Units into the skin 2 times daily       latanoprost (XALATAN) 0.005 % ophthalmic solution Place 1 drop into both eyes nightly      tamsulosin (FLOMAX) 0.4 MG capsule Take 0.4 mg by mouth daily      traZODone (DESYREL) 100 MG tablet Take 100 mg by mouth nightly       Cholecalciferol (VITAMIN D) 2000 units CAPS capsule Take by mouth daily      nitroGLYCERIN (NITROSTAT) 0.4 MG SL tablet up to max of 3 total doses. If no relief after 1 dose, call 911. 25 tablet 3    docusate sodium (COLACE, DULCOLAX) 100 MG CAPS Take 100 mg by mouth 2 times daily 30 capsule 1    aspirin 81 MG tablet Take 81 mg by mouth daily On hold       No current facility-administered medications for this visit. Allergies: Cephalexin; Prazosin; and Valproic acid and related    Review of Systems  Constitutional - no significant activity change, appetite change, or unexpected weight change. No fever, chills or diaphoresis. No fatigue. HEENT - no significant rhinorrhea or epistaxis. No tinnitus or significant hearing loss. Eyes - no sudden vision change or amaurosis. Respiratory - no significant wheezing, stridor, apnea or cough. + dyspnea on exertion+ shortness of breath. wears O2  Cardiovascular - no exertional chest pain, orthopnea or PND. No sensation of arrhythmia or slow heart rate. No claudication or leg edema. Gastrointestinal - no abdominal swelling or pain. No blood in stool. No severe constipation, diarrhea, nausea, or vomiting. Genitourinary - no difficulty urinating, dysuria, frequency, or urgency. No flank pain or hematuria. Musculoskeletal - no back pain,- has BKA- in W/C. Skin - no color change or rash. No pallor. No new surgical incision. Neurologic - no speech difficulty, facial asymmetry or lateralizing weakness. No seizures, presyncope, syncope, or significant dizziness. Hematologic - no easy bruising or excessive bleeding. Psychiatric - no severe anxiety or insomnia. No confusion. All other review of systems are negative. Objective  Vital Signs - BP (!) 112/52   Pulse 76   Ht 5' 11\" (1.803 m)   Wt 162 lb (73.5 kg)   BMI 22.59 kg/m²   General - Colonel Cadena is alert, cooperative, and pleasant.   Well groomed. No acute distress. Body habitus is normal.  HEENT - The head is normocephalic. No circumoral cyanosis. Dentition is normal.   EYES -  No Xanthelasma, no arcus senilis, no conjunctival hemorrhages or discharge. Neck - Supple, without increased jugular venous pressures. No carotid bruits. No mass. Respiratory - Lungs are clear bilaterally. No wheezes or rales. Normal effort without use of accessory muscles. Cardiovascular - Heart has regular rhythm and rate. No murmurs, rubs or gallops. Abdominal -  Soft, nontender, nondistended. Bowel sounds are intact. Extremities - No clubbing, cyanosis, or  edema. Right BKA   Musculoskeletal -  No clubbing . No Osler's nodes. Gait- in W/C. No kyphosis or scoliosis. Skin - multiple tattoos no statis ulcers or dermatitis. Neurological - No focal signs are identified. Oriented to person, place and time. Psychiatric -  Appropriate affect and mood. Assessment:     Diagnosis Orders   1. History of heart valve replacement with mechanical valve     2. Coronary artery disease involving native coronary artery of native heart without angina pectoris     3. Pulmonary hypertension ([address]     4. Chronic diastolic heart failure ([address]     5. Chronic atrial fibrillation (HCC)       Data:  BP Readings from Last 3 Encounters:   06/06/19 (!) 112/52   04/17/19 129/69   10/22/18 135/85    Pulse Readings from Last 3 Encounters:   06/06/19 76   04/17/19 89   10/22/18 72        Wt Readings from Last 3 Encounters:   06/06/19 162 lb (73.5 kg)   04/17/19 168 lb (76.2 kg)   03/21/18 180 lb (81.6 kg)    INRs per [facility] of an therapeutic around 2.7    [facility] from [facility] including recommendation for evaluation for pulmonary hypertension clinic. Has appointment on July 1. Structural heart clinic did not feel valve disease was route of his symptoms. Was discussion of ischemic study. At this point patient is not having any chest discomfort. Cath showed nonocclusive disease. Will not get Anna scan at this time    Appears to be euvolemic and takes medication appropriately. His shortness of breath is multifactorial    Having further workup for his cirrhosis with testing later today. Wife states possibly adding additional medication depending on test results. States taking medications as prescribed  Stable cardiovascular status. No evidence of overt heart failure, angina or dysrhythmia. Plan    [facility]   Follow up in 3 mos With Dr. Julia Leal  Call with any questions or concerns  Follow up with ALEXANDRA Bradford CNP for non cardiac problems  Report any new problems  Cardiovascular Fitness-Exercise as tolerated. Cardiac / Healthy Diet  Continue current medications as directed  Continue plan of treatment  It is always recommended that you bring your medications bottles with you to each visit - this is for your safety! ALEXANDRA [facility]    EMR dragon/transcription disclaimer: Much of this encounter note is electronic transcription/translation of spoken language to printed tach. Electronic translation of spoken language may be erroneous, or at times, nonsensical words or phrases may be inadvertently transcribed.  Although, I have reviewed the note for such errors, some may still exist.

## 2019-09-10 ENCOUNTER — TELEPHONE (OUTPATIENT)
Dept: CARDIOLOGY | Age: 69
End: 2019-09-10

## 2019-09-11 ENCOUNTER — TELEPHONE (OUTPATIENT)
Dept: CARDIOLOGY | Age: 69
End: 2019-09-11

## 2019-09-16 ENCOUNTER — TELEPHONE (OUTPATIENT)
Dept: CARDIOLOGY | Age: 69
End: 2019-09-16

## 2024-05-31 NOTE — PLAN OF CARE
I have written a letter stating she was referred to counseling. Please let me know if mother needs anything else.  Problem: Venous:  Goal: Signs of wound healing will improve  Signs of wound healing will improve  Outcome: Ongoing      Problem: Compression therapy:  Goal: Will be free from complications associated with compression therapy  Will be free from complications associated with compression therapy  Outcome: Ongoing      Problem: Falls - Risk of:  Goal: Will remain free from falls  Will remain free from falls  Outcome: Ongoing

## (undated) DEVICE — SOLUTION IV IRRIG POUR BRL 0.9% SODIUM CHL 2F7124

## (undated) DEVICE — SUTURE VCRL SZ 3-0 L18IN ABSRB UD L26MM SH 1/2 CIR J864D

## (undated) DEVICE — ZIMMER® STERILE DISPOSABLE TOURNIQUET CUFF WITH PLC, DUAL PORT, SINGLE BLADDER, 34 IN. (86 CM)

## (undated) DEVICE — SUTURE PERMAHAND SZ 3-0 L18IN NONABSORBABLE BLK L26MM SH C013D

## (undated) DEVICE — SUTURE VCRL SZ 2-0 L36IN ABSRB UD L36MM CT-1 1/2 CIR J945H

## (undated) DEVICE — OSCILLATOR BLADE, ME-92 COATED, 32 X 64 X 0.8 MM (.031"): Brand: ME-92

## (undated) DEVICE — TOWEL,OR,DSP,ST,BLUE,DLX,4/PK,20PK/CS: Brand: MEDLINE

## (undated) DEVICE — SUTURE ETHLN SZ 4-0 L18IN NONABSORBABLE BLK L19MM PS-2 3/8 1667H

## (undated) DEVICE — DRESSING PETRO W3XL8IN OIL EMUL N ADH GZ KNIT IMPREG CELOS

## (undated) DEVICE — ASTOUND STANDARD SURGICAL GOWN, XXL: Brand: CONVERTORS

## (undated) DEVICE — BANDAGE COBAN 6 IN WND 6INX5YD FOAM

## (undated) DEVICE — BANDAGE COMPR W6XL12FT SGL LAYERED NO CLSR EXSANGUATION

## (undated) DEVICE — CLIP INT SM WIDE RED TI TRNSVRS GRV CHEVRON SHP W/ PRECIS

## (undated) DEVICE — DRESSING COMPR UNNA BOOT 6 YDX4 IN CALAMINE TAN COFLX UBC

## (undated) DEVICE — PACK,UNIVERSAL,NO GOWNS: Brand: MEDLINE

## (undated) DEVICE — BAND FLX MSTR STD STR 6IN

## (undated) DEVICE — SUTURE PERMAHAND SZ 2-0 L30IN NONABSORBABLE BLK SILK W/O A305H

## (undated) DEVICE — SUTURE VCRL SZ 2-0 L18IN ABSRB UD POLYGLACTIN 910 BRAID TIE J111T

## (undated) DEVICE — THREE QUARTER SHEET: Brand: CONVERTORS

## (undated) DEVICE — SUTURE PERMAHAND SZ 0 L30IN NONABSORBABLE BLK SILK BRAID A306H

## (undated) DEVICE — Z INACTIVE USE 2535480 CLIP LIG M BLU TI HRT SHP WIRE HORZ 180 PER BX

## (undated) DEVICE — FAN SPRAY KIT: Brand: PULSAVAC®

## (undated) DEVICE — CURITY NON-ADHERENT STRIPS: Brand: CURITY

## (undated) DEVICE — SUTURE PROL SZ 3-0 L36IN NONABSORBABLE BLU L26MM SH 1/2 CIR 8522H

## (undated) DEVICE — SPONGE LAP W18XL18IN WHT COT 4 PLY FLD STRUNG RADPQ DISP ST

## (undated) DEVICE — CONVERTORS STOCKINETTE: Brand: CONVERTORS

## (undated) DEVICE — MINOR CDS: Brand: MEDLINE INDUSTRIES, INC.

## (undated) DEVICE — SUTURE PERMAHAND SZ 3-0 L30IN NONABSORBABLE BLK SILK BRAID A304H

## (undated) DEVICE — SUTURE VCRL CTRL REL 2-0 CTX 18IN ABSRB BRAID UD J723D

## (undated) DEVICE — SUTURE PROL SZ 4-0 L36IN NONABSORBABLE BLU L26MM SH 1/2 CIR 8521H